# Patient Record
Sex: MALE | Race: BLACK OR AFRICAN AMERICAN | NOT HISPANIC OR LATINO | URBAN - METROPOLITAN AREA
[De-identification: names, ages, dates, MRNs, and addresses within clinical notes are randomized per-mention and may not be internally consistent; named-entity substitution may affect disease eponyms.]

---

## 2024-01-01 ENCOUNTER — HOSPITAL ENCOUNTER (INPATIENT)
Facility: MEDICAL CENTER | Age: 39
LOS: 8 days | DRG: 208 | End: 2024-06-18
Attending: EMERGENCY MEDICINE | Admitting: INTERNAL MEDICINE
Payer: MEDICAID

## 2024-01-01 ENCOUNTER — APPOINTMENT (OUTPATIENT)
Dept: RADIOLOGY | Facility: MEDICAL CENTER | Age: 39
DRG: 208 | End: 2024-01-01
Attending: EMERGENCY MEDICINE
Payer: MEDICAID

## 2024-01-01 ENCOUNTER — APPOINTMENT (OUTPATIENT)
Dept: RADIOLOGY | Facility: MEDICAL CENTER | Age: 39
DRG: 208 | End: 2024-01-01
Payer: MEDICAID

## 2024-01-01 ENCOUNTER — APPOINTMENT (OUTPATIENT)
Dept: RADIOLOGY | Facility: MEDICAL CENTER | Age: 39
DRG: 208 | End: 2024-01-01
Attending: INTERNAL MEDICINE
Payer: MEDICAID

## 2024-01-01 ENCOUNTER — HOSPITAL ENCOUNTER (OUTPATIENT)
Dept: RADIOLOGY | Facility: MEDICAL CENTER | Age: 39
End: 2024-06-11
Attending: INTERNAL MEDICINE
Payer: MEDICAID

## 2024-01-01 ENCOUNTER — HOSPITAL ENCOUNTER (INPATIENT)
Facility: MEDICAL CENTER | Age: 39
LOS: 4 days | DRG: 951 | End: 2024-06-18
Admitting: INTERNAL MEDICINE
Payer: COMMERCIAL

## 2024-01-01 ENCOUNTER — APPOINTMENT (OUTPATIENT)
Dept: CARDIOLOGY | Facility: MEDICAL CENTER | Age: 39
DRG: 208 | End: 2024-01-01
Payer: MEDICAID

## 2024-01-01 ENCOUNTER — APPOINTMENT (OUTPATIENT)
Dept: CARDIOLOGY | Facility: MEDICAL CENTER | Age: 39
DRG: 208 | End: 2024-01-01
Attending: INTERNAL MEDICINE
Payer: MEDICAID

## 2024-01-01 ENCOUNTER — HOSPITAL ENCOUNTER (OUTPATIENT)
Dept: RADIOLOGY | Facility: MEDICAL CENTER | Age: 39
End: 2024-06-12
Attending: INTERNAL MEDICINE
Payer: MEDICAID

## 2024-01-01 VITALS
HEIGHT: 68 IN | OXYGEN SATURATION: 96 % | RESPIRATION RATE: 23 BRPM | DIASTOLIC BLOOD PRESSURE: 95 MMHG | TEMPERATURE: 99 F | SYSTOLIC BLOOD PRESSURE: 158 MMHG | HEART RATE: 105 BPM | WEIGHT: 180.78 LBS | BODY MASS INDEX: 27.4 KG/M2

## 2024-01-01 DIAGNOSIS — E87.20 LACTIC ACIDOSIS: ICD-10-CM

## 2024-01-01 DIAGNOSIS — R57.9 SHOCK (HCC): ICD-10-CM

## 2024-01-01 DIAGNOSIS — I46.9 CARDIOPULMONARY ARREST (HCC): ICD-10-CM

## 2024-01-01 DIAGNOSIS — I46.9 CARDIAC ARREST (HCC): ICD-10-CM

## 2024-01-01 LAB
ABO + RH BLD: NORMAL
ABO GROUP BLD: NORMAL
ABO GROUP BLD: NORMAL
ACANTHOCYTES BLD QL SMEAR: NORMAL
ALBUMIN SERPL BCP-MCNC: 2.1 G/DL (ref 3.2–4.9)
ALBUMIN SERPL BCP-MCNC: 2.3 G/DL (ref 3.2–4.9)
ALBUMIN SERPL BCP-MCNC: 2.5 G/DL (ref 3.2–4.9)
ALBUMIN SERPL BCP-MCNC: 2.6 G/DL (ref 3.2–4.9)
ALBUMIN SERPL BCP-MCNC: 2.7 G/DL (ref 3.2–4.9)
ALBUMIN SERPL BCP-MCNC: 2.9 G/DL (ref 3.2–4.9)
ALBUMIN SERPL BCP-MCNC: 3.1 G/DL (ref 3.2–4.9)
ALBUMIN SERPL BCP-MCNC: 3.1 G/DL (ref 3.2–4.9)
ALBUMIN SERPL BCP-MCNC: 3.2 G/DL (ref 3.2–4.9)
ALBUMIN/GLOB SERPL: 0.7 G/DL
ALBUMIN/GLOB SERPL: 0.8 G/DL
ALBUMIN/GLOB SERPL: 0.9 G/DL
ALBUMIN/GLOB SERPL: 1 G/DL
ALBUMIN/GLOB SERPL: 1.2 G/DL
ALBUMIN/GLOB SERPL: 1.5 G/DL
ALP SERPL-CCNC: 114 U/L (ref 30–99)
ALP SERPL-CCNC: 117 U/L (ref 30–99)
ALP SERPL-CCNC: 119 U/L (ref 30–99)
ALP SERPL-CCNC: 120 U/L (ref 30–99)
ALP SERPL-CCNC: 120 U/L (ref 30–99)
ALP SERPL-CCNC: 197 U/L (ref 30–99)
ALP SERPL-CCNC: 206 U/L (ref 30–99)
ALP SERPL-CCNC: 214 U/L (ref 30–99)
ALP SERPL-CCNC: 221 U/L (ref 30–99)
ALP SERPL-CCNC: 241 U/L (ref 30–99)
ALP SERPL-CCNC: 270 U/L (ref 30–99)
ALP SERPL-CCNC: 287 U/L (ref 30–99)
ALP SERPL-CCNC: 296 U/L (ref 30–99)
ALP SERPL-CCNC: 299 U/L (ref 30–99)
ALP SERPL-CCNC: 319 U/L (ref 30–99)
ALP SERPL-CCNC: 322 U/L (ref 30–99)
ALP SERPL-CCNC: 325 U/L (ref 30–99)
ALP SERPL-CCNC: 352 U/L (ref 30–99)
ALP SERPL-CCNC: 364 U/L (ref 30–99)
ALP SERPL-CCNC: 378 U/L (ref 30–99)
ALP SERPL-CCNC: 75 U/L (ref 30–99)
ALT SERPL-CCNC: 1007 U/L (ref 2–50)
ALT SERPL-CCNC: 1020 U/L (ref 2–50)
ALT SERPL-CCNC: 1050 U/L (ref 2–50)
ALT SERPL-CCNC: 1427 U/L (ref 2–50)
ALT SERPL-CCNC: 2282 U/L (ref 2–50)
ALT SERPL-CCNC: 307 U/L (ref 2–50)
ALT SERPL-CCNC: 332 U/L (ref 2–50)
ALT SERPL-CCNC: 339 U/L (ref 2–50)
ALT SERPL-CCNC: 3400 U/L (ref 2–50)
ALT SERPL-CCNC: 3745 U/L (ref 2–50)
ALT SERPL-CCNC: 387 U/L (ref 2–50)
ALT SERPL-CCNC: 424 U/L (ref 2–50)
ALT SERPL-CCNC: 4343 U/L (ref 2–50)
ALT SERPL-CCNC: 447 U/L (ref 2–50)
ALT SERPL-CCNC: 485 U/L (ref 2–50)
ALT SERPL-CCNC: 576 U/L (ref 2–50)
ALT SERPL-CCNC: 621 U/L (ref 2–50)
ALT SERPL-CCNC: 6503 U/L (ref 2–50)
ALT SERPL-CCNC: 694 U/L (ref 2–50)
ALT SERPL-CCNC: 719 U/L (ref 2–50)
ALT SERPL-CCNC: 816 U/L (ref 2–50)
AMMONIA PLAS-SCNC: 39 UMOL/L (ref 11–45)
AMORPH CRY #/AREA URNS HPF: PRESENT /HPF
AMPHET UR QL SCN: POSITIVE
AMYLASE SERPL-CCNC: 215 U/L (ref 20–103)
ANION GAP SERPL CALC-SCNC: 16 MMOL/L (ref 7–16)
ANION GAP SERPL CALC-SCNC: 17 MMOL/L (ref 7–16)
ANION GAP SERPL CALC-SCNC: 18 MMOL/L (ref 7–16)
ANION GAP SERPL CALC-SCNC: 19 MMOL/L (ref 7–16)
ANION GAP SERPL CALC-SCNC: 20 MMOL/L (ref 7–16)
ANION GAP SERPL CALC-SCNC: 21 MMOL/L (ref 7–16)
ANION GAP SERPL CALC-SCNC: 22 MMOL/L (ref 7–16)
ANION GAP SERPL CALC-SCNC: 23 MMOL/L (ref 7–16)
ANION GAP SERPL CALC-SCNC: 28 MMOL/L (ref 7–16)
ANION GAP SERPL CALC-SCNC: 31 MMOL/L (ref 7–16)
ANISOCYTOSIS BLD QL SMEAR: ABNORMAL
ANISOCYTOSIS BLD QL SMEAR: ABNORMAL
APAP SERPL-MCNC: 18 UG/ML (ref 10–30)
APAP SERPL-MCNC: <5 UG/ML (ref 10–30)
APPEARANCE UR: ABNORMAL
APPEARANCE UR: CLEAR
APPEARANCE UR: CLEAR
APTT PPP: 29.6 SEC (ref 24.7–36)
APTT PPP: 31 SEC (ref 24.7–36)
APTT PPP: 31.1 SEC (ref 24.7–36)
APTT PPP: 32.7 SEC (ref 24.7–36)
APTT PPP: 33 SEC (ref 24.7–36)
APTT PPP: 33.7 SEC (ref 24.7–36)
APTT PPP: 35 SEC (ref 24.7–36)
APTT PPP: 35.1 SEC (ref 24.7–36)
APTT PPP: 35.1 SEC (ref 24.7–36)
APTT PPP: 35.3 SEC (ref 24.7–36)
APTT PPP: 35.3 SEC (ref 24.7–36)
APTT PPP: 35.6 SEC (ref 24.7–36)
APTT PPP: 36.3 SEC (ref 24.7–36)
APTT PPP: 36.8 SEC (ref 24.7–36)
APTT PPP: 39.5 SEC (ref 24.7–36)
APTT PPP: 40 SEC (ref 24.7–36)
ARTERIAL PATENCY WRIST A: ABNORMAL
AST SERPL-CCNC: 109 U/L (ref 12–45)
AST SERPL-CCNC: 126 U/L (ref 12–45)
AST SERPL-CCNC: 131 U/L (ref 12–45)
AST SERPL-CCNC: 132 U/L (ref 12–45)
AST SERPL-CCNC: 138 U/L (ref 12–45)
AST SERPL-CCNC: 146 U/L (ref 12–45)
AST SERPL-CCNC: 159 U/L (ref 12–45)
AST SERPL-CCNC: 170 U/L (ref 12–45)
AST SERPL-CCNC: 191 U/L (ref 12–45)
AST SERPL-CCNC: 202 U/L (ref 12–45)
AST SERPL-CCNC: 220 U/L (ref 12–45)
AST SERPL-CCNC: 2520 U/L (ref 12–45)
AST SERPL-CCNC: 258 U/L (ref 12–45)
AST SERPL-CCNC: 286 U/L (ref 12–45)
AST SERPL-CCNC: 301 U/L (ref 12–45)
AST SERPL-CCNC: 3627 U/L (ref 12–45)
AST SERPL-CCNC: 3716 U/L (ref 12–45)
AST SERPL-CCNC: 445 U/L (ref 12–45)
AST SERPL-CCNC: 900 U/L (ref 12–45)
AST SERPL-CCNC: 99 U/L (ref 12–45)
AST SERPL-CCNC: >7000 U/L (ref 12–45)
BACTERIA #/AREA URNS HPF: ABNORMAL /HPF
BACTERIA #/AREA URNS HPF: NEGATIVE /HPF
BACTERIA #/AREA URNS HPF: NEGATIVE /HPF
BACTERIA BLD CULT: NORMAL
BACTERIA BLD CULT: NORMAL
BACTERIA SPEC RESP CULT: NORMAL
BACTERIA SPEC RESP CULT: NORMAL
BACTERIA UR CULT: NORMAL
BARBITURATES UR QL SCN: NEGATIVE
BARCODED ABORH UBTYP: 6200
BARCODED PRD CODE UBPRD: NORMAL
BARCODED UNIT NUM UBUNT: NORMAL
BASE EXCESS BLDA CALC-SCNC: -1 MMOL/L (ref -4–3)
BASE EXCESS BLDA CALC-SCNC: -11 MMOL/L (ref -4–3)
BASE EXCESS BLDA CALC-SCNC: -14 MMOL/L (ref -4–3)
BASE EXCESS BLDA CALC-SCNC: -15 MMOL/L (ref -4–3)
BASE EXCESS BLDA CALC-SCNC: -16 MMOL/L (ref -4–3)
BASE EXCESS BLDA CALC-SCNC: -7 MMOL/L (ref -4–3)
BASE EXCESS BLDA CALC-SCNC: 10 MMOL/L (ref -4–3)
BASE EXCESS BLDA CALC-SCNC: 11 MMOL/L (ref -4–3)
BASE EXCESS BLDA CALC-SCNC: 2 MMOL/L (ref -4–3)
BASE EXCESS BLDA CALC-SCNC: 5 MMOL/L (ref -4–3)
BASE EXCESS BLDA CALC-SCNC: 5 MMOL/L (ref -4–3)
BASE EXCESS BLDA CALC-SCNC: 7 MMOL/L (ref -4–3)
BASE EXCESS BLDA CALC-SCNC: 7 MMOL/L (ref -4–3)
BASE EXCESS BLDA CALC-SCNC: 8 MMOL/L (ref -4–3)
BASE EXCESS BLDA CALC-SCNC: 9 MMOL/L (ref -4–3)
BASOPHILS # BLD AUTO: 0 % (ref 0–1.8)
BASOPHILS # BLD AUTO: 0 % (ref 0–1.8)
BASOPHILS # BLD AUTO: 0.1 % (ref 0–1.8)
BASOPHILS # BLD AUTO: 0.2 % (ref 0–1.8)
BASOPHILS # BLD: 0 K/UL (ref 0–0.12)
BASOPHILS # BLD: 0 K/UL (ref 0–0.12)
BASOPHILS # BLD: 0.01 K/UL (ref 0–0.12)
BASOPHILS # BLD: 0.02 K/UL (ref 0–0.12)
BASOPHILS # BLD: 0.03 K/UL (ref 0–0.12)
BASOPHILS # BLD: 0.04 K/UL (ref 0–0.12)
BASOPHILS # BLD: 0.05 K/UL (ref 0–0.12)
BENZODIAZ UR QL SCN: NEGATIVE
BILIRUB CONJ SERPL-MCNC: 0.2 MG/DL (ref 0.1–0.5)
BILIRUB CONJ SERPL-MCNC: 0.2 MG/DL (ref 0.1–0.5)
BILIRUB CONJ SERPL-MCNC: 0.3 MG/DL (ref 0.1–0.5)
BILIRUB CONJ SERPL-MCNC: 0.4 MG/DL (ref 0.1–0.5)
BILIRUB CONJ SERPL-MCNC: 0.4 MG/DL (ref 0.1–0.5)
BILIRUB CONJ SERPL-MCNC: 0.5 MG/DL (ref 0.1–0.5)
BILIRUB CONJ SERPL-MCNC: 0.7 MG/DL (ref 0.1–0.5)
BILIRUB CONJ SERPL-MCNC: 0.8 MG/DL (ref 0.1–0.5)
BILIRUB CONJ SERPL-MCNC: 0.9 MG/DL (ref 0.1–0.5)
BILIRUB CONJ SERPL-MCNC: 0.9 MG/DL (ref 0.1–0.5)
BILIRUB INDIRECT SERPL-MCNC: 0.1 MG/DL (ref 0–1)
BILIRUB INDIRECT SERPL-MCNC: 0.1 MG/DL (ref 0–1)
BILIRUB INDIRECT SERPL-MCNC: 0.2 MG/DL (ref 0–1)
BILIRUB INDIRECT SERPL-MCNC: 0.3 MG/DL (ref 0–1)
BILIRUB INDIRECT SERPL-MCNC: 0.4 MG/DL (ref 0–1)
BILIRUB SERPL-MCNC: 0.3 MG/DL (ref 0.1–1.5)
BILIRUB SERPL-MCNC: 0.4 MG/DL (ref 0.1–1.5)
BILIRUB SERPL-MCNC: 0.4 MG/DL (ref 0.1–1.5)
BILIRUB SERPL-MCNC: 0.5 MG/DL (ref 0.1–1.5)
BILIRUB SERPL-MCNC: 0.6 MG/DL (ref 0.1–1.5)
BILIRUB SERPL-MCNC: 0.7 MG/DL (ref 0.1–1.5)
BILIRUB SERPL-MCNC: 0.7 MG/DL (ref 0.1–1.5)
BILIRUB SERPL-MCNC: 0.9 MG/DL (ref 0.1–1.5)
BILIRUB SERPL-MCNC: 1 MG/DL (ref 0.1–1.5)
BILIRUB SERPL-MCNC: 1.2 MG/DL (ref 0.1–1.5)
BILIRUB SERPL-MCNC: 1.3 MG/DL (ref 0.1–1.5)
BILIRUB SERPL-MCNC: 1.4 MG/DL (ref 0.1–1.5)
BILIRUB SERPL-MCNC: <0.2 MG/DL (ref 0.1–1.5)
BILIRUB UR QL STRIP.AUTO: NEGATIVE
BLD GP AB SCN SERPL QL: NORMAL
BLD GP AB SCN SERPL QL: NORMAL
BODY TEMPERATURE: 36.8 CENTIGRADE
BODY TEMPERATURE: 36.9 CENTIGRADE
BODY TEMPERATURE: 37 CENTIGRADE
BODY TEMPERATURE: ABNORMAL DEGREES
BREATHS SETTING VENT: 26
BREATHS SETTING VENT: 30
BREATHS SETTING VENT: 32
BUN SERPL-MCNC: 100 MG/DL (ref 8–22)
BUN SERPL-MCNC: 103 MG/DL (ref 8–22)
BUN SERPL-MCNC: 108 MG/DL (ref 8–22)
BUN SERPL-MCNC: 111 MG/DL (ref 8–22)
BUN SERPL-MCNC: 111 MG/DL (ref 8–22)
BUN SERPL-MCNC: 115 MG/DL (ref 8–22)
BUN SERPL-MCNC: 116 MG/DL (ref 8–22)
BUN SERPL-MCNC: 118 MG/DL (ref 8–22)
BUN SERPL-MCNC: 123 MG/DL (ref 8–22)
BUN SERPL-MCNC: 126 MG/DL (ref 8–22)
BUN SERPL-MCNC: 128 MG/DL (ref 8–22)
BUN SERPL-MCNC: 136 MG/DL (ref 8–22)
BUN SERPL-MCNC: 140 MG/DL (ref 8–22)
BUN SERPL-MCNC: 15 MG/DL (ref 8–22)
BUN SERPL-MCNC: 151 MG/DL (ref 8–22)
BUN SERPL-MCNC: 155 MG/DL (ref 8–22)
BUN SERPL-MCNC: 165 MG/DL (ref 8–22)
BUN SERPL-MCNC: 19 MG/DL (ref 8–22)
BUN SERPL-MCNC: 26 MG/DL (ref 8–22)
BUN SERPL-MCNC: 27 MG/DL (ref 8–22)
BUN SERPL-MCNC: 37 MG/DL (ref 8–22)
BUN SERPL-MCNC: 47 MG/DL (ref 8–22)
BUN SERPL-MCNC: 52 MG/DL (ref 8–22)
BUN SERPL-MCNC: 58 MG/DL (ref 8–22)
BUN SERPL-MCNC: 66 MG/DL (ref 8–22)
BUN SERPL-MCNC: 75 MG/DL (ref 8–22)
BUN SERPL-MCNC: 75 MG/DL (ref 8–22)
BUN SERPL-MCNC: 77 MG/DL (ref 8–22)
BUN SERPL-MCNC: 79 MG/DL (ref 8–22)
BUN SERPL-MCNC: 89 MG/DL (ref 8–22)
BUN SERPL-MCNC: 93 MG/DL (ref 8–22)
BUN SERPL-MCNC: 98 MG/DL (ref 8–22)
BUN SERPL-MCNC: 98 MG/DL (ref 8–22)
BUN SERPL-MCNC: >112 MG/DL (ref 8–22)
BURR CELLS BLD QL SMEAR: NORMAL
BZE UR QL SCN: NEGATIVE
CA-I BLD ISE-SCNC: 0.99 MMOL/L (ref 1.1–1.3)
CA-I BLD ISE-SCNC: 1.02 MMOL/L (ref 1.1–1.3)
CA-I BLD ISE-SCNC: 1.08 MMOL/L (ref 1.1–1.3)
CA-I SERPL-SCNC: 1 MMOL/L (ref 1.1–1.3)
CALCIUM ALBUM COR SERPL-MCNC: 10.1 MG/DL (ref 8.5–10.5)
CALCIUM ALBUM COR SERPL-MCNC: 10.2 MG/DL (ref 8.5–10.5)
CALCIUM ALBUM COR SERPL-MCNC: 10.2 MG/DL (ref 8.5–10.5)
CALCIUM ALBUM COR SERPL-MCNC: 8.1 MG/DL (ref 8.5–10.5)
CALCIUM ALBUM COR SERPL-MCNC: 8.2 MG/DL (ref 8.5–10.5)
CALCIUM ALBUM COR SERPL-MCNC: 8.5 MG/DL (ref 8.5–10.5)
CALCIUM ALBUM COR SERPL-MCNC: 8.5 MG/DL (ref 8.5–10.5)
CALCIUM ALBUM COR SERPL-MCNC: 8.6 MG/DL (ref 8.5–10.5)
CALCIUM ALBUM COR SERPL-MCNC: 8.8 MG/DL (ref 8.5–10.5)
CALCIUM ALBUM COR SERPL-MCNC: 8.8 MG/DL (ref 8.5–10.5)
CALCIUM ALBUM COR SERPL-MCNC: 9 MG/DL (ref 8.5–10.5)
CALCIUM ALBUM COR SERPL-MCNC: 9 MG/DL (ref 8.5–10.5)
CALCIUM ALBUM COR SERPL-MCNC: 9.1 MG/DL (ref 8.5–10.5)
CALCIUM ALBUM COR SERPL-MCNC: 9.1 MG/DL (ref 8.5–10.5)
CALCIUM ALBUM COR SERPL-MCNC: 9.2 MG/DL (ref 8.5–10.5)
CALCIUM ALBUM COR SERPL-MCNC: 9.3 MG/DL (ref 8.5–10.5)
CALCIUM ALBUM COR SERPL-MCNC: 9.5 MG/DL (ref 8.5–10.5)
CALCIUM ALBUM COR SERPL-MCNC: 9.7 MG/DL (ref 8.5–10.5)
CALCIUM ALBUM COR SERPL-MCNC: 9.8 MG/DL (ref 8.5–10.5)
CALCIUM ALBUM COR SERPL-MCNC: 9.8 MG/DL (ref 8.5–10.5)
CALCIUM SERPL-MCNC: 6.7 MG/DL (ref 8.5–10.5)
CALCIUM SERPL-MCNC: 6.8 MG/DL (ref 8.5–10.5)
CALCIUM SERPL-MCNC: 7 MG/DL (ref 8.5–10.5)
CALCIUM SERPL-MCNC: 7.1 MG/DL (ref 8.5–10.5)
CALCIUM SERPL-MCNC: 7.3 MG/DL (ref 8.5–10.5)
CALCIUM SERPL-MCNC: 7.3 MG/DL (ref 8.5–10.5)
CALCIUM SERPL-MCNC: 7.4 MG/DL (ref 8.5–10.5)
CALCIUM SERPL-MCNC: 7.5 MG/DL (ref 8.5–10.5)
CALCIUM SERPL-MCNC: 7.5 MG/DL (ref 8.5–10.5)
CALCIUM SERPL-MCNC: 7.6 MG/DL (ref 8.5–10.5)
CALCIUM SERPL-MCNC: 7.7 MG/DL (ref 8.5–10.5)
CALCIUM SERPL-MCNC: 7.7 MG/DL (ref 8.5–10.5)
CALCIUM SERPL-MCNC: 7.8 MG/DL (ref 8.5–10.5)
CALCIUM SERPL-MCNC: 7.9 MG/DL (ref 8.5–10.5)
CALCIUM SERPL-MCNC: 8 MG/DL (ref 8.5–10.5)
CALCIUM SERPL-MCNC: 8.1 MG/DL (ref 8.5–10.5)
CALCIUM SERPL-MCNC: 8.2 MG/DL (ref 8.5–10.5)
CALCIUM SERPL-MCNC: 8.4 MG/DL (ref 8.5–10.5)
CALCIUM SERPL-MCNC: 8.6 MG/DL (ref 8.5–10.5)
CALCIUM SERPL-MCNC: 8.7 MG/DL (ref 8.5–10.5)
CALCIUM SERPL-MCNC: 8.8 MG/DL (ref 8.5–10.5)
CALCIUM SERPL-MCNC: 8.9 MG/DL (ref 8.5–10.5)
CALCIUM SERPL-MCNC: 8.9 MG/DL (ref 8.5–10.5)
CALCIUM SERPL-MCNC: 9 MG/DL (ref 8.5–10.5)
CALCIUM SERPL-MCNC: 9 MG/DL (ref 8.5–10.5)
CANNABINOIDS UR QL SCN: POSITIVE
CFT BLD TEG: 9.1 MIN (ref 4.6–9.1)
CFT P HPASE BLD TEG: 7.9 MIN (ref 4.3–8.3)
CHLORIDE SERPL-SCNC: 100 MMOL/L (ref 96–112)
CHLORIDE SERPL-SCNC: 101 MMOL/L (ref 96–112)
CHLORIDE SERPL-SCNC: 101 MMOL/L (ref 96–112)
CHLORIDE SERPL-SCNC: 102 MMOL/L (ref 96–112)
CHLORIDE SERPL-SCNC: 102 MMOL/L (ref 96–112)
CHLORIDE SERPL-SCNC: 106 MMOL/L (ref 96–112)
CHLORIDE SERPL-SCNC: 85 MMOL/L (ref 96–112)
CHLORIDE SERPL-SCNC: 86 MMOL/L (ref 96–112)
CHLORIDE SERPL-SCNC: 87 MMOL/L (ref 96–112)
CHLORIDE SERPL-SCNC: 88 MMOL/L (ref 96–112)
CHLORIDE SERPL-SCNC: 90 MMOL/L (ref 96–112)
CHLORIDE SERPL-SCNC: 91 MMOL/L (ref 96–112)
CHLORIDE SERPL-SCNC: 92 MMOL/L (ref 96–112)
CHLORIDE SERPL-SCNC: 92 MMOL/L (ref 96–112)
CHLORIDE SERPL-SCNC: 93 MMOL/L (ref 96–112)
CHLORIDE SERPL-SCNC: 95 MMOL/L (ref 96–112)
CHLORIDE SERPL-SCNC: 96 MMOL/L (ref 96–112)
CHLORIDE SERPL-SCNC: 99 MMOL/L (ref 96–112)
CHLORIDE SERPL-SCNC: 99 MMOL/L (ref 96–112)
CK BB CFR SERPL ELPH: 0 % (ref 0–0)
CK MACRO1 CFR SERPL: 0 % (ref 0–0)
CK MACRO2 CFR SERPL: 0 % (ref 0–0)
CK MB CFR SERPL ELPH: 0 % (ref 0–4)
CK MM CFR SERPL ELPH: 100 % (ref 96–100)
CK SERPL-CCNC: 1248 U/L (ref 0–154)
CK SERPL-CCNC: 1281 U/L (ref 39–308)
CK SERPL-CCNC: 137 U/L (ref 39–308)
CK SERPL-CCNC: 1394 U/L (ref 39–308)
CK SERPL-CCNC: 1622 U/L (ref 39–308)
CK SERPL-CCNC: 1885 U/L (ref 39–308)
CK SERPL-CCNC: 2352 U/L (ref 0–154)
CK SERPL-CCNC: 2373 U/L (ref 39–308)
CK SERPL-CCNC: 4260 U/L (ref 0–154)
CK SERPL-CCNC: ABNORMAL U/L (ref 0–154)
CLOT ANGLE BLD TEG: 79.4 DEGREES (ref 63–78)
CLOT LYSIS 30M P MA LENFR BLD TEG: 0.1 % (ref 0–2.6)
CO2 BLDA-SCNC: 12 MMOL/L (ref 20–33)
CO2 BLDA-SCNC: 14 MMOL/L (ref 20–33)
CO2 BLDA-SCNC: 17 MMOL/L (ref 20–33)
CO2 BLDA-SCNC: 18 MMOL/L (ref 20–33)
CO2 BLDA-SCNC: 25 MMOL/L (ref 20–33)
CO2 BLDA-SCNC: 27 MMOL/L (ref 20–33)
CO2 BLDA-SCNC: 32 MMOL/L (ref 20–33)
CO2 BLDA-SCNC: 33 MMOL/L (ref 20–33)
CO2 BLDA-SCNC: 34 MMOL/L (ref 20–33)
CO2 BLDA-SCNC: 35 MMOL/L (ref 20–33)
CO2 BLDA-SCNC: 36 MMOL/L (ref 20–33)
CO2 BLDA-SCNC: 37 MMOL/L (ref 20–33)
CO2 SERPL-SCNC: 12 MMOL/L (ref 20–33)
CO2 SERPL-SCNC: 14 MMOL/L (ref 20–33)
CO2 SERPL-SCNC: 14 MMOL/L (ref 20–33)
CO2 SERPL-SCNC: 15 MMOL/L (ref 20–33)
CO2 SERPL-SCNC: 15 MMOL/L (ref 20–33)
CO2 SERPL-SCNC: 16 MMOL/L (ref 20–33)
CO2 SERPL-SCNC: 21 MMOL/L (ref 20–33)
CO2 SERPL-SCNC: 22 MMOL/L (ref 20–33)
CO2 SERPL-SCNC: 23 MMOL/L (ref 20–33)
CO2 SERPL-SCNC: 24 MMOL/L (ref 20–33)
CO2 SERPL-SCNC: 26 MMOL/L (ref 20–33)
CO2 SERPL-SCNC: 27 MMOL/L (ref 20–33)
CO2 SERPL-SCNC: 28 MMOL/L (ref 20–33)
CO2 SERPL-SCNC: 29 MMOL/L (ref 20–33)
CO2 SERPL-SCNC: 29 MMOL/L (ref 20–33)
CO2 SERPL-SCNC: 9 MMOL/L (ref 20–33)
COLOR UR: YELLOW
COMMENT 1642: NORMAL
COMMENT NL1176: NORMAL
COMPONENT R 8504R: NORMAL
CREAT SERPL-MCNC: 10.14 MG/DL (ref 0.5–1.4)
CREAT SERPL-MCNC: 10.49 MG/DL (ref 0.5–1.4)
CREAT SERPL-MCNC: 10.53 MG/DL (ref 0.5–1.4)
CREAT SERPL-MCNC: 11.11 MG/DL (ref 0.5–1.4)
CREAT SERPL-MCNC: 11.41 MG/DL (ref 0.5–1.4)
CREAT SERPL-MCNC: 12.01 MG/DL (ref 0.5–1.4)
CREAT SERPL-MCNC: 12.01 MG/DL (ref 0.5–1.4)
CREAT SERPL-MCNC: 12.87 MG/DL (ref 0.5–1.4)
CREAT SERPL-MCNC: 12.97 MG/DL (ref 0.5–1.4)
CREAT SERPL-MCNC: 13.37 MG/DL (ref 0.5–1.4)
CREAT SERPL-MCNC: 13.74 MG/DL (ref 0.5–1.4)
CREAT SERPL-MCNC: 2.29 MG/DL (ref 0.5–1.4)
CREAT SERPL-MCNC: 2.31 MG/DL (ref 0.5–1.4)
CREAT SERPL-MCNC: 2.35 MG/DL (ref 0.5–1.4)
CREAT SERPL-MCNC: 2.39 MG/DL (ref 0.5–1.4)
CREAT SERPL-MCNC: 3.06 MG/DL (ref 0.5–1.4)
CREAT SERPL-MCNC: 3.4 MG/DL (ref 0.5–1.4)
CREAT SERPL-MCNC: 3.97 MG/DL (ref 0.5–1.4)
CREAT SERPL-MCNC: 4.07 MG/DL (ref 0.5–1.4)
CREAT SERPL-MCNC: 4.36 MG/DL (ref 0.5–1.4)
CREAT SERPL-MCNC: 4.4 MG/DL (ref 0.5–1.4)
CREAT SERPL-MCNC: 5.09 MG/DL (ref 0.5–1.4)
CREAT SERPL-MCNC: 5.15 MG/DL (ref 0.5–1.4)
CREAT SERPL-MCNC: 5.57 MG/DL (ref 0.5–1.4)
CREAT SERPL-MCNC: 5.95 MG/DL (ref 0.5–1.4)
CREAT SERPL-MCNC: 6.27 MG/DL (ref 0.5–1.4)
CREAT SERPL-MCNC: 6.87 MG/DL (ref 0.5–1.4)
CREAT SERPL-MCNC: 7.33 MG/DL (ref 0.5–1.4)
CREAT SERPL-MCNC: 7.65 MG/DL (ref 0.5–1.4)
CREAT SERPL-MCNC: 8.2 MG/DL (ref 0.5–1.4)
CREAT SERPL-MCNC: 8.28 MG/DL (ref 0.5–1.4)
CREAT SERPL-MCNC: 8.6 MG/DL (ref 0.5–1.4)
CREAT SERPL-MCNC: 8.64 MG/DL (ref 0.5–1.4)
CREAT SERPL-MCNC: 9.25 MG/DL (ref 0.5–1.4)
CREAT SERPL-MCNC: 9.63 MG/DL (ref 0.5–1.4)
CREAT SERPL-MCNC: 9.84 MG/DL (ref 0.5–1.4)
CRP SERPL HS-MCNC: 0.69 MG/DL (ref 0–0.75)
CRP SERPL HS-MCNC: 11.25 MG/DL (ref 0–0.75)
CRP SERPL HS-MCNC: 3.18 MG/DL (ref 0–0.75)
CT.EXTRINSIC BLD ROTEM: 0.8 MIN (ref 0.8–2.1)
CYTOLOGY REG CYTOL: NORMAL
DELSYS IDSYS: ABNORMAL
EKG IMPRESSION: NORMAL
END TIDAL CARBON DIOXIDE IECO2: 20 MMHG
END TIDAL CARBON DIOXIDE IECO2: 21 MMHG
END TIDAL CARBON DIOXIDE IECO2: 24 MMHG
END TIDAL CARBON DIOXIDE IECO2: 25 MMHG
END TIDAL CARBON DIOXIDE IECO2: 26 MMHG
END TIDAL CARBON DIOXIDE IECO2: 32 MMHG
END TIDAL CARBON DIOXIDE IECO2: 33 MMHG
END TIDAL CARBON DIOXIDE IECO2: 34 MMHG
END TIDAL CARBON DIOXIDE IECO2: 35 MMHG
END TIDAL CARBON DIOXIDE IECO2: 36 MMHG
END TIDAL CARBON DIOXIDE IECO2: 36 MMHG
END TIDAL CARBON DIOXIDE IECO2: 38 MMHG
END TIDAL CARBON DIOXIDE IECO2: 39 MMHG
END TIDAL CARBON DIOXIDE IECO2: 40 MMHG
END TIDAL CARBON DIOXIDE IECO2: 42 MMHG
EOSINOPHIL # BLD AUTO: 0 K/UL (ref 0–0.51)
EOSINOPHIL # BLD AUTO: 0.05 K/UL (ref 0–0.51)
EOSINOPHIL # BLD AUTO: 0.06 K/UL (ref 0–0.51)
EOSINOPHIL # BLD AUTO: 0.08 K/UL (ref 0–0.51)
EOSINOPHIL # BLD AUTO: 0.09 K/UL (ref 0–0.51)
EOSINOPHIL # BLD AUTO: 0.12 K/UL (ref 0–0.51)
EOSINOPHIL # BLD AUTO: 0.13 K/UL (ref 0–0.51)
EOSINOPHIL # BLD AUTO: 0.22 K/UL (ref 0–0.51)
EOSINOPHIL # BLD AUTO: 0.26 K/UL (ref 0–0.51)
EOSINOPHIL # BLD AUTO: 0.27 K/UL (ref 0–0.51)
EOSINOPHIL # BLD AUTO: 0.28 K/UL (ref 0–0.51)
EOSINOPHIL # BLD AUTO: 0.31 K/UL (ref 0–0.51)
EOSINOPHIL # BLD AUTO: 0.33 K/UL (ref 0–0.51)
EOSINOPHIL # BLD AUTO: 0.34 K/UL (ref 0–0.51)
EOSINOPHIL # BLD AUTO: 0.35 K/UL (ref 0–0.51)
EOSINOPHIL # BLD AUTO: 0.37 K/UL (ref 0–0.51)
EOSINOPHIL # BLD AUTO: 0.38 K/UL (ref 0–0.51)
EOSINOPHIL # BLD AUTO: 0.43 K/UL (ref 0–0.51)
EOSINOPHIL NFR BLD: 0 % (ref 0–6.9)
EOSINOPHIL NFR BLD: 0.3 % (ref 0–6.9)
EOSINOPHIL NFR BLD: 0.4 % (ref 0–6.9)
EOSINOPHIL NFR BLD: 0.5 % (ref 0–6.9)
EOSINOPHIL NFR BLD: 0.8 % (ref 0–6.9)
EOSINOPHIL NFR BLD: 0.8 % (ref 0–6.9)
EOSINOPHIL NFR BLD: 1 % (ref 0–6.9)
EOSINOPHIL NFR BLD: 1.7 % (ref 0–6.9)
EOSINOPHIL NFR BLD: 1.7 % (ref 0–6.9)
EOSINOPHIL NFR BLD: 1.8 % (ref 0–6.9)
EOSINOPHIL NFR BLD: 2 % (ref 0–6.9)
EOSINOPHIL NFR BLD: 2 % (ref 0–6.9)
EOSINOPHIL NFR BLD: 2.2 % (ref 0–6.9)
EOSINOPHIL NFR BLD: 2.2 % (ref 0–6.9)
EOSINOPHIL NFR BLD: 2.5 % (ref 0–6.9)
EOSINOPHIL NFR BLD: 2.5 % (ref 0–6.9)
EOSINOPHIL NFR BLD: 2.6 % (ref 0–6.9)
EOSINOPHIL NFR BLD: 2.7 % (ref 0–6.9)
EPI CELLS #/AREA URNS HPF: ABNORMAL /HPF
EPI CELLS #/AREA URNS HPF: NEGATIVE /HPF
EPI CELLS #/AREA URNS HPF: NEGATIVE /HPF
ERYTHROCYTE [DISTWIDTH] IN BLOOD BY AUTOMATED COUNT: 39.7 FL (ref 35.9–50)
ERYTHROCYTE [DISTWIDTH] IN BLOOD BY AUTOMATED COUNT: 40.6 FL (ref 35.9–50)
ERYTHROCYTE [DISTWIDTH] IN BLOOD BY AUTOMATED COUNT: 41.1 FL (ref 35.9–50)
ERYTHROCYTE [DISTWIDTH] IN BLOOD BY AUTOMATED COUNT: 41.1 FL (ref 35.9–50)
ERYTHROCYTE [DISTWIDTH] IN BLOOD BY AUTOMATED COUNT: 41.2 FL (ref 35.9–50)
ERYTHROCYTE [DISTWIDTH] IN BLOOD BY AUTOMATED COUNT: 41.3 FL (ref 35.9–50)
ERYTHROCYTE [DISTWIDTH] IN BLOOD BY AUTOMATED COUNT: 41.8 FL (ref 35.9–50)
ERYTHROCYTE [DISTWIDTH] IN BLOOD BY AUTOMATED COUNT: 41.9 FL (ref 35.9–50)
ERYTHROCYTE [DISTWIDTH] IN BLOOD BY AUTOMATED COUNT: 42 FL (ref 35.9–50)
ERYTHROCYTE [DISTWIDTH] IN BLOOD BY AUTOMATED COUNT: 42.3 FL (ref 35.9–50)
ERYTHROCYTE [DISTWIDTH] IN BLOOD BY AUTOMATED COUNT: 43 FL (ref 35.9–50)
ERYTHROCYTE [DISTWIDTH] IN BLOOD BY AUTOMATED COUNT: 43 FL (ref 35.9–50)
ERYTHROCYTE [DISTWIDTH] IN BLOOD BY AUTOMATED COUNT: 43.2 FL (ref 35.9–50)
ERYTHROCYTE [DISTWIDTH] IN BLOOD BY AUTOMATED COUNT: 43.4 FL (ref 35.9–50)
ERYTHROCYTE [DISTWIDTH] IN BLOOD BY AUTOMATED COUNT: 43.9 FL (ref 35.9–50)
ERYTHROCYTE [DISTWIDTH] IN BLOOD BY AUTOMATED COUNT: 44 FL (ref 35.9–50)
ERYTHROCYTE [DISTWIDTH] IN BLOOD BY AUTOMATED COUNT: 45.3 FL (ref 35.9–50)
ERYTHROCYTE [DISTWIDTH] IN BLOOD BY AUTOMATED COUNT: 51.6 FL (ref 35.9–50)
EST. AVERAGE GLUCOSE BLD GHB EST-MCNC: 117 MG/DL
ETHANOL BLD-MCNC: <10.1 MG/DL
FENTANYL UR QL: POSITIVE
FLUAV RNA SPEC QL NAA+PROBE: NEGATIVE
FLUAV RNA SPEC QL NAA+PROBE: NEGATIVE
FLUBV RNA SPEC QL NAA+PROBE: NEGATIVE
FLUBV RNA SPEC QL NAA+PROBE: NEGATIVE
FUNGUS SPEC FUNGUS STN: NORMAL
GAMMA INTERFERON BACKGROUND BLD IA-ACNC: 0.03 IU/ML
GFR SERPLBLD CREATININE-BSD FMLA CKD-EPI: 10 ML/MIN/1.73 M 2
GFR SERPLBLD CREATININE-BSD FMLA CKD-EPI: 11 ML/MIN/1.73 M 2
GFR SERPLBLD CREATININE-BSD FMLA CKD-EPI: 12 ML/MIN/1.73 M 2
GFR SERPLBLD CREATININE-BSD FMLA CKD-EPI: 12 ML/MIN/1.73 M 2
GFR SERPLBLD CREATININE-BSD FMLA CKD-EPI: 14 ML/MIN/1.73 M 2
GFR SERPLBLD CREATININE-BSD FMLA CKD-EPI: 14 ML/MIN/1.73 M 2
GFR SERPLBLD CREATININE-BSD FMLA CKD-EPI: 17 ML/MIN/1.73 M 2
GFR SERPLBLD CREATININE-BSD FMLA CKD-EPI: 17 ML/MIN/1.73 M 2
GFR SERPLBLD CREATININE-BSD FMLA CKD-EPI: 18 ML/MIN/1.73 M 2
GFR SERPLBLD CREATININE-BSD FMLA CKD-EPI: 19 ML/MIN/1.73 M 2
GFR SERPLBLD CREATININE-BSD FMLA CKD-EPI: 23 ML/MIN/1.73 M 2
GFR SERPLBLD CREATININE-BSD FMLA CKD-EPI: 26 ML/MIN/1.73 M 2
GFR SERPLBLD CREATININE-BSD FMLA CKD-EPI: 35 ML/MIN/1.73 M 2
GFR SERPLBLD CREATININE-BSD FMLA CKD-EPI: 35 ML/MIN/1.73 M 2
GFR SERPLBLD CREATININE-BSD FMLA CKD-EPI: 36 ML/MIN/1.73 M 2
GFR SERPLBLD CREATININE-BSD FMLA CKD-EPI: 36 ML/MIN/1.73 M 2
GFR SERPLBLD CREATININE-BSD FMLA CKD-EPI: 4 ML/MIN/1.73 M 2
GFR SERPLBLD CREATININE-BSD FMLA CKD-EPI: 4 ML/MIN/1.73 M 2
GFR SERPLBLD CREATININE-BSD FMLA CKD-EPI: 5 ML/MIN/1.73 M 2
GFR SERPLBLD CREATININE-BSD FMLA CKD-EPI: 6 ML/MIN/1.73 M 2
GFR SERPLBLD CREATININE-BSD FMLA CKD-EPI: 7 ML/MIN/1.73 M 2
GFR SERPLBLD CREATININE-BSD FMLA CKD-EPI: 8 ML/MIN/1.73 M 2
GFR SERPLBLD CREATININE-BSD FMLA CKD-EPI: 8 ML/MIN/1.73 M 2
GFR SERPLBLD CREATININE-BSD FMLA CKD-EPI: 9 ML/MIN/1.73 M 2
GFR SERPLBLD CREATININE-BSD FMLA CKD-EPI: 9 ML/MIN/1.73 M 2
GGT SERPL-CCNC: 229 U/L (ref 7–51)
GGT SERPL-CCNC: 247 U/L (ref 7–51)
GGT SERPL-CCNC: 260 U/L (ref 7–51)
GGT SERPL-CCNC: 266 U/L (ref 7–51)
GGT SERPL-CCNC: 269 U/L (ref 7–51)
GGT SERPL-CCNC: 278 U/L (ref 7–51)
GGT SERPL-CCNC: 291 U/L (ref 7–51)
GGT SERPL-CCNC: 320 U/L (ref 7–51)
GGT SERPL-CCNC: 326 U/L (ref 7–51)
GGT SERPL-CCNC: 329 U/L (ref 7–51)
GGT SERPL-CCNC: 330 U/L (ref 7–51)
GGT SERPL-CCNC: 332 U/L (ref 7–51)
GGT SERPL-CCNC: 337 U/L (ref 7–51)
GGT SERPL-CCNC: 341 U/L (ref 7–51)
GGT SERPL-CCNC: 350 U/L (ref 7–51)
GLOBULIN SER CALC-MCNC: 2.1 G/DL (ref 1.9–3.5)
GLOBULIN SER CALC-MCNC: 2.3 G/DL (ref 1.9–3.5)
GLOBULIN SER CALC-MCNC: 2.5 G/DL (ref 1.9–3.5)
GLOBULIN SER CALC-MCNC: 2.5 G/DL (ref 1.9–3.5)
GLOBULIN SER CALC-MCNC: 2.8 G/DL (ref 1.9–3.5)
GLOBULIN SER CALC-MCNC: 3 G/DL (ref 1.9–3.5)
GLOBULIN SER CALC-MCNC: 3.2 G/DL (ref 1.9–3.5)
GLOBULIN SER CALC-MCNC: 3.3 G/DL (ref 1.9–3.5)
GLOBULIN SER CALC-MCNC: 3.3 G/DL (ref 1.9–3.5)
GLOBULIN SER CALC-MCNC: 3.4 G/DL (ref 1.9–3.5)
GLOBULIN SER CALC-MCNC: 3.5 G/DL (ref 1.9–3.5)
GLOBULIN SER CALC-MCNC: 3.5 G/DL (ref 1.9–3.5)
GLOBULIN SER CALC-MCNC: 3.6 G/DL (ref 1.9–3.5)
GLOBULIN SER CALC-MCNC: 3.6 G/DL (ref 1.9–3.5)
GLOBULIN SER CALC-MCNC: 3.7 G/DL (ref 1.9–3.5)
GLOBULIN SER CALC-MCNC: 3.7 G/DL (ref 1.9–3.5)
GLUCOSE BLD STRIP.AUTO-MCNC: 100 MG/DL (ref 65–99)
GLUCOSE BLD STRIP.AUTO-MCNC: 101 MG/DL (ref 65–99)
GLUCOSE BLD STRIP.AUTO-MCNC: 103 MG/DL (ref 65–99)
GLUCOSE BLD STRIP.AUTO-MCNC: 106 MG/DL (ref 65–99)
GLUCOSE BLD STRIP.AUTO-MCNC: 107 MG/DL (ref 65–99)
GLUCOSE BLD STRIP.AUTO-MCNC: 107 MG/DL (ref 65–99)
GLUCOSE BLD STRIP.AUTO-MCNC: 109 MG/DL (ref 65–99)
GLUCOSE BLD STRIP.AUTO-MCNC: 110 MG/DL (ref 65–99)
GLUCOSE BLD STRIP.AUTO-MCNC: 111 MG/DL (ref 65–99)
GLUCOSE BLD STRIP.AUTO-MCNC: 111 MG/DL (ref 65–99)
GLUCOSE BLD STRIP.AUTO-MCNC: 112 MG/DL (ref 65–99)
GLUCOSE BLD STRIP.AUTO-MCNC: 114 MG/DL (ref 65–99)
GLUCOSE BLD STRIP.AUTO-MCNC: 114 MG/DL (ref 65–99)
GLUCOSE BLD STRIP.AUTO-MCNC: 118 MG/DL (ref 65–99)
GLUCOSE BLD STRIP.AUTO-MCNC: 124 MG/DL (ref 65–99)
GLUCOSE BLD STRIP.AUTO-MCNC: 124 MG/DL (ref 65–99)
GLUCOSE BLD STRIP.AUTO-MCNC: 127 MG/DL (ref 65–99)
GLUCOSE BLD STRIP.AUTO-MCNC: 128 MG/DL (ref 65–99)
GLUCOSE BLD STRIP.AUTO-MCNC: 129 MG/DL (ref 65–99)
GLUCOSE BLD STRIP.AUTO-MCNC: 130 MG/DL (ref 65–99)
GLUCOSE BLD STRIP.AUTO-MCNC: 132 MG/DL (ref 65–99)
GLUCOSE BLD STRIP.AUTO-MCNC: 150 MG/DL (ref 65–99)
GLUCOSE BLD STRIP.AUTO-MCNC: 158 MG/DL (ref 65–99)
GLUCOSE BLD STRIP.AUTO-MCNC: 174 MG/DL (ref 65–99)
GLUCOSE BLD STRIP.AUTO-MCNC: 226 MG/DL (ref 65–99)
GLUCOSE BLD STRIP.AUTO-MCNC: 86 MG/DL (ref 65–99)
GLUCOSE BLD STRIP.AUTO-MCNC: 93 MG/DL (ref 65–99)
GLUCOSE BLD STRIP.AUTO-MCNC: 95 MG/DL (ref 65–99)
GLUCOSE SERPL-MCNC: 102 MG/DL (ref 65–99)
GLUCOSE SERPL-MCNC: 102 MG/DL (ref 65–99)
GLUCOSE SERPL-MCNC: 105 MG/DL (ref 65–99)
GLUCOSE SERPL-MCNC: 105 MG/DL (ref 65–99)
GLUCOSE SERPL-MCNC: 106 MG/DL (ref 65–99)
GLUCOSE SERPL-MCNC: 107 MG/DL (ref 65–99)
GLUCOSE SERPL-MCNC: 109 MG/DL (ref 65–99)
GLUCOSE SERPL-MCNC: 110 MG/DL (ref 65–99)
GLUCOSE SERPL-MCNC: 111 MG/DL (ref 65–99)
GLUCOSE SERPL-MCNC: 112 MG/DL (ref 65–99)
GLUCOSE SERPL-MCNC: 112 MG/DL (ref 65–99)
GLUCOSE SERPL-MCNC: 114 MG/DL (ref 65–99)
GLUCOSE SERPL-MCNC: 114 MG/DL (ref 65–99)
GLUCOSE SERPL-MCNC: 116 MG/DL (ref 65–99)
GLUCOSE SERPL-MCNC: 116 MG/DL (ref 65–99)
GLUCOSE SERPL-MCNC: 117 MG/DL (ref 65–99)
GLUCOSE SERPL-MCNC: 117 MG/DL (ref 65–99)
GLUCOSE SERPL-MCNC: 118 MG/DL (ref 65–99)
GLUCOSE SERPL-MCNC: 119 MG/DL (ref 65–99)
GLUCOSE SERPL-MCNC: 123 MG/DL (ref 65–99)
GLUCOSE SERPL-MCNC: 125 MG/DL (ref 65–99)
GLUCOSE SERPL-MCNC: 126 MG/DL (ref 65–99)
GLUCOSE SERPL-MCNC: 128 MG/DL (ref 65–99)
GLUCOSE SERPL-MCNC: 129 MG/DL (ref 65–99)
GLUCOSE SERPL-MCNC: 130 MG/DL (ref 65–99)
GLUCOSE SERPL-MCNC: 131 MG/DL (ref 65–99)
GLUCOSE SERPL-MCNC: 131 MG/DL (ref 65–99)
GLUCOSE SERPL-MCNC: 132 MG/DL (ref 65–99)
GLUCOSE SERPL-MCNC: 133 MG/DL (ref 65–99)
GLUCOSE SERPL-MCNC: 134 MG/DL (ref 65–99)
GLUCOSE SERPL-MCNC: 135 MG/DL (ref 65–99)
GLUCOSE SERPL-MCNC: 174 MG/DL (ref 65–99)
GLUCOSE SERPL-MCNC: 183 MG/DL (ref 65–99)
GLUCOSE SERPL-MCNC: 48 MG/DL (ref 65–99)
GLUCOSE SERPL-MCNC: 88 MG/DL (ref 65–99)
GLUCOSE SERPL-MCNC: 95 MG/DL (ref 65–99)
GLUCOSE UR STRIP.AUTO-MCNC: 100 MG/DL
GLUCOSE UR STRIP.AUTO-MCNC: 250 MG/DL
GLUCOSE UR STRIP.AUTO-MCNC: 250 MG/DL
GLUCOSE UR STRIP.AUTO-MCNC: NEGATIVE MG/DL
GRAM STN SPEC: NORMAL
HAV IGM SERPL QL IA: NORMAL
HBA1C MFR BLD: 5.7 % (ref 4–5.6)
HBV CORE IGM SER QL: NORMAL
HBV SURFACE AG SER QL: NORMAL
HCO3 BLDA-SCNC: 11.2 MMOL/L (ref 17–25)
HCO3 BLDA-SCNC: 13.2 MMOL/L (ref 17–25)
HCO3 BLDA-SCNC: 15.6 MMOL/L (ref 17–25)
HCO3 BLDA-SCNC: 17.5 MMOL/L (ref 17–25)
HCO3 BLDA-SCNC: 21.7 MMOL/L (ref 17–25)
HCO3 BLDA-SCNC: 25 MMOL/L (ref 17–25)
HCO3 BLDA-SCNC: 25.1 MMOL/L (ref 17–25)
HCO3 BLDA-SCNC: 29 MMOL/L (ref 17–25)
HCO3 BLDA-SCNC: 29 MMOL/L (ref 17–25)
HCO3 BLDA-SCNC: 30.4 MMOL/L (ref 17–25)
HCO3 BLDA-SCNC: 30.7 MMOL/L (ref 17–25)
HCO3 BLDA-SCNC: 30.8 MMOL/L (ref 17–25)
HCO3 BLDA-SCNC: 31.7 MMOL/L (ref 17–25)
HCO3 BLDA-SCNC: 32 MMOL/L (ref 17–25)
HCO3 BLDA-SCNC: 32.2 MMOL/L (ref 17–25)
HCO3 BLDA-SCNC: 32.5 MMOL/L (ref 17–25)
HCO3 BLDA-SCNC: 32.7 MMOL/L (ref 17–25)
HCO3 BLDA-SCNC: 33.3 MMOL/L (ref 17–25)
HCO3 BLDA-SCNC: 33.4 MMOL/L (ref 17–25)
HCO3 BLDA-SCNC: 33.6 MMOL/L (ref 17–25)
HCO3 BLDA-SCNC: 33.6 MMOL/L (ref 17–25)
HCO3 BLDA-SCNC: 34.9 MMOL/L (ref 17–25)
HCO3 BLDA-SCNC: 35.2 MMOL/L (ref 17–25)
HCT VFR BLD AUTO: 19.1 % (ref 42–52)
HCT VFR BLD AUTO: 20.3 % (ref 42–52)
HCT VFR BLD AUTO: 23.4 % (ref 42–52)
HCT VFR BLD AUTO: 24.1 % (ref 42–52)
HCT VFR BLD AUTO: 24.4 % (ref 42–52)
HCT VFR BLD AUTO: 25.1 % (ref 42–52)
HCT VFR BLD AUTO: 25.4 % (ref 42–52)
HCT VFR BLD AUTO: 26.1 % (ref 42–52)
HCT VFR BLD AUTO: 26.4 % (ref 42–52)
HCT VFR BLD AUTO: 26.5 % (ref 42–52)
HCT VFR BLD AUTO: 26.5 % (ref 42–52)
HCT VFR BLD AUTO: 26.8 % (ref 42–52)
HCT VFR BLD AUTO: 27 % (ref 42–52)
HCT VFR BLD AUTO: 28.8 % (ref 42–52)
HCT VFR BLD AUTO: 29.4 % (ref 42–52)
HCT VFR BLD AUTO: 29.4 % (ref 42–52)
HCT VFR BLD AUTO: 32.2 % (ref 42–52)
HCT VFR BLD AUTO: 36.8 % (ref 42–52)
HCT VFR BLD AUTO: 40.7 % (ref 42–52)
HCT VFR BLD AUTO: 42.7 % (ref 42–52)
HCV AB SER QL: NORMAL
HGB BLD-MCNC: 10.1 G/DL (ref 14–18)
HGB BLD-MCNC: 10.2 G/DL (ref 14–18)
HGB BLD-MCNC: 10.4 G/DL (ref 14–18)
HGB BLD-MCNC: 11.9 G/DL (ref 14–18)
HGB BLD-MCNC: 12.1 G/DL (ref 14–18)
HGB BLD-MCNC: 12.8 G/DL (ref 14–18)
HGB BLD-MCNC: 14.4 G/DL (ref 14–18)
HGB BLD-MCNC: 6.4 G/DL (ref 14–18)
HGB BLD-MCNC: 6.9 G/DL (ref 14–18)
HGB BLD-MCNC: 8.1 G/DL (ref 14–18)
HGB BLD-MCNC: 8.5 G/DL (ref 14–18)
HGB BLD-MCNC: 8.6 G/DL (ref 14–18)
HGB BLD-MCNC: 8.7 G/DL (ref 14–18)
HGB BLD-MCNC: 8.8 G/DL (ref 14–18)
HGB BLD-MCNC: 9 G/DL (ref 14–18)
HGB BLD-MCNC: 9 G/DL (ref 14–18)
HGB BLD-MCNC: 9.1 G/DL (ref 14–18)
HGB BLD-MCNC: 9.2 G/DL (ref 14–18)
HGB BLD-MCNC: 9.2 G/DL (ref 14–18)
HGB BLD-MCNC: 9.3 G/DL (ref 14–18)
HOROWITZ INDEX BLDA+IHG-RTO: 177 MM[HG]
HOROWITZ INDEX BLDA+IHG-RTO: 194 MM[HG]
HOROWITZ INDEX BLDA+IHG-RTO: 198 MM[HG]
HOROWITZ INDEX BLDA+IHG-RTO: 247 MM[HG]
HOROWITZ INDEX BLDA+IHG-RTO: 280 MM[HG]
HOROWITZ INDEX BLDA+IHG-RTO: 320 MM[HG]
HOROWITZ INDEX BLDA+IHG-RTO: 323 MM[HG]
HOROWITZ INDEX BLDA+IHG-RTO: 327 MM[HG]
HOROWITZ INDEX BLDA+IHG-RTO: 330 MM[HG]
HOROWITZ INDEX BLDA+IHG-RTO: 330 MM[HG]
HOROWITZ INDEX BLDA+IHG-RTO: 332 MM[HG]
HOROWITZ INDEX BLDA+IHG-RTO: 333 MM[HG]
HOROWITZ INDEX BLDA+IHG-RTO: 336 MM[HG]
HOROWITZ INDEX BLDA+IHG-RTO: 344 MM[HG]
HOROWITZ INDEX BLDA+IHG-RTO: 346 MM[HG]
HOROWITZ INDEX BLDA+IHG-RTO: 373 MM[HG]
HOROWITZ INDEX BLDA+IHG-RTO: 382 MM[HG]
HOROWITZ INDEX BLDA+IHG-RTO: 398 MM[HG]
HOROWITZ INDEX BLDA+IHG-RTO: 404 MM[HG]
HOROWITZ INDEX BLDA+IHG-RTO: 421 MM[HG]
HOROWITZ INDEX BLDA+IHG-RTO: 422 MM[HG]
HOROWITZ INDEX BLDA+IHG-RTO: 63 MM[HG]
HYALINE CASTS #/AREA URNS LPF: ABNORMAL /LPF
IMM GRANULOCYTES # BLD AUTO: 0.06 K/UL (ref 0–0.11)
IMM GRANULOCYTES # BLD AUTO: 0.06 K/UL (ref 0–0.11)
IMM GRANULOCYTES # BLD AUTO: 0.07 K/UL (ref 0–0.11)
IMM GRANULOCYTES # BLD AUTO: 0.07 K/UL (ref 0–0.11)
IMM GRANULOCYTES # BLD AUTO: 0.1 K/UL (ref 0–0.11)
IMM GRANULOCYTES # BLD AUTO: 0.11 K/UL (ref 0–0.11)
IMM GRANULOCYTES # BLD AUTO: 0.11 K/UL (ref 0–0.11)
IMM GRANULOCYTES # BLD AUTO: 0.12 K/UL (ref 0–0.11)
IMM GRANULOCYTES # BLD AUTO: 0.13 K/UL (ref 0–0.11)
IMM GRANULOCYTES # BLD AUTO: 0.13 K/UL (ref 0–0.11)
IMM GRANULOCYTES # BLD AUTO: 0.14 K/UL (ref 0–0.11)
IMM GRANULOCYTES # BLD AUTO: 0.15 K/UL (ref 0–0.11)
IMM GRANULOCYTES # BLD AUTO: 0.18 K/UL (ref 0–0.11)
IMM GRANULOCYTES # BLD AUTO: 0.19 K/UL (ref 0–0.11)
IMM GRANULOCYTES # BLD AUTO: 0.2 K/UL (ref 0–0.11)
IMM GRANULOCYTES # BLD AUTO: 0.23 K/UL (ref 0–0.11)
IMM GRANULOCYTES # BLD AUTO: 0.3 K/UL (ref 0–0.11)
IMM GRANULOCYTES # BLD AUTO: 0.35 K/UL (ref 0–0.11)
IMM GRANULOCYTES NFR BLD AUTO: 0.6 % (ref 0–0.9)
IMM GRANULOCYTES NFR BLD AUTO: 0.6 % (ref 0–0.9)
IMM GRANULOCYTES NFR BLD AUTO: 0.7 % (ref 0–0.9)
IMM GRANULOCYTES NFR BLD AUTO: 0.8 % (ref 0–0.9)
IMM GRANULOCYTES NFR BLD AUTO: 0.9 % (ref 0–0.9)
IMM GRANULOCYTES NFR BLD AUTO: 1.1 % (ref 0–0.9)
IMM GRANULOCYTES NFR BLD AUTO: 1.2 % (ref 0–0.9)
IMM GRANULOCYTES NFR BLD AUTO: 1.3 % (ref 0–0.9)
IMM GRANULOCYTES NFR BLD AUTO: 1.4 % (ref 0–0.9)
IMM GRANULOCYTES NFR BLD AUTO: 1.4 % (ref 0–0.9)
IMM GRANULOCYTES NFR BLD AUTO: 1.8 % (ref 0–0.9)
INHALED O2 FLOW RATE: 100 L/MIN
INHALED O2 FLOW RATE: ABNORMAL L/MIN
INHALED O2 FLOW RATE: ABNORMAL L/MIN
INR PPP: 0.98 (ref 0.87–1.13)
INR PPP: 0.99 (ref 0.87–1.13)
INR PPP: 1 (ref 0.87–1.13)
INR PPP: 1.01 (ref 0.87–1.13)
INR PPP: 1.02 (ref 0.87–1.13)
INR PPP: 1.03 (ref 0.87–1.13)
INR PPP: 1.04 (ref 0.87–1.13)
INR PPP: 1.11 (ref 0.87–1.13)
INR PPP: 1.13 (ref 0.87–1.13)
INR PPP: 1.65 (ref 0.87–1.13)
KETONES UR STRIP.AUTO-MCNC: NEGATIVE MG/DL
LACTATE BLD-SCNC: 0.5 MMOL/L (ref 0.5–2)
LACTATE BLD-SCNC: 0.5 MMOL/L (ref 0.5–2)
LACTATE BLD-SCNC: 0.6 MMOL/L (ref 0.5–2)
LACTATE BLD-SCNC: 0.6 MMOL/L (ref 0.5–2)
LACTATE BLD-SCNC: 0.7 MMOL/L (ref 0.5–2)
LACTATE BLD-SCNC: 0.7 MMOL/L (ref 0.5–2)
LACTATE BLD-SCNC: 0.8 MMOL/L (ref 0.5–2)
LACTATE BLD-SCNC: 0.9 MMOL/L (ref 0.5–2)
LACTATE BLD-SCNC: 1.1 MMOL/L (ref 0.5–2)
LACTATE BLD-SCNC: 1.8 MMOL/L (ref 0.5–2)
LACTATE BLD-SCNC: 1.8 MMOL/L (ref 0.5–2)
LACTATE BLD-SCNC: 11.5 MMOL/L (ref 0.5–2)
LACTATE BLD-SCNC: 2.1 MMOL/L (ref 0.5–2)
LACTATE BLD-SCNC: 3.2 MMOL/L (ref 0.5–2)
LACTATE BLD-SCNC: 5.4 MMOL/L (ref 0.5–2)
LACTATE SERPL-SCNC: 12.7 MMOL/L (ref 0.5–2)
LACTATE SERPL-SCNC: 13.1 MMOL/L (ref 0.5–2)
LACTATE SERPL-SCNC: 13.3 MMOL/L (ref 0.5–2)
LACTATE SERPL-SCNC: 3.3 MMOL/L (ref 0.5–2)
LACTATE SERPL-SCNC: 3.4 MMOL/L (ref 0.5–2)
LACTATE SERPL-SCNC: 4 MMOL/L (ref 0.5–2)
LACTATE SERPL-SCNC: 6.3 MMOL/L (ref 0.5–2)
LEUKOCYTE ESTERASE UR QL STRIP.AUTO: ABNORMAL
LEUKOCYTE ESTERASE UR QL STRIP.AUTO: NEGATIVE
LEUKOCYTE ESTERASE UR QL STRIP.AUTO: NEGATIVE
LIPASE SERPL-CCNC: 417 U/L (ref 11–82)
LV EJECT FRACT  99904: 15
LV EJECT FRACT  99904: 25
LV EJECT FRACT  99904: 48
LV EJECT FRACT MOD 2C 99903: 47.82
LV EJECT FRACT MOD 2C 99903: 57.45
LV EJECT FRACT MOD 2C 99903: 63.41
LV EJECT FRACT MOD 4C 99902: 46.09
LV EJECT FRACT MOD 4C 99902: 50.24
LV EJECT FRACT MOD 4C 99902: 52.64
LV EJECT FRACT MOD BP 99901: 47.2
LV EJECT FRACT MOD BP 99901: 54.7
LV EJECT FRACT MOD BP 99901: 60.88
LYMPHOCYTES # BLD AUTO: 0.63 K/UL (ref 1–4.8)
LYMPHOCYTES # BLD AUTO: 0.64 K/UL (ref 1–4.8)
LYMPHOCYTES # BLD AUTO: 0.72 K/UL (ref 1–4.8)
LYMPHOCYTES # BLD AUTO: 0.83 K/UL (ref 1–4.8)
LYMPHOCYTES # BLD AUTO: 0.86 K/UL (ref 1–4.8)
LYMPHOCYTES # BLD AUTO: 0.89 K/UL (ref 1–4.8)
LYMPHOCYTES # BLD AUTO: 0.92 K/UL (ref 1–4.8)
LYMPHOCYTES # BLD AUTO: 0.97 K/UL (ref 1–4.8)
LYMPHOCYTES # BLD AUTO: 0.98 K/UL (ref 1–4.8)
LYMPHOCYTES # BLD AUTO: 1.01 K/UL (ref 1–4.8)
LYMPHOCYTES # BLD AUTO: 1.15 K/UL (ref 1–4.8)
LYMPHOCYTES # BLD AUTO: 1.19 K/UL (ref 1–4.8)
LYMPHOCYTES # BLD AUTO: 1.22 K/UL (ref 1–4.8)
LYMPHOCYTES # BLD AUTO: 1.23 K/UL (ref 1–4.8)
LYMPHOCYTES # BLD AUTO: 1.24 K/UL (ref 1–4.8)
LYMPHOCYTES # BLD AUTO: 1.31 K/UL (ref 1–4.8)
LYMPHOCYTES # BLD AUTO: 1.34 K/UL (ref 1–4.8)
LYMPHOCYTES # BLD AUTO: 1.48 K/UL (ref 1–4.8)
LYMPHOCYTES # BLD AUTO: 1.91 K/UL (ref 1–4.8)
LYMPHOCYTES # BLD AUTO: 2.43 K/UL (ref 1–4.8)
LYMPHOCYTES NFR BLD: 10.6 % (ref 22–41)
LYMPHOCYTES NFR BLD: 10.7 % (ref 22–41)
LYMPHOCYTES NFR BLD: 11.1 % (ref 22–41)
LYMPHOCYTES NFR BLD: 11.2 % (ref 22–41)
LYMPHOCYTES NFR BLD: 12.3 % (ref 22–41)
LYMPHOCYTES NFR BLD: 15.4 % (ref 22–41)
LYMPHOCYTES NFR BLD: 3.2 % (ref 22–41)
LYMPHOCYTES NFR BLD: 3.4 % (ref 22–41)
LYMPHOCYTES NFR BLD: 4.2 % (ref 22–41)
LYMPHOCYTES NFR BLD: 5.1 % (ref 22–41)
LYMPHOCYTES NFR BLD: 5.2 % (ref 22–41)
LYMPHOCYTES NFR BLD: 5.5 % (ref 22–41)
LYMPHOCYTES NFR BLD: 6 % (ref 22–41)
LYMPHOCYTES NFR BLD: 7.3 % (ref 22–41)
LYMPHOCYTES NFR BLD: 7.7 % (ref 22–41)
LYMPHOCYTES NFR BLD: 8.2 % (ref 22–41)
LYMPHOCYTES NFR BLD: 8.4 % (ref 22–41)
LYMPHOCYTES NFR BLD: 9 % (ref 22–41)
LYMPHOCYTES NFR BLD: 9 % (ref 22–41)
LYMPHOCYTES NFR BLD: 9.1 % (ref 22–41)
M TB IFN-G BLD-IMP: NEGATIVE
M TB IFN-G CD4+ BCKGRND COR BLD-ACNC: 0 IU/ML
MACROCYTES BLD QL SMEAR: ABNORMAL
MAGNESIUM SERPL-MCNC: 2 MG/DL (ref 1.5–2.5)
MAGNESIUM SERPL-MCNC: 2 MG/DL (ref 1.5–2.5)
MAGNESIUM SERPL-MCNC: 2.2 MG/DL (ref 1.5–2.5)
MAGNESIUM SERPL-MCNC: 2.2 MG/DL (ref 1.5–2.5)
MAGNESIUM SERPL-MCNC: 2.7 MG/DL (ref 1.5–2.5)
MAGNESIUM SERPL-MCNC: 2.8 MG/DL (ref 1.5–2.5)
MAGNESIUM SERPL-MCNC: 3.4 MG/DL (ref 1.5–2.5)
MAGNESIUM SERPL-MCNC: 3.4 MG/DL (ref 1.5–2.5)
MAGNESIUM SERPL-MCNC: 3.5 MG/DL (ref 1.5–2.5)
MAGNESIUM SERPL-MCNC: 3.6 MG/DL (ref 1.5–2.5)
MAGNESIUM SERPL-MCNC: 3.7 MG/DL (ref 1.5–2.5)
MAGNESIUM SERPL-MCNC: 3.8 MG/DL (ref 1.5–2.5)
MAGNESIUM SERPL-MCNC: 4 MG/DL (ref 1.5–2.5)
MAGNESIUM SERPL-MCNC: 4.2 MG/DL (ref 1.5–2.5)
MAGNESIUM SERPL-MCNC: 4.3 MG/DL (ref 1.5–2.5)
MAGNESIUM SERPL-MCNC: 4.4 MG/DL (ref 1.5–2.5)
MAGNESIUM SERPL-MCNC: 4.5 MG/DL (ref 1.5–2.5)
MAGNESIUM SERPL-MCNC: 4.5 MG/DL (ref 1.5–2.5)
MAGNESIUM SERPL-MCNC: 4.6 MG/DL (ref 1.5–2.5)
MAGNESIUM SERPL-MCNC: 4.6 MG/DL (ref 1.5–2.5)
MAGNESIUM SERPL-MCNC: 4.7 MG/DL (ref 1.5–2.5)
MAGNESIUM SERPL-MCNC: 4.8 MG/DL (ref 1.5–2.5)
MAGNESIUM SERPL-MCNC: 4.8 MG/DL (ref 1.5–2.5)
MAGNESIUM SERPL-MCNC: 5 MG/DL (ref 1.5–2.5)
MANUAL DIFF BLD: NORMAL
MANUAL DIFF BLD: NORMAL
MCF BLD TEG: 68.6 MM (ref 52–69)
MCF.PLATELET INHIB BLD ROTEM: 45.3 MM (ref 15–32)
MCH RBC QN AUTO: 26.6 PG (ref 27–33)
MCH RBC QN AUTO: 26.7 PG (ref 27–33)
MCH RBC QN AUTO: 26.7 PG (ref 27–33)
MCH RBC QN AUTO: 26.8 PG (ref 27–33)
MCH RBC QN AUTO: 26.9 PG (ref 27–33)
MCH RBC QN AUTO: 27 PG (ref 27–33)
MCH RBC QN AUTO: 27.2 PG (ref 27–33)
MCH RBC QN AUTO: 27.4 PG (ref 27–33)
MCH RBC QN AUTO: 27.5 PG (ref 27–33)
MCH RBC QN AUTO: 27.7 PG (ref 27–33)
MCH RBC QN AUTO: 27.8 PG (ref 27–33)
MCHC RBC AUTO-ENTMCNC: 29.7 G/DL (ref 32.3–36.5)
MCHC RBC AUTO-ENTMCNC: 33.5 G/DL (ref 32.3–36.5)
MCHC RBC AUTO-ENTMCNC: 33.6 G/DL (ref 32.3–36.5)
MCHC RBC AUTO-ENTMCNC: 33.7 G/DL (ref 32.3–36.5)
MCHC RBC AUTO-ENTMCNC: 34 G/DL (ref 32.3–36.5)
MCHC RBC AUTO-ENTMCNC: 34.3 G/DL (ref 32.3–36.5)
MCHC RBC AUTO-ENTMCNC: 34.4 G/DL (ref 32.3–36.5)
MCHC RBC AUTO-ENTMCNC: 34.4 G/DL (ref 32.3–36.5)
MCHC RBC AUTO-ENTMCNC: 34.5 G/DL (ref 32.3–36.5)
MCHC RBC AUTO-ENTMCNC: 34.6 G/DL (ref 32.3–36.5)
MCHC RBC AUTO-ENTMCNC: 34.6 G/DL (ref 32.3–36.5)
MCHC RBC AUTO-ENTMCNC: 34.7 G/DL (ref 32.3–36.5)
MCHC RBC AUTO-ENTMCNC: 34.7 G/DL (ref 32.3–36.5)
MCHC RBC AUTO-ENTMCNC: 34.8 G/DL (ref 32.3–36.5)
MCHC RBC AUTO-ENTMCNC: 34.8 G/DL (ref 32.3–36.5)
MCHC RBC AUTO-ENTMCNC: 35.2 G/DL (ref 32.3–36.5)
MCHC RBC AUTO-ENTMCNC: 35.3 G/DL (ref 32.3–36.5)
MCHC RBC AUTO-ENTMCNC: 35.4 G/DL (ref 32.3–36.5)
MCHC RBC AUTO-ENTMCNC: 35.4 G/DL (ref 32.3–36.5)
MCHC RBC AUTO-ENTMCNC: 37 G/DL (ref 32.3–36.5)
MCV RBC AUTO: 74.5 FL (ref 81.4–97.8)
MCV RBC AUTO: 75.2 FL (ref 81.4–97.8)
MCV RBC AUTO: 76.5 FL (ref 81.4–97.8)
MCV RBC AUTO: 77 FL (ref 81.4–97.8)
MCV RBC AUTO: 77.2 FL (ref 81.4–97.8)
MCV RBC AUTO: 77.3 FL (ref 81.4–97.8)
MCV RBC AUTO: 77.4 FL (ref 81.4–97.8)
MCV RBC AUTO: 77.5 FL (ref 81.4–97.8)
MCV RBC AUTO: 77.6 FL (ref 81.4–97.8)
MCV RBC AUTO: 77.7 FL (ref 81.4–97.8)
MCV RBC AUTO: 77.8 FL (ref 81.4–97.8)
MCV RBC AUTO: 77.9 FL (ref 81.4–97.8)
MCV RBC AUTO: 78.3 FL (ref 81.4–97.8)
MCV RBC AUTO: 78.5 FL (ref 81.4–97.8)
MCV RBC AUTO: 79 FL (ref 81.4–97.8)
MCV RBC AUTO: 79.3 FL (ref 81.4–97.8)
MCV RBC AUTO: 80.1 FL (ref 81.4–97.8)
MCV RBC AUTO: 80.1 FL (ref 81.4–97.8)
MCV RBC AUTO: 80.3 FL (ref 81.4–97.8)
MCV RBC AUTO: 90.8 FL (ref 81.4–97.8)
METHADONE UR QL SCN: NEGATIVE
MICRO URNS: ABNORMAL
MICROCYTES BLD QL SMEAR: ABNORMAL
MITOGEN IGNF BCKGRD COR BLD-ACNC: 3.27 IU/ML
MODE IMODE: ABNORMAL
MONOCYTES # BLD AUTO: 0.4 K/UL (ref 0–0.85)
MONOCYTES # BLD AUTO: 0.78 K/UL (ref 0–0.85)
MONOCYTES # BLD AUTO: 0.91 K/UL (ref 0–0.85)
MONOCYTES # BLD AUTO: 0.93 K/UL (ref 0–0.85)
MONOCYTES # BLD AUTO: 0.94 K/UL (ref 0–0.85)
MONOCYTES # BLD AUTO: 0.94 K/UL (ref 0–0.85)
MONOCYTES # BLD AUTO: 0.97 K/UL (ref 0–0.85)
MONOCYTES # BLD AUTO: 1.05 K/UL (ref 0–0.85)
MONOCYTES # BLD AUTO: 1.11 K/UL (ref 0–0.85)
MONOCYTES # BLD AUTO: 1.14 K/UL (ref 0–0.85)
MONOCYTES # BLD AUTO: 1.2 K/UL (ref 0–0.85)
MONOCYTES # BLD AUTO: 1.21 K/UL (ref 0–0.85)
MONOCYTES # BLD AUTO: 1.27 K/UL (ref 0–0.85)
MONOCYTES # BLD AUTO: 1.3 K/UL (ref 0–0.85)
MONOCYTES # BLD AUTO: 1.37 K/UL (ref 0–0.85)
MONOCYTES # BLD AUTO: 1.39 K/UL (ref 0–0.85)
MONOCYTES # BLD AUTO: 1.48 K/UL (ref 0–0.85)
MONOCYTES # BLD AUTO: 1.6 K/UL (ref 0–0.85)
MONOCYTES # BLD AUTO: 1.71 K/UL (ref 0–0.85)
MONOCYTES # BLD AUTO: 1.98 K/UL (ref 0–0.85)
MONOCYTES NFR BLD AUTO: 11 % (ref 0–13.4)
MONOCYTES NFR BLD AUTO: 5.1 % (ref 0–13.4)
MONOCYTES NFR BLD AUTO: 5.6 % (ref 0–13.4)
MONOCYTES NFR BLD AUTO: 5.6 % (ref 0–13.4)
MONOCYTES NFR BLD AUTO: 6.1 % (ref 0–13.4)
MONOCYTES NFR BLD AUTO: 7.1 % (ref 0–13.4)
MONOCYTES NFR BLD AUTO: 7.1 % (ref 0–13.4)
MONOCYTES NFR BLD AUTO: 7.5 % (ref 0–13.4)
MONOCYTES NFR BLD AUTO: 7.8 % (ref 0–13.4)
MONOCYTES NFR BLD AUTO: 7.9 % (ref 0–13.4)
MONOCYTES NFR BLD AUTO: 8.1 % (ref 0–13.4)
MONOCYTES NFR BLD AUTO: 8.3 % (ref 0–13.4)
MONOCYTES NFR BLD AUTO: 8.4 % (ref 0–13.4)
MONOCYTES NFR BLD AUTO: 8.6 % (ref 0–13.4)
MONOCYTES NFR BLD AUTO: 8.7 % (ref 0–13.4)
MONOCYTES NFR BLD AUTO: 8.7 % (ref 0–13.4)
MONOCYTES NFR BLD AUTO: 8.9 % (ref 0–13.4)
MONOCYTES NFR BLD AUTO: 9.1 % (ref 0–13.4)
MONOCYTES NFR BLD AUTO: 9.2 % (ref 0–13.4)
MONOCYTES NFR BLD AUTO: 9.8 % (ref 0–13.4)
MORPHOLOGY BLD-IMP: NORMAL
MUCOUS THREADS #/AREA URNS HPF: ABNORMAL /HPF
MUCOUS THREADS #/AREA URNS HPF: ABNORMAL /HPF
MYCOBACTERIUM SPEC CULT: NORMAL
NEUTROPHILS # BLD AUTO: 10.51 K/UL (ref 1.82–7.42)
NEUTROPHILS # BLD AUTO: 11.44 K/UL (ref 1.82–7.42)
NEUTROPHILS # BLD AUTO: 11.49 K/UL (ref 1.82–7.42)
NEUTROPHILS # BLD AUTO: 11.57 K/UL (ref 1.82–7.42)
NEUTROPHILS # BLD AUTO: 11.82 K/UL (ref 1.82–7.42)
NEUTROPHILS # BLD AUTO: 12.8 K/UL (ref 1.82–7.42)
NEUTROPHILS # BLD AUTO: 12.87 K/UL (ref 1.82–7.42)
NEUTROPHILS # BLD AUTO: 13.71 K/UL (ref 1.82–7.42)
NEUTROPHILS # BLD AUTO: 13.78 K/UL (ref 1.82–7.42)
NEUTROPHILS # BLD AUTO: 14.18 K/UL (ref 1.82–7.42)
NEUTROPHILS # BLD AUTO: 14.49 K/UL (ref 1.82–7.42)
NEUTROPHILS # BLD AUTO: 14.67 K/UL (ref 1.82–7.42)
NEUTROPHILS # BLD AUTO: 16.55 K/UL (ref 1.82–7.42)
NEUTROPHILS # BLD AUTO: 16.93 K/UL (ref 1.82–7.42)
NEUTROPHILS # BLD AUTO: 17.82 K/UL (ref 1.82–7.42)
NEUTROPHILS # BLD AUTO: 5.06 K/UL (ref 1.82–7.42)
NEUTROPHILS # BLD AUTO: 8.44 K/UL (ref 1.82–7.42)
NEUTROPHILS # BLD AUTO: 8.45 K/UL (ref 1.82–7.42)
NEUTROPHILS # BLD AUTO: 8.76 K/UL (ref 1.82–7.42)
NEUTROPHILS # BLD AUTO: 9.67 K/UL (ref 1.82–7.42)
NEUTROPHILS NFR BLD: 73.6 % (ref 44–72)
NEUTROPHILS NFR BLD: 76.1 % (ref 44–72)
NEUTROPHILS NFR BLD: 77.2 % (ref 44–72)
NEUTROPHILS NFR BLD: 77.4 % (ref 44–72)
NEUTROPHILS NFR BLD: 77.7 % (ref 44–72)
NEUTROPHILS NFR BLD: 78.1 % (ref 44–72)
NEUTROPHILS NFR BLD: 78.8 % (ref 44–72)
NEUTROPHILS NFR BLD: 79.1 % (ref 44–72)
NEUTROPHILS NFR BLD: 79.6 % (ref 44–72)
NEUTROPHILS NFR BLD: 79.8 % (ref 44–72)
NEUTROPHILS NFR BLD: 79.9 % (ref 44–72)
NEUTROPHILS NFR BLD: 80.6 % (ref 44–72)
NEUTROPHILS NFR BLD: 83.5 % (ref 44–72)
NEUTROPHILS NFR BLD: 84 % (ref 44–72)
NEUTROPHILS NFR BLD: 84.9 % (ref 44–72)
NEUTROPHILS NFR BLD: 86.2 % (ref 44–72)
NEUTROPHILS NFR BLD: 86.4 % (ref 44–72)
NEUTROPHILS NFR BLD: 87 % (ref 44–72)
NEUTROPHILS NFR BLD: 88.9 % (ref 44–72)
NEUTROPHILS NFR BLD: 89.8 % (ref 44–72)
NEUTS BAND NFR BLD MANUAL: 1.7 % (ref 0–10)
NITRITE UR QL STRIP.AUTO: NEGATIVE
NRBC # BLD AUTO: 0 K/UL
NRBC BLD-RTO: 0 /100 WBC (ref 0–0.2)
NSE SERPL IA-MCNC: 411.7 NG/ML
O2/TOTAL GAS SETTING VFR VENT: 100 %
O2/TOTAL GAS SETTING VFR VENT: 28 %
O2/TOTAL GAS SETTING VFR VENT: 28 %
O2/TOTAL GAS SETTING VFR VENT: 30 %
O2/TOTAL GAS SETTING VFR VENT: 40 %
O2/TOTAL GAS SETTING VFR VENT: 50 %
O2/TOTAL GAS SETTING VFR VENT: 60 %
O2/TOTAL GAS SETTING VFR VENT: 70 %
OPIATES UR QL SCN: NEGATIVE
OVALOCYTES BLD QL SMEAR: NORMAL
OXYCODONE UR QL SCN: NEGATIVE
PA AA BLD-ACNC: ABNORMAL % (ref 0–11)
PA ADP BLD-ACNC: ABNORMAL % (ref 0–17)
PCO2 BLDA: 123.3 MMHG (ref 26–37)
PCO2 BLDA: 26.7 MMHG (ref 26–37)
PCO2 BLDA: 27.4 MMHG (ref 26–37)
PCO2 BLDA: 28.4 MMHG (ref 26–37)
PCO2 BLDA: 29.6 MMHG (ref 26–37)
PCO2 BLDA: 30.8 MMHG (ref 26–37)
PCO2 BLDA: 35.6 MMHG (ref 26–37)
PCO2 BLDA: 35.9 MMHG (ref 26–37)
PCO2 BLDA: 36.9 MMHG (ref 26–37)
PCO2 BLDA: 37.2 MMHG (ref 26–37)
PCO2 BLDA: 37.2 MMHG (ref 26–37)
PCO2 BLDA: 37.4 MMHG (ref 26–37)
PCO2 BLDA: 38.4 MMHG (ref 26–37)
PCO2 BLDA: 39.2 MMHG (ref 26–37)
PCO2 BLDA: 39.4 MMHG (ref 26–37)
PCO2 BLDA: 40.6 MMHG (ref 26–37)
PCO2 BLDA: 40.6 MMHG (ref 26–37)
PCO2 BLDA: 41.4 MMHG (ref 26–37)
PCO2 BLDA: 42.1 MMHG (ref 26–37)
PCO2 BLDA: 42.2 MMHG (ref 26–37)
PCO2 BLDA: 44 MMHG (ref 26–37)
PCO2 BLDA: 44.6 MMHG (ref 26–37)
PCO2 BLDA: 46.6 MMHG (ref 26–37)
PCO2 BLDA: 47.8 MMHG (ref 26–37)
PCO2 BLDA: 60.9 MMHG (ref 26–37)
PCO2 TEMP ADJ BLDA: 117.2 MMHG (ref 26–37)
PCO2 TEMP ADJ BLDA: 25.7 MMHG (ref 26–37)
PCO2 TEMP ADJ BLDA: 27.7 MMHG (ref 26–37)
PCO2 TEMP ADJ BLDA: 28.1 MMHG (ref 26–37)
PCO2 TEMP ADJ BLDA: 29.8 MMHG (ref 26–37)
PCO2 TEMP ADJ BLDA: 30.2 MMHG (ref 26–37)
PCO2 TEMP ADJ BLDA: 35.9 MMHG (ref 26–37)
PCO2 TEMP ADJ BLDA: 36.7 MMHG (ref 26–37)
PCO2 TEMP ADJ BLDA: 36.9 MMHG (ref 26–37)
PCO2 TEMP ADJ BLDA: 37.1 MMHG (ref 26–37)
PCO2 TEMP ADJ BLDA: 37.7 MMHG (ref 26–37)
PCO2 TEMP ADJ BLDA: 38 MMHG (ref 26–37)
PCO2 TEMP ADJ BLDA: 39 MMHG (ref 26–37)
PCO2 TEMP ADJ BLDA: 39 MMHG (ref 26–37)
PCO2 TEMP ADJ BLDA: 39.1 MMHG (ref 26–37)
PCO2 TEMP ADJ BLDA: 40.5 MMHG (ref 26–37)
PCO2 TEMP ADJ BLDA: 40.6 MMHG (ref 26–37)
PCO2 TEMP ADJ BLDA: 40.7 MMHG (ref 26–37)
PCO2 TEMP ADJ BLDA: 42 MMHG (ref 26–37)
PCO2 TEMP ADJ BLDA: 44.4 MMHG (ref 26–37)
PCO2 TEMP ADJ BLDA: 44.8 MMHG (ref 26–37)
PCO2 TEMP ADJ BLDA: 45.8 MMHG (ref 26–37)
PCO2 TEMP ADJ BLDA: 48.7 MMHG (ref 26–37)
PCP UR QL SCN: NEGATIVE
PEEP END EXPIRATORY PRESSURE IPEEP: 10 CMH20
PEEP END EXPIRATORY PRESSURE IPEEP: 5 CMH20
PEEP END EXPIRATORY PRESSURE IPEEP: 8 CMH20
PH BLDA: 6.85 [PH] (ref 7.4–7.5)
PH BLDA: 7.02 [PH] (ref 7.4–7.5)
PH BLDA: 7.22 [PH] (ref 7.4–7.5)
PH BLDA: 7.3 [PH] (ref 7.4–7.5)
PH BLDA: 7.34 [PH] (ref 7.4–7.5)
PH BLDA: 7.36 [PH] (ref 7.4–7.5)
PH BLDA: 7.44 [PH] (ref 7.4–7.5)
PH BLDA: 7.46 [PH] (ref 7.4–7.5)
PH BLDA: 7.48 [PH] (ref 7.4–7.5)
PH BLDA: 7.48 [PH] (ref 7.4–7.5)
PH BLDA: 7.5 [PH] (ref 7.4–7.5)
PH BLDA: 7.51 [PH] (ref 7.4–7.5)
PH BLDA: 7.52 [PH] (ref 7.4–7.5)
PH BLDA: 7.54 [PH] (ref 7.4–7.5)
PH BLDA: 7.55 [PH] (ref 7.4–7.5)
PH BLDA: 7.55 [PH] (ref 7.4–7.5)
PH BLDA: 7.56 [PH] (ref 7.4–7.5)
PH BLDA: 7.56 [PH] (ref 7.4–7.5)
PH BLDA: 7.62 [PH] (ref 7.4–7.5)
PH BLDA: 7.66 [PH] (ref 7.4–7.5)
PH TEMP ADJ BLDA: 6.87 [PH] (ref 7.4–7.5)
PH TEMP ADJ BLDA: 7.24 [PH] (ref 7.4–7.5)
PH TEMP ADJ BLDA: 7.29 [PH] (ref 7.4–7.5)
PH TEMP ADJ BLDA: 7.35 [PH] (ref 7.4–7.5)
PH TEMP ADJ BLDA: 7.37 [PH] (ref 7.4–7.5)
PH TEMP ADJ BLDA: 7.43 [PH] (ref 7.4–7.5)
PH TEMP ADJ BLDA: 7.48 [PH] (ref 7.4–7.5)
PH TEMP ADJ BLDA: 7.49 [PH] (ref 7.4–7.5)
PH TEMP ADJ BLDA: 7.5 [PH] (ref 7.4–7.5)
PH TEMP ADJ BLDA: 7.51 [PH] (ref 7.4–7.5)
PH TEMP ADJ BLDA: 7.52 [PH] (ref 7.4–7.5)
PH TEMP ADJ BLDA: 7.53 [PH] (ref 7.4–7.5)
PH TEMP ADJ BLDA: 7.54 [PH] (ref 7.4–7.5)
PH TEMP ADJ BLDA: 7.55 [PH] (ref 7.4–7.5)
PH TEMP ADJ BLDA: 7.55 [PH] (ref 7.4–7.5)
PH TEMP ADJ BLDA: 7.57 [PH] (ref 7.4–7.5)
PH TEMP ADJ BLDA: 7.57 [PH] (ref 7.4–7.5)
PH TEMP ADJ BLDA: 7.62 [PH] (ref 7.4–7.5)
PH TEMP ADJ BLDA: 7.66 [PH] (ref 7.4–7.5)
PH UR STRIP.AUTO: 5.5 [PH] (ref 5–8)
PH UR STRIP.AUTO: 8 [PH] (ref 5–8)
PHOSPHATE SERPL-MCNC: 10.2 MG/DL (ref 2.5–4.5)
PHOSPHATE SERPL-MCNC: 10.4 MG/DL (ref 2.5–4.5)
PHOSPHATE SERPL-MCNC: 10.5 MG/DL (ref 2.5–4.5)
PHOSPHATE SERPL-MCNC: 10.7 MG/DL (ref 2.5–4.5)
PHOSPHATE SERPL-MCNC: 10.9 MG/DL (ref 2.5–4.5)
PHOSPHATE SERPL-MCNC: 10.9 MG/DL (ref 2.5–4.5)
PHOSPHATE SERPL-MCNC: 11.1 MG/DL (ref 2.5–4.5)
PHOSPHATE SERPL-MCNC: 11.2 MG/DL (ref 2.5–4.5)
PHOSPHATE SERPL-MCNC: 11.8 MG/DL (ref 2.5–4.5)
PHOSPHATE SERPL-MCNC: 12.5 MG/DL (ref 2.5–4.5)
PHOSPHATE SERPL-MCNC: 4 MG/DL (ref 2.5–4.5)
PHOSPHATE SERPL-MCNC: 4.3 MG/DL (ref 2.5–4.5)
PHOSPHATE SERPL-MCNC: 5.5 MG/DL (ref 2.5–4.5)
PHOSPHATE SERPL-MCNC: 7.3 MG/DL (ref 2.5–4.5)
PHOSPHATE SERPL-MCNC: 8.2 MG/DL (ref 2.5–4.5)
PHOSPHATE SERPL-MCNC: 9.1 MG/DL (ref 2.5–4.5)
PHOSPHATE SERPL-MCNC: 9.3 MG/DL (ref 2.5–4.5)
PHOSPHATE SERPL-MCNC: 9.8 MG/DL (ref 2.5–4.5)
PLATELET # BLD AUTO: 107 K/UL (ref 164–446)
PLATELET # BLD AUTO: 108 K/UL (ref 164–446)
PLATELET # BLD AUTO: 113 K/UL (ref 164–446)
PLATELET # BLD AUTO: 115 K/UL (ref 164–446)
PLATELET # BLD AUTO: 115 K/UL (ref 164–446)
PLATELET # BLD AUTO: 118 K/UL (ref 164–446)
PLATELET # BLD AUTO: 124 K/UL (ref 164–446)
PLATELET # BLD AUTO: 124 K/UL (ref 164–446)
PLATELET # BLD AUTO: 140 K/UL (ref 164–446)
PLATELET # BLD AUTO: 140 K/UL (ref 164–446)
PLATELET # BLD AUTO: 142 K/UL (ref 164–446)
PLATELET # BLD AUTO: 144 K/UL (ref 164–446)
PLATELET # BLD AUTO: 151 K/UL (ref 164–446)
PLATELET # BLD AUTO: 157 K/UL (ref 164–446)
PLATELET # BLD AUTO: 166 K/UL (ref 164–446)
PLATELET # BLD AUTO: 184 K/UL (ref 164–446)
PLATELET # BLD AUTO: 185 K/UL (ref 164–446)
PLATELET # BLD AUTO: 206 K/UL (ref 164–446)
PLATELET # BLD AUTO: 222 K/UL (ref 164–446)
PLATELET # BLD AUTO: 259 K/UL (ref 164–446)
PLATELET BLD QL SMEAR: NORMAL
PMV BLD AUTO: 10.6 FL (ref 9–12.9)
PMV BLD AUTO: 10.8 FL (ref 9–12.9)
PMV BLD AUTO: 10.9 FL (ref 9–12.9)
PMV BLD AUTO: 11.1 FL (ref 9–12.9)
PMV BLD AUTO: 11.2 FL (ref 9–12.9)
PMV BLD AUTO: 11.3 FL (ref 9–12.9)
PMV BLD AUTO: 11.4 FL (ref 9–12.9)
PMV BLD AUTO: 11.5 FL (ref 9–12.9)
PMV BLD AUTO: 11.7 FL (ref 9–12.9)
PMV BLD AUTO: 11.7 FL (ref 9–12.9)
PMV BLD AUTO: 11.8 FL (ref 9–12.9)
PMV BLD AUTO: 11.8 FL (ref 9–12.9)
PMV BLD AUTO: 11.9 FL (ref 9–12.9)
PMV BLD AUTO: 12.1 FL (ref 9–12.9)
PMV BLD AUTO: 12.3 FL (ref 9–12.9)
PMV BLD AUTO: 12.4 FL (ref 9–12.9)
PMV BLD AUTO: 12.4 FL (ref 9–12.9)
PMV BLD AUTO: 9.7 FL (ref 9–12.9)
PO2 BLDA: 100 MMHG (ref 64–87)
PO2 BLDA: 106 MMHG (ref 64–87)
PO2 BLDA: 112 MMHG (ref 64–87)
PO2 BLDA: 132 MMHG (ref 64–87)
PO2 BLDA: 159 MMHG (ref 64–87)
PO2 BLDA: 242 MMHG (ref 64–87)
PO2 BLDA: 320 MMHG (ref 64–87)
PO2 BLDA: 339.1 MMHG (ref 64–87)
PO2 BLDA: 344 MMHG (ref 64–87)
PO2 BLDA: 382 MMHG (ref 64–87)
PO2 BLDA: 404 MMHG (ref 64–87)
PO2 BLDA: 421 MMHG (ref 64–87)
PO2 BLDA: 422 MMHG (ref 64–87)
PO2 BLDA: 63 MMHG (ref 64–87)
PO2 BLDA: 74 MMHG (ref 64–87)
PO2 BLDA: 79 MMHG (ref 64–87)
PO2 BLDA: 82.1 MMHG (ref 64–87)
PO2 BLDA: 84 MMHG (ref 64–87)
PO2 BLDA: 89.3 MMHG (ref 64–87)
PO2 BLDA: 93 MMHG (ref 64–87)
PO2 BLDA: 94 MMHG (ref 64–87)
PO2 BLDA: 97 MMHG (ref 64–87)
PO2 BLDA: 97 MMHG (ref 64–87)
PO2 BLDA: 98 MMHG (ref 64–87)
PO2 BLDA: 99 MMHG (ref 64–87)
PO2 TEMP ADJ BLDA: 100 MMHG (ref 64–87)
PO2 TEMP ADJ BLDA: 111 MMHG (ref 64–87)
PO2 TEMP ADJ BLDA: 135 MMHG (ref 64–87)
PO2 TEMP ADJ BLDA: 156 MMHG (ref 64–87)
PO2 TEMP ADJ BLDA: 246 MMHG (ref 64–87)
PO2 TEMP ADJ BLDA: 321 MMHG (ref 64–87)
PO2 TEMP ADJ BLDA: 338.6 MMHG (ref 64–87)
PO2 TEMP ADJ BLDA: 343 MMHG (ref 64–87)
PO2 TEMP ADJ BLDA: 382 MMHG (ref 64–87)
PO2 TEMP ADJ BLDA: 404 MMHG (ref 64–87)
PO2 TEMP ADJ BLDA: 417 MMHG (ref 64–87)
PO2 TEMP ADJ BLDA: 418 MMHG (ref 64–87)
PO2 TEMP ADJ BLDA: 58 MMHG (ref 64–87)
PO2 TEMP ADJ BLDA: 76 MMHG (ref 64–87)
PO2 TEMP ADJ BLDA: 81 MMHG (ref 64–87)
PO2 TEMP ADJ BLDA: 81 MMHG (ref 64–87)
PO2 TEMP ADJ BLDA: 83 MMHG (ref 64–87)
PO2 TEMP ADJ BLDA: 89 MMHG (ref 64–87)
PO2 TEMP ADJ BLDA: 92 MMHG (ref 64–87)
PO2 TEMP ADJ BLDA: 93 MMHG (ref 64–87)
PO2 TEMP ADJ BLDA: 93 MMHG (ref 64–87)
PO2 TEMP ADJ BLDA: 95 MMHG (ref 64–87)
PO2 TEMP ADJ BLDA: 98 MMHG (ref 64–87)
POIKILOCYTOSIS BLD QL SMEAR: NORMAL
POTASSIUM BLD-SCNC: 4.8 MMOL/L (ref 3.6–5.5)
POTASSIUM BLD-SCNC: 5.1 MMOL/L (ref 3.6–5.5)
POTASSIUM BLD-SCNC: 6.1 MMOL/L (ref 3.6–5.5)
POTASSIUM SERPL-SCNC: 3.5 MMOL/L (ref 3.6–5.5)
POTASSIUM SERPL-SCNC: 3.6 MMOL/L (ref 3.6–5.5)
POTASSIUM SERPL-SCNC: 3.7 MMOL/L (ref 3.6–5.5)
POTASSIUM SERPL-SCNC: 3.8 MMOL/L (ref 3.6–5.5)
POTASSIUM SERPL-SCNC: 3.8 MMOL/L (ref 3.6–5.5)
POTASSIUM SERPL-SCNC: 3.9 MMOL/L (ref 3.6–5.5)
POTASSIUM SERPL-SCNC: 4 MMOL/L (ref 3.6–5.5)
POTASSIUM SERPL-SCNC: 4.1 MMOL/L (ref 3.6–5.5)
POTASSIUM SERPL-SCNC: 4.2 MMOL/L (ref 3.6–5.5)
POTASSIUM SERPL-SCNC: 4.6 MMOL/L (ref 3.6–5.5)
POTASSIUM SERPL-SCNC: 4.8 MMOL/L (ref 3.6–5.5)
POTASSIUM SERPL-SCNC: 4.9 MMOL/L (ref 3.6–5.5)
POTASSIUM SERPL-SCNC: 4.9 MMOL/L (ref 3.6–5.5)
POTASSIUM SERPL-SCNC: 5 MMOL/L (ref 3.6–5.5)
POTASSIUM SERPL-SCNC: 5.1 MMOL/L (ref 3.6–5.5)
POTASSIUM SERPL-SCNC: 5.4 MMOL/L (ref 3.6–5.5)
POTASSIUM SERPL-SCNC: 5.7 MMOL/L (ref 3.6–5.5)
POTASSIUM SERPL-SCNC: 5.8 MMOL/L (ref 3.6–5.5)
POTASSIUM SERPL-SCNC: 5.8 MMOL/L (ref 3.6–5.5)
POTASSIUM SERPL-SCNC: 6.2 MMOL/L (ref 3.6–5.5)
POTASSIUM SERPL-SCNC: 6.4 MMOL/L (ref 3.6–5.5)
POTASSIUM SERPL-SCNC: 6.6 MMOL/L (ref 3.6–5.5)
POTASSIUM SERPL-SCNC: 6.9 MMOL/L (ref 3.6–5.5)
POTASSIUM SERPL-SCNC: 6.9 MMOL/L (ref 3.6–5.5)
POTASSIUM SERPL-SCNC: 7.2 MMOL/L (ref 3.6–5.5)
POTASSIUM SERPL-SCNC: 7.4 MMOL/L (ref 3.6–5.5)
PREALB SERPL-MCNC: 12.3 MG/DL (ref 18–38)
PREALB SERPL-MCNC: 12.7 MG/DL (ref 18–38)
PREALB SERPL-MCNC: 22.8 MG/DL (ref 18–38)
PROCALCITONIN SERPL-MCNC: 26.8 NG/ML
PRODUCT TYPE UPROD: NORMAL
PROPOXYPH UR QL SCN: NEGATIVE
PROT SERPL-MCNC: 4.6 G/DL (ref 6–8.2)
PROT SERPL-MCNC: 4.6 G/DL (ref 6–8.2)
PROT SERPL-MCNC: 5.2 G/DL (ref 6–8.2)
PROT SERPL-MCNC: 5.3 G/DL (ref 6–8.2)
PROT SERPL-MCNC: 5.6 G/DL (ref 6–8.2)
PROT SERPL-MCNC: 5.7 G/DL (ref 6–8.2)
PROT SERPL-MCNC: 5.7 G/DL (ref 6–8.2)
PROT SERPL-MCNC: 5.9 G/DL (ref 6–8.2)
PROT SERPL-MCNC: 6 G/DL (ref 6–8.2)
PROT SERPL-MCNC: 6 G/DL (ref 6–8.2)
PROT SERPL-MCNC: 6.2 G/DL (ref 6–8.2)
PROT SERPL-MCNC: 6.6 G/DL (ref 6–8.2)
PROT SERPL-MCNC: 6.9 G/DL (ref 6–8.2)
PROT UR QL STRIP: 30 MG/DL
PROT UR QL STRIP: 300 MG/DL
PROTHROMBIN TIME: 13.1 SEC (ref 12–14.6)
PROTHROMBIN TIME: 13.2 SEC (ref 12–14.6)
PROTHROMBIN TIME: 13.3 SEC (ref 12–14.6)
PROTHROMBIN TIME: 13.4 SEC (ref 12–14.6)
PROTHROMBIN TIME: 13.5 SEC (ref 12–14.6)
PROTHROMBIN TIME: 13.6 SEC (ref 12–14.6)
PROTHROMBIN TIME: 13.7 SEC (ref 12–14.6)
PROTHROMBIN TIME: 14.5 SEC (ref 12–14.6)
PROTHROMBIN TIME: 14.6 SEC (ref 12–14.6)
PROTHROMBIN TIME: 19.7 SEC (ref 12–14.6)
QFT TB2 - NIL TBQ2: 0 IU/ML
RBC # BLD AUTO: 2.38 M/UL (ref 4.7–6.1)
RBC # BLD AUTO: 2.57 M/UL (ref 4.7–6.1)
RBC # BLD AUTO: 3.01 M/UL (ref 4.7–6.1)
RBC # BLD AUTO: 3.11 M/UL (ref 4.7–6.1)
RBC # BLD AUTO: 3.15 M/UL (ref 4.7–6.1)
RBC # BLD AUTO: 3.17 M/UL (ref 4.7–6.1)
RBC # BLD AUTO: 3.24 M/UL (ref 4.7–6.1)
RBC # BLD AUTO: 3.38 M/UL (ref 4.7–6.1)
RBC # BLD AUTO: 3.38 M/UL (ref 4.7–6.1)
RBC # BLD AUTO: 3.4 M/UL (ref 4.7–6.1)
RBC # BLD AUTO: 3.43 M/UL (ref 4.7–6.1)
RBC # BLD AUTO: 3.43 M/UL (ref 4.7–6.1)
RBC # BLD AUTO: 3.48 M/UL (ref 4.7–6.1)
RBC # BLD AUTO: 3.78 M/UL (ref 4.7–6.1)
RBC # BLD AUTO: 3.8 M/UL (ref 4.7–6.1)
RBC # BLD AUTO: 3.83 M/UL (ref 4.7–6.1)
RBC # BLD AUTO: 4.32 M/UL (ref 4.7–6.1)
RBC # BLD AUTO: 4.48 M/UL (ref 4.7–6.1)
RBC # BLD AUTO: 4.7 M/UL (ref 4.7–6.1)
RBC # BLD AUTO: 5.33 M/UL (ref 4.7–6.1)
RBC # URNS HPF: ABNORMAL /HPF
RBC BLD AUTO: PRESENT
RBC UR QL AUTO: ABNORMAL
RENAL EPI CELLS #/AREA URNS HPF: ABNORMAL /HPF
RH BLD: NORMAL
RH BLD: NORMAL
RHODAMINE-AURAMINE STN SPEC: NORMAL
RHODAMINE-AURAMINE STN SPEC: NORMAL
RSV RNA SPEC QL NAA+PROBE: NEGATIVE
RSV RNA SPEC QL NAA+PROBE: NEGATIVE
SALICYLATES SERPL-MCNC: 1 MG/DL (ref 15–25)
SAO2 % BLDA: 100 % (ref 93–99)
SAO2 % BLDA: 67 % (ref 93–99)
SAO2 % BLDA: 94 % (ref 93–99)
SAO2 % BLDA: 95.2 % (ref 93–99)
SAO2 % BLDA: 95.3 % (ref 93–99)
SAO2 % BLDA: 96 % (ref 93–99)
SAO2 % BLDA: 96 % (ref 93–99)
SAO2 % BLDA: 97 % (ref 93–99)
SAO2 % BLDA: 98 % (ref 93–99)
SAO2 % BLDA: 99 % (ref 93–99)
SAO2 % BLDA: 99.2 % (ref 93–99)
SARS-COV-2 RNA RESP QL NAA+PROBE: NOTDETECTED
SARS-COV-2 RNA RESP QL NAA+PROBE: NOTDETECTED
SCCMEC + MECA PNL NOSE NAA+PROBE: NEGATIVE
SIGNIFICANT IND 70042: NORMAL
SITE SITE: NORMAL
SMUDGE CELLS BLD QL SMEAR: NORMAL
SODIUM BLD-SCNC: 131 MMOL/L (ref 135–145)
SODIUM BLD-SCNC: 131 MMOL/L (ref 135–145)
SODIUM BLD-SCNC: 139 MMOL/L (ref 135–145)
SODIUM SERPL-SCNC: 132 MMOL/L (ref 135–145)
SODIUM SERPL-SCNC: 132 MMOL/L (ref 135–145)
SODIUM SERPL-SCNC: 133 MMOL/L (ref 135–145)
SODIUM SERPL-SCNC: 134 MMOL/L (ref 135–145)
SODIUM SERPL-SCNC: 135 MMOL/L (ref 135–145)
SODIUM SERPL-SCNC: 136 MMOL/L (ref 135–145)
SODIUM SERPL-SCNC: 137 MMOL/L (ref 135–145)
SODIUM SERPL-SCNC: 138 MMOL/L (ref 135–145)
SODIUM SERPL-SCNC: 138 MMOL/L (ref 135–145)
SODIUM SERPL-SCNC: 139 MMOL/L (ref 135–145)
SODIUM SERPL-SCNC: 140 MMOL/L (ref 135–145)
SODIUM SERPL-SCNC: 143 MMOL/L (ref 135–145)
SOURCE SOURCE: NORMAL
SP GR UR STRIP.AUTO: 1.01
SP GR UR STRIP.AUTO: 1.02
SPECIMEN DRAWN FROM PATIENT: ABNORMAL
SPECIMEN SOURCE: NORMAL
SPECIMEN SOURCE: NORMAL
SPERM #/AREA URNS HPF: ABNORMAL /HPF
TEG ALGORITHM TGALG: ABNORMAL
TIDAL VOLUME IVT: 400 ML
TIDAL VOLUME IVT: 460 ML
TIDAL VOLUME IVT: 460 ML
TIDAL VOLUME IVT: 570 ML
TRANS CELLS #/AREA URNS HPF: ABNORMAL /HPF
TRIGL SERPL-MCNC: 66 MG/DL (ref 0–149)
TROPONIN T SERPL-MCNC: 379 NG/L (ref 6–19)
TROPONIN T SERPL-MCNC: 394 NG/L (ref 6–19)
TROPONIN T SERPL-MCNC: 427 NG/L (ref 6–19)
TROPONIN T SERPL-MCNC: 469 NG/L (ref 6–19)
TROPONIN T SERPL-MCNC: 513 NG/L (ref 6–19)
TROPONIN T SERPL-MCNC: 530 NG/L (ref 6–19)
TROPONIN T SERPL-MCNC: 586 NG/L (ref 6–19)
TROPONIN T SERPL-MCNC: 609 NG/L (ref 6–19)
TROPONIN T SERPL-MCNC: 630 NG/L (ref 6–19)
TROPONIN T SERPL-MCNC: 634 NG/L (ref 6–19)
TROPONIN T SERPL-MCNC: 637 NG/L (ref 6–19)
TROPONIN T SERPL-MCNC: 646 NG/L (ref 6–19)
TROPONIN T SERPL-MCNC: 664 NG/L (ref 6–19)
TROPONIN T SERPL-MCNC: 772 NG/L (ref 6–19)
TROPONIN T SERPL-MCNC: 787 NG/L (ref 6–19)
TROPONIN T SERPL-MCNC: 89 NG/L (ref 6–19)
UNIT STATUS USTAT: NORMAL
UROBILINOGEN UR STRIP.AUTO-MCNC: 0.2 MG/DL
WBC # BLD AUTO: 10.7 K/UL (ref 4.8–10.8)
WBC # BLD AUTO: 10.9 K/UL (ref 4.8–10.8)
WBC # BLD AUTO: 11 K/UL (ref 4.8–10.8)
WBC # BLD AUTO: 12.5 K/UL (ref 4.8–10.8)
WBC # BLD AUTO: 12.6 K/UL (ref 4.8–10.8)
WBC # BLD AUTO: 14.5 K/UL (ref 4.8–10.8)
WBC # BLD AUTO: 14.5 K/UL (ref 4.8–10.8)
WBC # BLD AUTO: 14.7 K/UL (ref 4.8–10.8)
WBC # BLD AUTO: 15.5 K/UL (ref 4.8–10.8)
WBC # BLD AUTO: 15.9 K/UL (ref 4.8–10.8)
WBC # BLD AUTO: 16.1 K/UL (ref 4.8–10.8)
WBC # BLD AUTO: 16.3 K/UL (ref 4.8–10.8)
WBC # BLD AUTO: 16.4 K/UL (ref 4.8–10.8)
WBC # BLD AUTO: 16.4 K/UL (ref 4.8–10.8)
WBC # BLD AUTO: 17 K/UL (ref 4.8–10.8)
WBC # BLD AUTO: 17.1 K/UL (ref 4.8–10.8)
WBC # BLD AUTO: 18.5 K/UL (ref 4.8–10.8)
WBC # BLD AUTO: 20 K/UL (ref 4.8–10.8)
WBC # BLD AUTO: 21.7 K/UL (ref 4.8–10.8)
WBC # BLD AUTO: 6.5 K/UL (ref 4.8–10.8)
WBC #/AREA URNS HPF: ABNORMAL /HPF

## 2024-01-01 PROCEDURE — 37799 UNLISTED PX VASCULAR SURGERY: CPT

## 2024-01-01 PROCEDURE — 83605 ASSAY OF LACTIC ACID: CPT | Mod: 91

## 2024-01-01 PROCEDURE — 700105 HCHG RX REV CODE 258: Performed by: INTERNAL MEDICINE

## 2024-01-01 PROCEDURE — 81001 URINALYSIS AUTO W/SCOPE: CPT

## 2024-01-01 PROCEDURE — 700101 HCHG RX REV CODE 250: Performed by: EMERGENCY MEDICINE

## 2024-01-01 PROCEDURE — 94799 UNLISTED PULMONARY SVC/PX: CPT

## 2024-01-01 PROCEDURE — 84484 ASSAY OF TROPONIN QUANT: CPT

## 2024-01-01 PROCEDURE — 700102 HCHG RX REV CODE 250 W/ 637 OVERRIDE(OP): Performed by: INTERNAL MEDICINE

## 2024-01-01 PROCEDURE — 700117 HCHG RX CONTRAST REV CODE 255: Performed by: INTERNAL MEDICINE

## 2024-01-01 PROCEDURE — 85347 COAGULATION TIME ACTIVATED: CPT

## 2024-01-01 PROCEDURE — 85025 COMPLETE CBC W/AUTO DIFF WBC: CPT

## 2024-01-01 PROCEDURE — 84484 ASSAY OF TROPONIN QUANT: CPT | Mod: 91

## 2024-01-01 PROCEDURE — 80048 BASIC METABOLIC PNL TOTAL CA: CPT

## 2024-01-01 PROCEDURE — 36556 INSERT NON-TUNNEL CV CATH: CPT | Performed by: EMERGENCY MEDICINE

## 2024-01-01 PROCEDURE — 700102 HCHG RX REV CODE 250 W/ 637 OVERRIDE(OP): Performed by: STUDENT IN AN ORGANIZED HEALTH CARE EDUCATION/TRAINING PROGRAM

## 2024-01-01 PROCEDURE — A9270 NON-COVERED ITEM OR SERVICE: HCPCS | Performed by: INTERNAL MEDICINE

## 2024-01-01 PROCEDURE — 95822 EEG COMA OR SLEEP ONLY: CPT | Performed by: STUDENT IN AN ORGANIZED HEALTH CARE EDUCATION/TRAINING PROGRAM

## 2024-01-01 PROCEDURE — 80048 BASIC METABOLIC PNL TOTAL CA: CPT | Mod: 91

## 2024-01-01 PROCEDURE — 70450 CT HEAD/BRAIN W/O DYE: CPT

## 2024-01-01 PROCEDURE — 700111 HCHG RX REV CODE 636 W/ 250 OVERRIDE (IP): Performed by: INTERNAL MEDICINE

## 2024-01-01 PROCEDURE — 770022 HCHG ROOM/CARE - ICU (200)

## 2024-01-01 PROCEDURE — 85610 PROTHROMBIN TIME: CPT | Mod: 91

## 2024-01-01 PROCEDURE — 85025 COMPLETE CBC W/AUTO DIFF WBC: CPT | Mod: 91

## 2024-01-01 PROCEDURE — 302977 HCHG BRONCHOSCOPY PROC-THERAPEUTIC

## 2024-01-01 PROCEDURE — 0BH17EZ INSERTION OF ENDOTRACHEAL AIRWAY INTO TRACHEA, VIA NATURAL OR ARTIFICIAL OPENING: ICD-10-PCS | Performed by: EMERGENCY MEDICINE

## 2024-01-01 PROCEDURE — 700111 HCHG RX REV CODE 636 W/ 250 OVERRIDE (IP): Performed by: EMERGENCY MEDICINE

## 2024-01-01 PROCEDURE — 700105 HCHG RX REV CODE 258: Performed by: EMERGENCY MEDICINE

## 2024-01-01 PROCEDURE — 36620 INSERTION CATHETER ARTERY: CPT | Performed by: INTERNAL MEDICINE

## 2024-01-01 PROCEDURE — 82803 BLOOD GASES ANY COMBINATION: CPT | Mod: 91

## 2024-01-01 PROCEDURE — 82962 GLUCOSE BLOOD TEST: CPT | Mod: 91

## 2024-01-01 PROCEDURE — 83735 ASSAY OF MAGNESIUM: CPT | Mod: 91

## 2024-01-01 PROCEDURE — 700101 HCHG RX REV CODE 250: Performed by: INTERNAL MEDICINE

## 2024-01-01 PROCEDURE — 86900 BLOOD TYPING SEROLOGIC ABO: CPT

## 2024-01-01 PROCEDURE — 99291 CRITICAL CARE FIRST HOUR: CPT | Mod: 25 | Performed by: INTERNAL MEDICINE

## 2024-01-01 PROCEDURE — 71045 X-RAY EXAM CHEST 1 VIEW: CPT

## 2024-01-01 PROCEDURE — 86850 RBC ANTIBODY SCREEN: CPT

## 2024-01-01 PROCEDURE — 94003 VENT MGMT INPAT SUBQ DAY: CPT

## 2024-01-01 PROCEDURE — 99292 CRITICAL CARE ADDL 30 MIN: CPT | Mod: 25 | Performed by: INTERNAL MEDICINE

## 2024-01-01 PROCEDURE — 99233 SBSQ HOSP IP/OBS HIGH 50: CPT | Performed by: INTERNAL MEDICINE

## 2024-01-01 PROCEDURE — 80143 DRUG ASSAY ACETAMINOPHEN: CPT

## 2024-01-01 PROCEDURE — 80053 COMPREHEN METABOLIC PANEL: CPT | Mod: 91

## 2024-01-01 PROCEDURE — 82803 BLOOD GASES ANY COMBINATION: CPT

## 2024-01-01 PROCEDURE — 31646 BRNCHSC W/THER ASPIR SBSQ: CPT | Mod: 51 | Performed by: INTERNAL MEDICINE

## 2024-01-01 PROCEDURE — 99152 MOD SED SAME PHYS/QHP 5/>YRS: CPT

## 2024-01-01 PROCEDURE — 84100 ASSAY OF PHOSPHORUS: CPT

## 2024-01-01 PROCEDURE — 82977 ASSAY OF GGT: CPT

## 2024-01-01 PROCEDURE — 82248 BILIRUBIN DIRECT: CPT

## 2024-01-01 PROCEDURE — 82962 GLUCOSE BLOOD TEST: CPT

## 2024-01-01 PROCEDURE — 700111 HCHG RX REV CODE 636 W/ 250 OVERRIDE (IP): Performed by: STUDENT IN AN ORGANIZED HEALTH CARE EDUCATION/TRAINING PROGRAM

## 2024-01-01 PROCEDURE — 96365 THER/PROPH/DIAG IV INF INIT: CPT

## 2024-01-01 PROCEDURE — 93306 TTE W/DOPPLER COMPLETE: CPT

## 2024-01-01 PROCEDURE — 84132 ASSAY OF SERUM POTASSIUM: CPT

## 2024-01-01 PROCEDURE — 85007 BL SMEAR W/DIFF WBC COUNT: CPT

## 2024-01-01 PROCEDURE — 85610 PROTHROMBIN TIME: CPT

## 2024-01-01 PROCEDURE — 82550 ASSAY OF CK (CPK): CPT

## 2024-01-01 PROCEDURE — 82552 ASSAY OF CPK IN BLOOD: CPT | Mod: 91

## 2024-01-01 PROCEDURE — 99291 CRITICAL CARE FIRST HOUR: CPT | Mod: 25 | Performed by: EMERGENCY MEDICINE

## 2024-01-01 PROCEDURE — 99291 CRITICAL CARE FIRST HOUR: CPT | Performed by: INTERNAL MEDICINE

## 2024-01-01 PROCEDURE — 93306 TTE W/DOPPLER COMPLETE: CPT | Mod: 26 | Performed by: INTERNAL MEDICINE

## 2024-01-01 PROCEDURE — 70551 MRI BRAIN STEM W/O DYE: CPT

## 2024-01-01 PROCEDURE — C1751 CATH, INF, PER/CENT/MIDLINE: HCPCS

## 2024-01-01 PROCEDURE — 700105 HCHG RX REV CODE 258: Performed by: STUDENT IN AN ORGANIZED HEALTH CARE EDUCATION/TRAINING PROGRAM

## 2024-01-01 PROCEDURE — 80053 COMPREHEN METABOLIC PANEL: CPT

## 2024-01-01 PROCEDURE — 31500 INSERT EMERGENCY AIRWAY: CPT

## 2024-01-01 PROCEDURE — P9047 ALBUMIN (HUMAN), 25%, 50ML: HCPCS | Mod: JZ | Performed by: INTERNAL MEDICINE

## 2024-01-01 PROCEDURE — 87205 SMEAR GRAM STAIN: CPT

## 2024-01-01 PROCEDURE — 87070 CULTURE OTHR SPECIMN AEROBIC: CPT

## 2024-01-01 PROCEDURE — 82077 ASSAY SPEC XCP UR&BREATH IA: CPT

## 2024-01-01 PROCEDURE — 31646 BRNCHSC W/THER ASPIR SBSQ: CPT | Performed by: INTERNAL MEDICINE

## 2024-01-01 PROCEDURE — 86140 C-REACTIVE PROTEIN: CPT

## 2024-01-01 PROCEDURE — 92950 HEART/LUNG RESUSCITATION CPR: CPT

## 2024-01-01 PROCEDURE — 80307 DRUG TEST PRSMV CHEM ANLYZR: CPT

## 2024-01-01 PROCEDURE — 0241U HCHG SARS-COV-2 COVID-19 NFCT DS RESP RNA 4 TRGT MIC: CPT

## 2024-01-01 PROCEDURE — 84134 ASSAY OF PREALBUMIN: CPT

## 2024-01-01 PROCEDURE — 99291 CRITICAL CARE FIRST HOUR: CPT

## 2024-01-01 PROCEDURE — 700111 HCHG RX REV CODE 636 W/ 250 OVERRIDE (IP)

## 2024-01-01 PROCEDURE — 700111 HCHG RX REV CODE 636 W/ 250 OVERRIDE (IP): Mod: JZ

## 2024-01-01 PROCEDURE — 88112 CYTOPATH CELL ENHANCE TECH: CPT

## 2024-01-01 PROCEDURE — 87086 URINE CULTURE/COLONY COUNT: CPT

## 2024-01-01 PROCEDURE — 700111 HCHG RX REV CODE 636 W/ 250 OVERRIDE (IP): Mod: JZ | Performed by: INTERNAL MEDICINE

## 2024-01-01 PROCEDURE — 99292 CRITICAL CARE ADDL 30 MIN: CPT | Performed by: INTERNAL MEDICINE

## 2024-01-01 PROCEDURE — 700117 HCHG RX CONTRAST REV CODE 255: Performed by: EMERGENCY MEDICINE

## 2024-01-01 PROCEDURE — 31624 DX BRONCHOSCOPE/LAVAGE: CPT | Performed by: INTERNAL MEDICINE

## 2024-01-01 PROCEDURE — 36556 INSERT NON-TUNNEL CV CATH: CPT

## 2024-01-01 PROCEDURE — 86901 BLOOD TYPING SEROLOGIC RH(D): CPT

## 2024-01-01 PROCEDURE — 84145 PROCALCITONIN (PCT): CPT

## 2024-01-01 PROCEDURE — 90935 HEMODIALYSIS ONE EVALUATION: CPT

## 2024-01-01 PROCEDURE — 304538 HCHG NG TUBE

## 2024-01-01 PROCEDURE — 87102 FUNGUS ISOLATION CULTURE: CPT

## 2024-01-01 PROCEDURE — 86923 COMPATIBILITY TEST ELECTRIC: CPT

## 2024-01-01 PROCEDURE — 83605 ASSAY OF LACTIC ACID: CPT

## 2024-01-01 PROCEDURE — 85730 THROMBOPLASTIN TIME PARTIAL: CPT

## 2024-01-01 PROCEDURE — 700102 HCHG RX REV CODE 250 W/ 637 OVERRIDE(OP)

## 2024-01-01 PROCEDURE — 82550 ASSAY OF CK (CPK): CPT | Mod: 91

## 2024-01-01 PROCEDURE — 5A2204Z RESTORATION OF CARDIAC RHYTHM, SINGLE: ICD-10-PCS | Performed by: EMERGENCY MEDICINE

## 2024-01-01 PROCEDURE — 86316 IMMUNOASSAY TUMOR OTHER: CPT

## 2024-01-01 PROCEDURE — 82140 ASSAY OF AMMONIA: CPT

## 2024-01-01 PROCEDURE — 700105 HCHG RX REV CODE 258

## 2024-01-01 PROCEDURE — 71275 CT ANGIOGRAPHY CHEST: CPT

## 2024-01-01 PROCEDURE — 88305 TISSUE EXAM BY PATHOLOGIST: CPT

## 2024-01-01 PROCEDURE — 84100 ASSAY OF PHOSPHORUS: CPT | Mod: 91

## 2024-01-01 PROCEDURE — 85027 COMPLETE CBC AUTOMATED: CPT

## 2024-01-01 PROCEDURE — P9016 RBC LEUKOCYTES REDUCED: HCPCS

## 2024-01-01 PROCEDURE — 36415 COLL VENOUS BLD VENIPUNCTURE: CPT

## 2024-01-01 PROCEDURE — 80179 DRUG ASSAY SALICYLATE: CPT

## 2024-01-01 PROCEDURE — 82330 ASSAY OF CALCIUM: CPT

## 2024-01-01 PROCEDURE — 83735 ASSAY OF MAGNESIUM: CPT

## 2024-01-01 PROCEDURE — 4A133B1 MONITORING OF ARTERIAL PRESSURE, PERIPHERAL, PERCUTANEOUS APPROACH: ICD-10-PCS | Performed by: INTERNAL MEDICINE

## 2024-01-01 PROCEDURE — 87205 SMEAR GRAM STAIN: CPT | Mod: 91

## 2024-01-01 PROCEDURE — 99497 ADVNCD CARE PLAN 30 MIN: CPT | Performed by: NURSE PRACTITIONER

## 2024-01-01 PROCEDURE — 82977 ASSAY OF GGT: CPT | Mod: 91

## 2024-01-01 PROCEDURE — 88331 PATH CONSLTJ SURG 1 BLK 1SPC: CPT | Mod: 91

## 2024-01-01 PROCEDURE — 303105 HCHG CATHETER EXTRA

## 2024-01-01 PROCEDURE — 82248 BILIRUBIN DIRECT: CPT | Mod: 91

## 2024-01-01 PROCEDURE — 05HM33Z INSERTION OF INFUSION DEVICE INTO RIGHT INTERNAL JUGULAR VEIN, PERCUTANEOUS APPROACH: ICD-10-PCS | Performed by: EMERGENCY MEDICINE

## 2024-01-01 PROCEDURE — A9270 NON-COVERED ITEM OR SERVICE: HCPCS

## 2024-01-01 PROCEDURE — 86480 TB TEST CELL IMMUN MEASURE: CPT

## 2024-01-01 PROCEDURE — 87040 BLOOD CULTURE FOR BACTERIA: CPT | Mod: 91

## 2024-01-01 PROCEDURE — 36600 WITHDRAWAL OF ARTERIAL BLOOD: CPT

## 2024-01-01 PROCEDURE — 302978 HCHG BRONCHOSCOPY-DIAGNOSTIC

## 2024-01-01 PROCEDURE — 96375 TX/PRO/DX INJ NEW DRUG ADDON: CPT

## 2024-01-01 PROCEDURE — 82552 ASSAY OF CPK IN BLOOD: CPT

## 2024-01-01 PROCEDURE — 99291 CRITICAL CARE FIRST HOUR: CPT | Performed by: EMERGENCY MEDICINE

## 2024-01-01 PROCEDURE — 99255 IP/OBS CONSLTJ NEW/EST HI 80: CPT | Performed by: INTERNAL MEDICINE

## 2024-01-01 PROCEDURE — 02HV33Z INSERTION OF INFUSION DEVICE INTO SUPERIOR VENA CAVA, PERCUTANEOUS APPROACH: ICD-10-PCS | Performed by: INTERNAL MEDICINE

## 2024-01-01 PROCEDURE — 71250 CT THORAX DX C-: CPT

## 2024-01-01 PROCEDURE — 93005 ELECTROCARDIOGRAM TRACING: CPT

## 2024-01-01 PROCEDURE — 36620 INSERTION CATHETER ARTERY: CPT

## 2024-01-01 PROCEDURE — 5A1945Z RESPIRATORY VENTILATION, 24-96 CONSECUTIVE HOURS: ICD-10-PCS | Performed by: EMERGENCY MEDICINE

## 2024-01-01 PROCEDURE — 99498 ADVNCD CARE PLAN ADDL 30 MIN: CPT | Performed by: NURSE PRACTITIONER

## 2024-01-01 PROCEDURE — 93005 ELECTROCARDIOGRAM TRACING: CPT | Performed by: INTERNAL MEDICINE

## 2024-01-01 PROCEDURE — 84478 ASSAY OF TRIGLYCERIDES: CPT

## 2024-01-01 PROCEDURE — 700117 HCHG RX CONTRAST REV CODE 255

## 2024-01-01 PROCEDURE — 80076 HEPATIC FUNCTION PANEL: CPT

## 2024-01-01 PROCEDURE — 96367 TX/PROPH/DG ADDL SEQ IV INF: CPT

## 2024-01-01 PROCEDURE — 0B9M8ZZ DRAINAGE OF BILATERAL LUNGS, VIA NATURAL OR ARTIFICIAL OPENING ENDOSCOPIC: ICD-10-PCS | Performed by: INTERNAL MEDICINE

## 2024-01-01 PROCEDURE — 31624 DX BRONCHOSCOPE/LAVAGE: CPT | Mod: 51 | Performed by: INTERNAL MEDICINE

## 2024-01-01 PROCEDURE — 36556 INSERT NON-TUNNEL CV CATH: CPT | Performed by: INTERNAL MEDICINE

## 2024-01-01 PROCEDURE — 84295 ASSAY OF SERUM SODIUM: CPT

## 2024-01-01 PROCEDURE — 31575 DIAGNOSTIC LARYNGOSCOPY: CPT

## 2024-01-01 PROCEDURE — 87015 SPECIMEN INFECT AGNT CONCNTJ: CPT

## 2024-01-01 PROCEDURE — 85384 FIBRINOGEN ACTIVITY: CPT | Mod: 91

## 2024-01-01 PROCEDURE — 160042 HCHG SURGERY MINUTES - EA ADDL 1 MIN LEVEL 5

## 2024-01-01 PROCEDURE — 99292 CRITICAL CARE ADDL 30 MIN: CPT | Mod: 25 | Performed by: EMERGENCY MEDICINE

## 2024-01-01 PROCEDURE — 82150 ASSAY OF AMYLASE: CPT

## 2024-01-01 PROCEDURE — 99255 IP/OBS CONSLTJ NEW/EST HI 80: CPT | Mod: 25 | Performed by: NURSE PRACTITIONER

## 2024-01-01 PROCEDURE — 80074 ACUTE HEPATITIS PANEL: CPT

## 2024-01-01 PROCEDURE — 160031 HCHG SURGERY MINUTES - 1ST 30 MINS LEVEL 5

## 2024-01-01 PROCEDURE — 31645 BRNCHSC W/THER ASPIR 1ST: CPT | Performed by: INTERNAL MEDICINE

## 2024-01-01 PROCEDURE — 83036 HEMOGLOBIN GLYCOSYLATED A1C: CPT

## 2024-01-01 PROCEDURE — 88305 TISSUE EXAM BY PATHOLOGIST: CPT | Mod: 59

## 2024-01-01 PROCEDURE — 0B9J8ZX DRAINAGE OF LEFT LOWER LUNG LOBE, VIA NATURAL OR ARTIFICIAL OPENING ENDOSCOPIC, DIAGNOSTIC: ICD-10-PCS | Performed by: INTERNAL MEDICINE

## 2024-01-01 PROCEDURE — 82330 ASSAY OF CALCIUM: CPT | Mod: 91

## 2024-01-01 PROCEDURE — 87077 CULTURE AEROBIC IDENTIFY: CPT

## 2024-01-01 PROCEDURE — 85576 BLOOD PLATELET AGGREGATION: CPT | Mod: 91

## 2024-01-01 PROCEDURE — 96366 THER/PROPH/DIAG IV INF ADDON: CPT

## 2024-01-01 PROCEDURE — 87641 MR-STAPH DNA AMP PROBE: CPT

## 2024-01-01 PROCEDURE — 87206 SMEAR FLUORESCENT/ACID STAI: CPT

## 2024-01-01 PROCEDURE — 94002 VENT MGMT INPAT INIT DAY: CPT

## 2024-01-01 PROCEDURE — 36430 TRANSFUSION BLD/BLD COMPNT: CPT

## 2024-01-01 PROCEDURE — 87116 MYCOBACTERIA CULTURE: CPT

## 2024-01-01 PROCEDURE — 302214 INTUBATION BOX: Performed by: INTERNAL MEDICINE

## 2024-01-01 PROCEDURE — 03HY32Z INSERTION OF MONITORING DEVICE INTO UPPER ARTERY, PERCUTANEOUS APPROACH: ICD-10-PCS | Performed by: EMERGENCY MEDICINE

## 2024-01-01 PROCEDURE — 51702 INSERT TEMP BLADDER CATH: CPT

## 2024-01-01 PROCEDURE — 95822 EEG COMA OR SLEEP ONLY: CPT | Mod: 26 | Performed by: STUDENT IN AN ORGANIZED HEALTH CARE EDUCATION/TRAINING PROGRAM

## 2024-01-01 PROCEDURE — 160048 HCHG OR STATISTICAL LEVEL 1-5

## 2024-01-01 PROCEDURE — 83690 ASSAY OF LIPASE: CPT

## 2024-01-01 RX ORDER — DEXTROSE MONOHYDRATE 25 G/50ML
25 INJECTION, SOLUTION INTRAVENOUS
Status: DISCONTINUED | OUTPATIENT
Start: 2024-01-01 | End: 2024-01-01

## 2024-01-01 RX ORDER — EPINEPHRINE 0.1 MG/ML
SYRINGE (ML) INJECTION
Status: COMPLETED | OUTPATIENT
Start: 2024-01-01 | End: 2024-01-01

## 2024-01-01 RX ORDER — MAGNESIUM SULFATE HEPTAHYDRATE 40 MG/ML
.5-2 INJECTION, SOLUTION INTRAVENOUS CONTINUOUS
Status: DISCONTINUED | OUTPATIENT
Start: 2024-01-01 | End: 2024-01-01

## 2024-01-01 RX ORDER — FUROSEMIDE 10 MG/ML
100 INJECTION INTRAMUSCULAR; INTRAVENOUS EVERY 8 HOURS
Status: DISCONTINUED | OUTPATIENT
Start: 2024-01-01 | End: 2024-01-01

## 2024-01-01 RX ORDER — HEPARIN SODIUM 1000 [USP'U]/ML
500 INJECTION, SOLUTION INTRAVENOUS; SUBCUTANEOUS
Status: DISCONTINUED | OUTPATIENT
Start: 2024-01-01 | End: 2024-01-01

## 2024-01-01 RX ORDER — BUPROPION HYDROCHLORIDE 150 MG/1
150 TABLET ORAL EVERY MORNING
COMMUNITY

## 2024-01-01 RX ORDER — AMOXICILLIN 250 MG
2 CAPSULE ORAL 2 TIMES DAILY
Status: DISCONTINUED | OUTPATIENT
Start: 2024-01-01 | End: 2024-01-01

## 2024-01-01 RX ORDER — ACETAMINOPHEN 650 MG/1
650 SUPPOSITORY RECTAL EVERY 4 HOURS PRN
Status: DISCONTINUED | OUTPATIENT
Start: 2024-01-01 | End: 2024-06-19 | Stop reason: HOSPADM

## 2024-01-01 RX ORDER — MIDAZOLAM HYDROCHLORIDE 1 MG/ML
5 INJECTION INTRAMUSCULAR; INTRAVENOUS
Status: DISCONTINUED | OUTPATIENT
Start: 2024-01-01 | End: 2024-06-19 | Stop reason: HOSPADM

## 2024-01-01 RX ORDER — BISACODYL 10 MG
10 SUPPOSITORY, RECTAL RECTAL
Status: DISCONTINUED | OUTPATIENT
Start: 2024-01-01 | End: 2024-01-01

## 2024-01-01 RX ORDER — HYDRALAZINE HYDROCHLORIDE 20 MG/ML
20 INJECTION INTRAMUSCULAR; INTRAVENOUS EVERY 4 HOURS PRN
Status: DISCONTINUED | OUTPATIENT
Start: 2024-01-01 | End: 2024-01-01

## 2024-01-01 RX ORDER — SODIUM CHLORIDE, SODIUM LACTATE, POTASSIUM CHLORIDE, AND CALCIUM CHLORIDE .6; .31; .03; .02 G/100ML; G/100ML; G/100ML; G/100ML
30 INJECTION, SOLUTION INTRAVENOUS ONCE
Status: COMPLETED | OUTPATIENT
Start: 2024-01-01 | End: 2024-01-01

## 2024-01-01 RX ORDER — NOREPINEPHRINE BITARTRATE 0.03 MG/ML
0-1 INJECTION, SOLUTION INTRAVENOUS CONTINUOUS
Status: DISCONTINUED | OUTPATIENT
Start: 2024-01-01 | End: 2024-01-01

## 2024-01-01 RX ORDER — HEPARIN SODIUM 1000 [USP'U]/ML
1200 INJECTION, SOLUTION INTRAVENOUS; SUBCUTANEOUS
Status: DISCONTINUED | OUTPATIENT
Start: 2024-01-01 | End: 2024-01-01

## 2024-01-01 RX ORDER — MEPERIDINE HYDROCHLORIDE 50 MG/ML
25 INJECTION INTRAMUSCULAR; INTRAVENOUS; SUBCUTANEOUS
Status: DISCONTINUED | OUTPATIENT
Start: 2024-01-01 | End: 2024-01-01

## 2024-01-01 RX ORDER — CALCIUM CHLORIDE 100 MG/ML
1 INJECTION INTRAVENOUS; INTRAVENTRICULAR ONCE
Status: COMPLETED | OUTPATIENT
Start: 2024-01-01 | End: 2024-01-01

## 2024-01-01 RX ORDER — SODIUM CHLORIDE, SODIUM LACTATE, POTASSIUM CHLORIDE, AND CALCIUM CHLORIDE .6; .31; .03; .02 G/100ML; G/100ML; G/100ML; G/100ML
500 INJECTION, SOLUTION INTRAVENOUS ONCE
Status: COMPLETED | OUTPATIENT
Start: 2024-01-01 | End: 2024-01-01

## 2024-01-01 RX ORDER — HEPARIN SODIUM 1000 [USP'U]/ML
INJECTION, SOLUTION INTRAVENOUS; SUBCUTANEOUS
Status: COMPLETED
Start: 2024-01-01 | End: 2024-01-01

## 2024-01-01 RX ORDER — HEPARIN SODIUM 1000 [USP'U]/ML
300 INJECTION, SOLUTION INTRAVENOUS; SUBCUTANEOUS ONCE
Status: COMPLETED | OUTPATIENT
Start: 2024-01-01 | End: 2024-01-01

## 2024-01-01 RX ORDER — CARBOXYMETHYLCELLULOSE SODIUM 5 MG/ML
1 SOLUTION/ DROPS OPHTHALMIC
Status: DISCONTINUED | OUTPATIENT
Start: 2024-01-01 | End: 2024-06-19 | Stop reason: HOSPADM

## 2024-01-01 RX ORDER — FUROSEMIDE 10 MG/ML
80 INJECTION INTRAMUSCULAR; INTRAVENOUS ONCE
Status: COMPLETED | OUTPATIENT
Start: 2024-01-01 | End: 2024-01-01

## 2024-01-01 RX ORDER — ACETAMINOPHEN 650 MG/1
1000 SUPPOSITORY RECTAL EVERY 6 HOURS
Status: DISCONTINUED | OUTPATIENT
Start: 2024-01-01 | End: 2024-01-01

## 2024-01-01 RX ORDER — DEXTROSE MONOHYDRATE 25 G/50ML
50 INJECTION, SOLUTION INTRAVENOUS ONCE
Status: COMPLETED | OUTPATIENT
Start: 2024-01-01 | End: 2024-01-01

## 2024-01-01 RX ORDER — ACETAMINOPHEN 325 MG/1
650 TABLET ORAL EVERY 6 HOURS PRN
Status: DISCONTINUED | OUTPATIENT
Start: 2024-01-01 | End: 2024-01-01

## 2024-01-01 RX ORDER — FUROSEMIDE 10 MG/ML
120 INJECTION INTRAMUSCULAR; INTRAVENOUS ONCE
Status: COMPLETED | OUTPATIENT
Start: 2024-01-01 | End: 2024-01-01

## 2024-01-01 RX ORDER — POLYETHYLENE GLYCOL 3350 17 G/17G
1 POWDER, FOR SOLUTION ORAL
Status: DISCONTINUED | OUTPATIENT
Start: 2024-01-01 | End: 2024-01-01

## 2024-01-01 RX ORDER — HYDROMORPHONE HYDROCHLORIDE 2 MG/ML
4 INJECTION, SOLUTION INTRAMUSCULAR; INTRAVENOUS; SUBCUTANEOUS
Status: DISCONTINUED | OUTPATIENT
Start: 2024-01-01 | End: 2024-06-19 | Stop reason: HOSPADM

## 2024-01-01 RX ORDER — FAMOTIDINE 20 MG/1
20 TABLET, FILM COATED ORAL EVERY 24 HOURS
Status: DISCONTINUED | OUTPATIENT
Start: 2024-01-01 | End: 2024-01-01

## 2024-01-01 RX ORDER — OLANZAPINE 5 MG/1
5 TABLET ORAL NIGHTLY
COMMUNITY

## 2024-01-01 RX ORDER — HYDRALAZINE HYDROCHLORIDE 20 MG/ML
20 INJECTION INTRAMUSCULAR; INTRAVENOUS EVERY 6 HOURS PRN
Status: DISCONTINUED | OUTPATIENT
Start: 2024-01-01 | End: 2024-01-01

## 2024-01-01 RX ORDER — QUETIAPINE FUMARATE 100 MG/1
100 TABLET, FILM COATED ORAL
COMMUNITY

## 2024-01-01 RX ORDER — HEPARIN SODIUM 1000 [USP'U]/ML
1500 INJECTION, SOLUTION INTRAVENOUS; SUBCUTANEOUS
Status: DISCONTINUED | OUTPATIENT
Start: 2024-01-01 | End: 2024-01-01

## 2024-01-01 RX ORDER — LABETALOL HYDROCHLORIDE 5 MG/ML
10-20 INJECTION, SOLUTION INTRAVENOUS
Status: DISCONTINUED | OUTPATIENT
Start: 2024-01-01 | End: 2024-01-01

## 2024-01-01 RX ORDER — HEPARIN SODIUM 5000 [USP'U]/ML
5000 INJECTION, SOLUTION INTRAVENOUS; SUBCUTANEOUS EVERY 8 HOURS
Status: DISCONTINUED | OUTPATIENT
Start: 2024-01-01 | End: 2024-01-01

## 2024-01-01 RX ORDER — NALOXONE HYDROCHLORIDE 1 MG/ML
INJECTION INTRAMUSCULAR; INTRAVENOUS; SUBCUTANEOUS
Status: COMPLETED
Start: 2024-01-01 | End: 2024-01-01

## 2024-01-01 RX ORDER — BUSPIRONE HYDROCHLORIDE 10 MG/1
15 TABLET ORAL 2 TIMES DAILY
Status: COMPLETED | OUTPATIENT
Start: 2024-01-01 | End: 2024-01-01

## 2024-01-01 RX ORDER — ACETAZOLAMIDE 500 MG/5ML
250 INJECTION, POWDER, LYOPHILIZED, FOR SOLUTION INTRAVENOUS ONCE
Status: COMPLETED | OUTPATIENT
Start: 2024-01-01 | End: 2024-01-01

## 2024-01-01 RX ORDER — MIDAZOLAM HYDROCHLORIDE 1 MG/ML
1-5 INJECTION INTRAMUSCULAR; INTRAVENOUS
Status: DISCONTINUED | OUTPATIENT
Start: 2024-01-01 | End: 2024-01-01

## 2024-01-01 RX ORDER — LABETALOL HYDROCHLORIDE 5 MG/ML
10-20 INJECTION, SOLUTION INTRAVENOUS EVERY 6 HOURS PRN
Status: DISCONTINUED | OUTPATIENT
Start: 2024-01-01 | End: 2024-01-01

## 2024-01-01 RX ORDER — IPRATROPIUM BROMIDE AND ALBUTEROL SULFATE 2.5; .5 MG/3ML; MG/3ML
3 SOLUTION RESPIRATORY (INHALATION)
Status: DISCONTINUED | OUTPATIENT
Start: 2024-01-01 | End: 2024-01-01

## 2024-01-01 RX ORDER — ENOXAPARIN SODIUM 100 MG/ML
40 INJECTION SUBCUTANEOUS DAILY
Status: DISCONTINUED | OUTPATIENT
Start: 2024-01-01 | End: 2024-01-01

## 2024-01-01 RX ORDER — HYDROMORPHONE HYDROCHLORIDE 2 MG/ML
2 INJECTION, SOLUTION INTRAMUSCULAR; INTRAVENOUS; SUBCUTANEOUS
Status: DISCONTINUED | OUTPATIENT
Start: 2024-01-01 | End: 2024-06-19 | Stop reason: HOSPADM

## 2024-01-01 RX ORDER — ONDANSETRON 4 MG/1
4 TABLET, ORALLY DISINTEGRATING ORAL EVERY 4 HOURS PRN
Status: DISCONTINUED | OUTPATIENT
Start: 2024-01-01 | End: 2024-01-01

## 2024-01-01 RX ORDER — SODIUM BICARBONATE IN D5W 150/1000ML
PLASTIC BAG, INJECTION (ML) INTRAVENOUS CONTINUOUS
Status: DISCONTINUED | OUTPATIENT
Start: 2024-01-01 | End: 2024-01-01

## 2024-01-01 RX ORDER — HYDROMORPHONE HYDROCHLORIDE 1 MG/ML
.5-1 INJECTION, SOLUTION INTRAMUSCULAR; INTRAVENOUS; SUBCUTANEOUS
Status: DISCONTINUED | OUTPATIENT
Start: 2024-01-01 | End: 2024-01-01

## 2024-01-01 RX ORDER — NALOXONE HYDROCHLORIDE 1 MG/ML
2 INJECTION INTRAMUSCULAR; INTRAVENOUS; SUBCUTANEOUS ONCE
Status: COMPLETED | OUTPATIENT
Start: 2024-01-01 | End: 2024-01-01

## 2024-01-01 RX ORDER — FUROSEMIDE 10 MG/ML
120 INJECTION INTRAMUSCULAR; INTRAVENOUS
Status: DISCONTINUED | OUTPATIENT
Start: 2024-01-01 | End: 2024-01-01

## 2024-01-01 RX ORDER — MIDAZOLAM HYDROCHLORIDE 1 MG/ML
5 INJECTION INTRAMUSCULAR; INTRAVENOUS ONCE
Status: COMPLETED | OUTPATIENT
Start: 2024-01-01 | End: 2024-01-01

## 2024-01-01 RX ORDER — LABETALOL HYDROCHLORIDE 5 MG/ML
20 INJECTION, SOLUTION INTRAVENOUS
Status: DISCONTINUED | OUTPATIENT
Start: 2024-01-01 | End: 2024-01-01

## 2024-01-01 RX ORDER — EPINEPHRINE HCL IN 0.9 % NACL 4MG/250ML
0-.5 PLASTIC BAG, INJECTION (ML) INTRAVENOUS CONTINUOUS
Status: DISCONTINUED | OUTPATIENT
Start: 2024-01-01 | End: 2024-01-01

## 2024-01-01 RX ORDER — SODIUM CHLORIDE, SODIUM LACTATE, POTASSIUM CHLORIDE, CALCIUM CHLORIDE 600; 310; 30; 20 MG/100ML; MG/100ML; MG/100ML; MG/100ML
INJECTION, SOLUTION INTRAVENOUS CONTINUOUS
Status: DISCONTINUED | OUTPATIENT
Start: 2024-01-01 | End: 2024-01-01

## 2024-01-01 RX ORDER — ONDANSETRON 2 MG/ML
4 INJECTION INTRAMUSCULAR; INTRAVENOUS EVERY 4 HOURS PRN
Status: DISCONTINUED | OUTPATIENT
Start: 2024-01-01 | End: 2024-01-01

## 2024-01-01 RX ORDER — VECURONIUM BROMIDE 1 MG/ML
0.1 INJECTION, POWDER, LYOPHILIZED, FOR SOLUTION INTRAVENOUS ONCE
Status: COMPLETED | OUTPATIENT
Start: 2024-01-01 | End: 2024-01-01

## 2024-01-01 RX ORDER — ACETAMINOPHEN 325 MG/1
650 TABLET ORAL EVERY 4 HOURS PRN
Status: DISCONTINUED | OUTPATIENT
Start: 2024-01-01 | End: 2024-01-01

## 2024-01-01 RX ORDER — ACETAMINOPHEN 500 MG
1000 TABLET ORAL EVERY 6 HOURS
Status: DISCONTINUED | OUTPATIENT
Start: 2024-01-01 | End: 2024-01-01

## 2024-01-01 RX ORDER — ALBUMIN (HUMAN) 12.5 G/50ML
12.5 SOLUTION INTRAVENOUS ONCE
Status: COMPLETED | OUTPATIENT
Start: 2024-01-01 | End: 2024-01-01

## 2024-01-01 RX ORDER — ONDANSETRON 2 MG/ML
8 INJECTION INTRAMUSCULAR; INTRAVENOUS EVERY 8 HOURS PRN
Status: DISCONTINUED | OUTPATIENT
Start: 2024-01-01 | End: 2024-06-19 | Stop reason: HOSPADM

## 2024-01-01 RX ORDER — ONDANSETRON 4 MG/1
8 TABLET, ORALLY DISINTEGRATING ORAL EVERY 8 HOURS PRN
Status: DISCONTINUED | OUTPATIENT
Start: 2024-01-01 | End: 2024-06-19 | Stop reason: HOSPADM

## 2024-01-01 RX ORDER — FUROSEMIDE 10 MG/ML
40 INJECTION INTRAMUSCULAR; INTRAVENOUS ONCE
Status: COMPLETED | OUTPATIENT
Start: 2024-01-01 | End: 2024-01-01

## 2024-01-01 RX ADMIN — MAGNESIUM SULFATE IN WATER 0.5 G/HR: 20 INJECTION, SOLUTION INTRAVENOUS at 21:32

## 2024-01-01 RX ADMIN — HUMAN ALBUMIN MICROSPHERES AND PERFLUTREN 3 ML: 10; .22 INJECTION, SOLUTION INTRAVENOUS at 16:00

## 2024-01-01 RX ADMIN — MEPERIDINE HYDROCHLORIDE 25 MG: 50 INJECTION INTRAMUSCULAR; INTRAVENOUS; SUBCUTANEOUS at 03:10

## 2024-01-01 RX ADMIN — MIDAZOLAM HYDROCHLORIDE 5 MG: 1 INJECTION, SOLUTION INTRAMUSCULAR; INTRAVENOUS at 10:09

## 2024-01-01 RX ADMIN — FAMOTIDINE 20 MG: 20 TABLET, FILM COATED ORAL at 05:15

## 2024-01-01 RX ADMIN — THIAMINE HYDROCHLORIDE 250 MG: 100 INJECTION, SOLUTION INTRAMUSCULAR; INTRAVENOUS at 18:18

## 2024-01-01 RX ADMIN — LABETALOL HYDROCHLORIDE 20 MG: 5 INJECTION, SOLUTION INTRAVENOUS at 14:26

## 2024-01-01 RX ADMIN — SODIUM CHLORIDE, POTASSIUM CHLORIDE, SODIUM LACTATE AND CALCIUM CHLORIDE: 600; 310; 30; 20 INJECTION, SOLUTION INTRAVENOUS at 14:13

## 2024-01-01 RX ADMIN — HEPARIN SODIUM 5000 UNITS: 5000 INJECTION, SOLUTION INTRAVENOUS; SUBCUTANEOUS at 05:23

## 2024-01-01 RX ADMIN — BUSPIRONE HYDROCHLORIDE 15 MG: 10 TABLET ORAL at 05:15

## 2024-01-01 RX ADMIN — CALCIUM CHLORIDE 1000 MG: 100 INJECTION, SOLUTION INTRAVENOUS at 13:26

## 2024-01-01 RX ADMIN — NALOXONE HYDROCHLORIDE 2 MG: 1 INJECTION PARENTERAL at 09:50

## 2024-01-01 RX ADMIN — HYDROMORPHONE HYDROCHLORIDE 2 MG/HR: 10 INJECTION, SOLUTION INTRAMUSCULAR; INTRAVENOUS; SUBCUTANEOUS at 04:31

## 2024-01-01 RX ADMIN — CALCIUM CHLORIDE 1 G: 100 INJECTION, SOLUTION INTRAVENOUS at 02:55

## 2024-01-01 RX ADMIN — SODIUM BICARBONATE 100 MEQ: 84 INJECTION INTRAVENOUS at 15:23

## 2024-01-01 RX ADMIN — DOCUSATE SODIUM 50 MG AND SENNOSIDES 8.6 MG 2 TABLET: 8.6; 5 TABLET, FILM COATED ORAL at 05:22

## 2024-01-01 RX ADMIN — HEPARIN SODIUM 5000 UNITS: 5000 INJECTION, SOLUTION INTRAVENOUS; SUBCUTANEOUS at 15:38

## 2024-01-01 RX ADMIN — DOCUSATE SODIUM 50 MG AND SENNOSIDES 8.6 MG 2 TABLET: 8.6; 5 TABLET, FILM COATED ORAL at 17:07

## 2024-01-01 RX ADMIN — BUSPIRONE HYDROCHLORIDE 15 MG: 10 TABLET ORAL at 21:38

## 2024-01-01 RX ADMIN — PROPOFOL 40 MCG/KG/MIN: 10 INJECTION, EMULSION INTRAVENOUS at 18:44

## 2024-01-01 RX ADMIN — Medication 10 MCG: at 07:11

## 2024-01-01 RX ADMIN — THIAMINE HYDROCHLORIDE 250 MG: 100 INJECTION, SOLUTION INTRAMUSCULAR; INTRAVENOUS at 05:21

## 2024-01-01 RX ADMIN — FUROSEMIDE 120 MG: 10 INJECTION, SOLUTION INTRAMUSCULAR; INTRAVENOUS at 08:18

## 2024-01-01 RX ADMIN — PROPOFOL 40 MCG/KG/MIN: 10 INJECTION, EMULSION INTRAVENOUS at 00:03

## 2024-01-01 RX ADMIN — HEPARIN SODIUM 5000 UNITS: 5000 INJECTION, SOLUTION INTRAVENOUS; SUBCUTANEOUS at 22:08

## 2024-01-01 RX ADMIN — EPINEPHRINE 1 MG: 0.1 INJECTION, SOLUTION INTRAVENOUS at 06:51

## 2024-01-01 RX ADMIN — NOREPINEPHRINE BITARTRATE 0.1 MCG/KG/MIN: 1 INJECTION, SOLUTION, CONCENTRATE INTRAVENOUS at 11:46

## 2024-01-01 RX ADMIN — CARBOXYMETHYLCELLULOSE SODIUM 1 DROP: 5 SOLUTION/ DROPS OPHTHALMIC at 22:28

## 2024-01-01 RX ADMIN — HEPARIN SODIUM 24600 UNITS: 1000 INJECTION, SOLUTION INTRAVENOUS; SUBCUTANEOUS at 15:40

## 2024-01-01 RX ADMIN — MINERAL OIL, AND WHITE PETROLATUM 1 APPLICATION: 425; 573 OINTMENT OPHTHALMIC at 17:07

## 2024-01-01 RX ADMIN — HYDROMORPHONE HYDROCHLORIDE 10 MG/HR: 10 INJECTION, SOLUTION INTRAMUSCULAR; INTRAVENOUS; SUBCUTANEOUS at 14:24

## 2024-01-01 RX ADMIN — HEPARIN SODIUM 5000 UNITS: 5000 INJECTION, SOLUTION INTRAVENOUS; SUBCUTANEOUS at 13:34

## 2024-01-01 RX ADMIN — HYDROMORPHONE HYDROCHLORIDE 1 MG: 1 INJECTION, SOLUTION INTRAMUSCULAR; INTRAVENOUS; SUBCUTANEOUS at 21:08

## 2024-01-01 RX ADMIN — MIDAZOLAM HYDROCHLORIDE 5 MG: 1 INJECTION, SOLUTION INTRAMUSCULAR; INTRAVENOUS at 15:55

## 2024-01-01 RX ADMIN — VECURONIUM BROMIDE 1 MCG/KG/MIN: 1 INJECTION, POWDER, LYOPHILIZED, FOR SOLUTION INTRAVENOUS at 06:10

## 2024-01-01 RX ADMIN — MINERAL OIL, AND WHITE PETROLATUM 1 APPLICATION: 425; 573 OINTMENT OPHTHALMIC at 22:00

## 2024-01-01 RX ADMIN — HYDROMORPHONE HYDROCHLORIDE 4 MG/HR: 10 INJECTION, SOLUTION INTRAMUSCULAR; INTRAVENOUS; SUBCUTANEOUS at 00:51

## 2024-01-01 RX ADMIN — CALCIUM CHLORIDE 1000 MG: 100 INJECTION, SOLUTION INTRAVENOUS at 16:41

## 2024-01-01 RX ADMIN — PROPOFOL 40 MCG/KG/MIN: 10 INJECTION, EMULSION INTRAVENOUS at 04:09

## 2024-01-01 RX ADMIN — HEPARIN SODIUM 5000 UNITS: 5000 INJECTION, SOLUTION INTRAVENOUS; SUBCUTANEOUS at 21:39

## 2024-01-01 RX ADMIN — FAMOTIDINE 20 MG: 20 TABLET, FILM COATED ORAL at 05:25

## 2024-01-01 RX ADMIN — CHLOROTHIAZIDE SODIUM 500 MG: 500 INJECTION, POWDER, LYOPHILIZED, FOR SOLUTION INTRAVENOUS at 08:33

## 2024-01-01 RX ADMIN — MINERAL OIL, AND WHITE PETROLATUM 1 APPLICATION: 425; 573 OINTMENT OPHTHALMIC at 13:47

## 2024-01-01 RX ADMIN — EPINEPHRINE 0.15 MCG/KG/MIN: 1 INJECTION INTRAMUSCULAR; INTRAVENOUS; SUBCUTANEOUS at 07:18

## 2024-01-01 RX ADMIN — HYDROMORPHONE HYDROCHLORIDE 8 MG: 2 INJECTION INTRAMUSCULAR; INTRAVENOUS; SUBCUTANEOUS at 15:44

## 2024-01-01 RX ADMIN — MEPERIDINE HYDROCHLORIDE 25 MG: 50 INJECTION INTRAMUSCULAR; INTRAVENOUS; SUBCUTANEOUS at 23:25

## 2024-01-01 RX ADMIN — DEXTROSE MONOHYDRATE 25 G: 100 INJECTION, SOLUTION INTRAVENOUS at 21:06

## 2024-01-01 RX ADMIN — FAMOTIDINE 20 MG: 20 TABLET, FILM COATED ORAL at 05:22

## 2024-01-01 RX ADMIN — HYDROMORPHONE HYDROCHLORIDE 1 MG/HR: 10 INJECTION, SOLUTION INTRAMUSCULAR; INTRAVENOUS; SUBCUTANEOUS at 08:17

## 2024-01-01 RX ADMIN — SODIUM BICARBONATE 50 MEQ: 84 INJECTION, SOLUTION INTRAVENOUS at 07:16

## 2024-01-01 RX ADMIN — AMPICILLIN AND SULBACTAM 3 G: 1; 2 INJECTION, POWDER, FOR SOLUTION INTRAMUSCULAR; INTRAVENOUS at 19:00

## 2024-01-01 RX ADMIN — MINERAL OIL, AND WHITE PETROLATUM 1 APPLICATION: 425; 573 OINTMENT OPHTHALMIC at 13:22

## 2024-01-01 RX ADMIN — SODIUM BICARBONATE: 84 INJECTION, SOLUTION INTRAVENOUS at 10:53

## 2024-01-01 RX ADMIN — DEXTROSE MONOHYDRATE 25 G: 100 INJECTION, SOLUTION INTRAVENOUS at 02:12

## 2024-01-01 RX ADMIN — Medication 50 MCG/HR: at 22:34

## 2024-01-01 RX ADMIN — HEPARIN SODIUM 5000 UNITS: 5000 INJECTION, SOLUTION INTRAVENOUS; SUBCUTANEOUS at 05:06

## 2024-01-01 RX ADMIN — NOREPINEPHRINE BITARTRATE 1 MCG/KG/MIN: 1 INJECTION, SOLUTION, CONCENTRATE INTRAVENOUS at 07:32

## 2024-01-01 RX ADMIN — FAMOTIDINE 20 MG: 20 TABLET, FILM COATED ORAL at 14:12

## 2024-01-01 RX ADMIN — INSULIN HUMAN 4 UNITS: 100 INJECTION, SOLUTION PARENTERAL at 09:16

## 2024-01-01 RX ADMIN — DOCUSATE SODIUM 50 MG AND SENNOSIDES 8.6 MG 2 TABLET: 8.6; 5 TABLET, FILM COATED ORAL at 05:15

## 2024-01-01 RX ADMIN — PROPOFOL 40 MCG/KG/MIN: 10 INJECTION, EMULSION INTRAVENOUS at 13:43

## 2024-01-01 RX ADMIN — HYDROMORPHONE HYDROCHLORIDE 3 MG/HR: 10 INJECTION, SOLUTION INTRAMUSCULAR; INTRAVENOUS; SUBCUTANEOUS at 05:15

## 2024-01-01 RX ADMIN — ACETAMINOPHEN 1000 MG: 500 TABLET, FILM COATED ORAL at 17:48

## 2024-01-01 RX ADMIN — FENTANYL CITRATE 100 MCG: 50 INJECTION, SOLUTION INTRAMUSCULAR; INTRAVENOUS at 22:28

## 2024-01-01 RX ADMIN — HEPARIN SODIUM 500 UNITS: 1000 INJECTION, SOLUTION INTRAVENOUS; SUBCUTANEOUS at 10:05

## 2024-01-01 RX ADMIN — FUROSEMIDE 120 MG: 10 INJECTION, SOLUTION INTRAVENOUS at 21:55

## 2024-01-01 RX ADMIN — DEXTROSE MONOHYDRATE 25 G: 100 INJECTION, SOLUTION INTRAVENOUS at 03:04

## 2024-01-01 RX ADMIN — HEPARIN SODIUM 5000 UNITS: 5000 INJECTION, SOLUTION INTRAVENOUS; SUBCUTANEOUS at 21:59

## 2024-01-01 RX ADMIN — LABETALOL HYDROCHLORIDE 10 MG: 5 INJECTION, SOLUTION INTRAVENOUS at 22:24

## 2024-01-01 RX ADMIN — LABETALOL HYDROCHLORIDE 10 MG: 5 INJECTION, SOLUTION INTRAVENOUS at 07:44

## 2024-01-01 RX ADMIN — VECURONIUM BROMIDE 6.84 MG: 1 INJECTION, POWDER, LYOPHILIZED, FOR SOLUTION INTRAVENOUS at 05:59

## 2024-01-01 RX ADMIN — HEPARIN SODIUM 5000 UNITS: 5000 INJECTION, SOLUTION INTRAVENOUS; SUBCUTANEOUS at 05:38

## 2024-01-01 RX ADMIN — MIDAZOLAM HYDROCHLORIDE 10 MG: 1 INJECTION, SOLUTION INTRAMUSCULAR; INTRAVENOUS at 15:44

## 2024-01-01 RX ADMIN — HEPARIN SODIUM 5000 UNITS: 5000 INJECTION, SOLUTION INTRAVENOUS; SUBCUTANEOUS at 13:46

## 2024-01-01 RX ADMIN — ACETAMINOPHEN 650 MG: 325 TABLET, FILM COATED ORAL at 15:03

## 2024-01-01 RX ADMIN — SODIUM CHLORIDE, POTASSIUM CHLORIDE, SODIUM LACTATE AND CALCIUM CHLORIDE: 600; 310; 30; 20 INJECTION, SOLUTION INTRAVENOUS at 11:36

## 2024-01-01 RX ADMIN — FAMOTIDINE 20 MG: 20 TABLET, FILM COATED ORAL at 05:23

## 2024-01-01 RX ADMIN — SODIUM BICARBONATE: 84 INJECTION, SOLUTION INTRAVENOUS at 02:19

## 2024-01-01 RX ADMIN — ACETAMINOPHEN 1000 MG: 500 TABLET, FILM COATED ORAL at 23:46

## 2024-01-01 RX ADMIN — HEPARIN SODIUM 1200 UNITS: 1000 INJECTION, SOLUTION INTRAVENOUS; SUBCUTANEOUS at 13:05

## 2024-01-01 RX ADMIN — HYDROMORPHONE HYDROCHLORIDE 4 MG: 2 INJECTION INTRAMUSCULAR; INTRAVENOUS; SUBCUTANEOUS at 15:55

## 2024-01-01 RX ADMIN — PROPOFOL 40 MCG/KG/MIN: 10 INJECTION, EMULSION INTRAVENOUS at 00:39

## 2024-01-01 RX ADMIN — AMPICILLIN AND SULBACTAM 3 G: 1; 2 INJECTION, POWDER, FOR SOLUTION INTRAMUSCULAR; INTRAVENOUS at 12:15

## 2024-01-01 RX ADMIN — SODIUM CHLORIDE, POTASSIUM CHLORIDE, SODIUM LACTATE AND CALCIUM CHLORIDE 2178 ML: 600; 310; 30; 20 INJECTION, SOLUTION INTRAVENOUS at 07:11

## 2024-01-01 RX ADMIN — EPINEPHRINE 1 MG: 0.1 INJECTION, SOLUTION INTRAVENOUS at 06:54

## 2024-01-01 RX ADMIN — PROPOFOL 80 MCG/KG/MIN: 10 INJECTION, EMULSION INTRAVENOUS at 06:02

## 2024-01-01 RX ADMIN — MAGNESIUM SULFATE HEPTAHYDRATE: 500 INJECTION, SOLUTION INTRAMUSCULAR; INTRAVENOUS at 14:36

## 2024-01-01 RX ADMIN — MEPERIDINE HYDROCHLORIDE 25 MG: 50 INJECTION INTRAMUSCULAR; INTRAVENOUS; SUBCUTANEOUS at 05:30

## 2024-01-01 RX ADMIN — HEPARIN SODIUM 1500 UNITS: 1000 INJECTION, SOLUTION INTRAVENOUS; SUBCUTANEOUS at 10:05

## 2024-01-01 RX ADMIN — HEPARIN SODIUM 5000 UNITS: 5000 INJECTION, SOLUTION INTRAVENOUS; SUBCUTANEOUS at 13:30

## 2024-01-01 RX ADMIN — AMPICILLIN AND SULBACTAM 3 G: 1; 2 INJECTION, POWDER, FOR SOLUTION INTRAMUSCULAR; INTRAVENOUS at 05:28

## 2024-01-01 RX ADMIN — ACETAZOLAMIDE 250 MG: 500 INJECTION, POWDER, LYOPHILIZED, FOR SOLUTION INTRAVENOUS at 09:39

## 2024-01-01 RX ADMIN — AMPICILLIN AND SULBACTAM 3 G: 1; 2 INJECTION, POWDER, FOR SOLUTION INTRAMUSCULAR; INTRAVENOUS at 18:54

## 2024-01-01 RX ADMIN — SODIUM CHLORIDE, POTASSIUM CHLORIDE, SODIUM LACTATE AND CALCIUM CHLORIDE: 600; 310; 30; 20 INJECTION, SOLUTION INTRAVENOUS at 17:10

## 2024-01-01 RX ADMIN — AMPICILLIN AND SULBACTAM 3 G: 1; 2 INJECTION, POWDER, FOR SOLUTION INTRAMUSCULAR; INTRAVENOUS at 01:31

## 2024-01-01 RX ADMIN — HEPARIN SODIUM 5000 UNITS: 5000 INJECTION, SOLUTION INTRAVENOUS; SUBCUTANEOUS at 22:28

## 2024-01-01 RX ADMIN — BUSPIRONE HYDROCHLORIDE 15 MG: 10 TABLET ORAL at 17:07

## 2024-01-01 RX ADMIN — HEPARIN SODIUM 5000 UNITS: 5000 INJECTION, SOLUTION INTRAVENOUS; SUBCUTANEOUS at 21:54

## 2024-01-01 RX ADMIN — SODIUM BICARBONATE 50 MEQ: 84 INJECTION INTRAVENOUS at 08:18

## 2024-01-01 RX ADMIN — HEPARIN SODIUM 5000 UNITS: 5000 INJECTION, SOLUTION INTRAVENOUS; SUBCUTANEOUS at 05:24

## 2024-01-01 RX ADMIN — LABETALOL HYDROCHLORIDE 10 MG: 5 INJECTION, SOLUTION INTRAVENOUS at 08:41

## 2024-01-01 RX ADMIN — AMPICILLIN AND SULBACTAM 3 G: 1; 2 INJECTION, POWDER, FOR SOLUTION INTRAMUSCULAR; INTRAVENOUS at 22:37

## 2024-01-01 RX ADMIN — HUMAN ALBUMIN MICROSPHERES AND PERFLUTREN 3 ML: 10; .22 INJECTION, SOLUTION INTRAVENOUS at 14:30

## 2024-01-01 RX ADMIN — MINERAL OIL, AND WHITE PETROLATUM 1 APPLICATION: 425; 573 OINTMENT OPHTHALMIC at 06:00

## 2024-01-01 RX ADMIN — CALCIUM CHLORIDE 1 G: 100 INJECTION, SOLUTION INTRAVENOUS at 07:47

## 2024-01-01 RX ADMIN — BUSPIRONE HYDROCHLORIDE 15 MG: 10 TABLET ORAL at 17:25

## 2024-01-01 RX ADMIN — SODIUM BICARBONATE 100 MEQ: 84 INJECTION INTRAVENOUS at 02:53

## 2024-01-01 RX ADMIN — HEPARIN SODIUM 1200 UNITS: 1000 INJECTION, SOLUTION INTRAVENOUS; SUBCUTANEOUS at 11:44

## 2024-01-01 RX ADMIN — FENTANYL CITRATE 100 MCG: 50 INJECTION, SOLUTION INTRAMUSCULAR; INTRAVENOUS at 23:28

## 2024-01-01 RX ADMIN — AMPICILLIN AND SULBACTAM 3 G: 1; 2 INJECTION, POWDER, FOR SOLUTION INTRAMUSCULAR; INTRAVENOUS at 17:35

## 2024-01-01 RX ADMIN — HEPARIN SODIUM 5000 UNITS: 5000 INJECTION, SOLUTION INTRAVENOUS; SUBCUTANEOUS at 13:20

## 2024-01-01 RX ADMIN — ACETAMINOPHEN 650 MG: 325 TABLET, FILM COATED ORAL at 08:52

## 2024-01-01 RX ADMIN — PROPOFOL 5 MCG/KG/MIN: 10 INJECTION, EMULSION INTRAVENOUS at 02:00

## 2024-01-01 RX ADMIN — PROPOFOL 20 MCG/KG/MIN: 10 INJECTION, EMULSION INTRAVENOUS at 17:18

## 2024-01-01 RX ADMIN — MEPERIDINE HYDROCHLORIDE 25 MG: 50 INJECTION INTRAMUSCULAR; INTRAVENOUS; SUBCUTANEOUS at 21:26

## 2024-01-01 RX ADMIN — BUSPIRONE HYDROCHLORIDE 15 MG: 10 TABLET ORAL at 05:24

## 2024-01-01 RX ADMIN — SODIUM BICARBONATE: 84 INJECTION, SOLUTION INTRAVENOUS at 17:13

## 2024-01-01 RX ADMIN — AMPICILLIN AND SULBACTAM 3 G: 1; 2 INJECTION, POWDER, FOR SOLUTION INTRAMUSCULAR; INTRAVENOUS at 05:14

## 2024-01-01 RX ADMIN — HEPARIN SODIUM 5000 UNITS: 5000 INJECTION, SOLUTION INTRAVENOUS; SUBCUTANEOUS at 05:45

## 2024-01-01 RX ADMIN — THIAMINE HYDROCHLORIDE 250 MG: 100 INJECTION, SOLUTION INTRAMUSCULAR; INTRAVENOUS at 05:30

## 2024-01-01 RX ADMIN — THIAMINE HYDROCHLORIDE 250 MG: 100 INJECTION, SOLUTION INTRAMUSCULAR; INTRAVENOUS at 05:12

## 2024-01-01 RX ADMIN — MINERAL OIL, AND WHITE PETROLATUM 1 APPLICATION: 425; 573 OINTMENT OPHTHALMIC at 21:04

## 2024-01-01 RX ADMIN — CALCIUM CHLORIDE 1000 MG: 100 INJECTION, SOLUTION INTRAVENOUS at 07:30

## 2024-01-01 RX ADMIN — CALCIUM CHLORIDE 1000 MG: 100 INJECTION, SOLUTION INTRAVENOUS at 02:30

## 2024-01-01 RX ADMIN — CARBOXYMETHYLCELLULOSE SODIUM 1 DROP: 5 SOLUTION/ DROPS OPHTHALMIC at 16:00

## 2024-01-01 RX ADMIN — DEXTROSE MONOHYDRATE 25 G: 100 INJECTION, SOLUTION INTRAVENOUS at 09:15

## 2024-01-01 RX ADMIN — HEPARIN SODIUM 5000 UNITS: 5000 INJECTION, SOLUTION INTRAVENOUS; SUBCUTANEOUS at 14:01

## 2024-01-01 RX ADMIN — INSULIN HUMAN 8 UNITS: 100 INJECTION, SOLUTION PARENTERAL at 21:59

## 2024-01-01 RX ADMIN — HEPARIN SODIUM 5000 UNITS: 5000 INJECTION, SOLUTION INTRAVENOUS; SUBCUTANEOUS at 21:55

## 2024-01-01 RX ADMIN — FAMOTIDINE 20 MG: 20 TABLET, FILM COATED ORAL at 05:06

## 2024-01-01 RX ADMIN — MINERAL OIL, AND WHITE PETROLATUM 1 APPLICATION: 425; 573 OINTMENT OPHTHALMIC at 14:03

## 2024-01-01 RX ADMIN — THIAMINE HYDROCHLORIDE 250 MG: 100 INJECTION, SOLUTION INTRAMUSCULAR; INTRAVENOUS at 17:40

## 2024-01-01 RX ADMIN — DEXTROSE MONOHYDRATE 25 G: 100 INJECTION, SOLUTION INTRAVENOUS at 22:06

## 2024-01-01 RX ADMIN — EPINEPHRINE: 0.1 INJECTION, SOLUTION INTRAVENOUS at 07:13

## 2024-01-01 RX ADMIN — SODIUM BICARBONATE: 84 INJECTION, SOLUTION INTRAVENOUS at 09:39

## 2024-01-01 RX ADMIN — CALCIUM CHLORIDE 1000 MG: 100 INJECTION, SOLUTION INTRAVENOUS at 22:01

## 2024-01-01 RX ADMIN — SODIUM BICARBONATE 50 MEQ: 84 INJECTION, SOLUTION INTRAVENOUS at 07:29

## 2024-01-01 RX ADMIN — AMPICILLIN AND SULBACTAM 3 G: 1; 2 INJECTION, POWDER, FOR SOLUTION INTRAMUSCULAR; INTRAVENOUS at 23:35

## 2024-01-01 RX ADMIN — HUMAN ALBUMIN MICROSPHERES AND PERFLUTREN 3 ML: 10; .22 INJECTION, SOLUTION INTRAVENOUS at 09:00

## 2024-01-01 RX ADMIN — SODIUM BICARBONATE: 84 INJECTION, SOLUTION INTRAVENOUS at 01:27

## 2024-01-01 RX ADMIN — NOREPINEPHRINE BITARTRATE 0.22 MCG/KG/MIN: 1 INJECTION, SOLUTION, CONCENTRATE INTRAVENOUS at 18:34

## 2024-01-01 RX ADMIN — FUROSEMIDE 40 MG: 10 INJECTION INTRAMUSCULAR; INTRAVENOUS at 15:02

## 2024-01-01 RX ADMIN — Medication 200 MCG/HR: at 07:09

## 2024-01-01 RX ADMIN — HEPARIN SODIUM 5000 UNITS: 5000 INJECTION, SOLUTION INTRAVENOUS; SUBCUTANEOUS at 21:03

## 2024-01-01 RX ADMIN — INSULIN HUMAN 5 UNITS: 100 INJECTION, SOLUTION PARENTERAL at 02:08

## 2024-01-01 RX ADMIN — SODIUM BICARBONATE 50 MEQ: 84 INJECTION INTRAVENOUS at 21:11

## 2024-01-01 RX ADMIN — SODIUM BICARBONATE 50 MEQ: 84 INJECTION, SOLUTION INTRAVENOUS at 06:53

## 2024-01-01 RX ADMIN — HUMAN ALBUMIN MICROSPHERES AND PERFLUTREN 3 ML: 10; .22 INJECTION, SOLUTION INTRAVENOUS at 18:00

## 2024-01-01 RX ADMIN — BUSPIRONE HYDROCHLORIDE 15 MG: 10 TABLET ORAL at 05:22

## 2024-01-01 RX ADMIN — MINERAL OIL, AND WHITE PETROLATUM 1 APPLICATION: 425; 573 OINTMENT OPHTHALMIC at 14:00

## 2024-01-01 RX ADMIN — MINERAL OIL, AND WHITE PETROLATUM 1 APPLICATION: 425; 573 OINTMENT OPHTHALMIC at 05:24

## 2024-01-01 RX ADMIN — EPINEPHRINE 0.4 MCG/KG/MIN: 1 INJECTION INTRAMUSCULAR; INTRAVENOUS; SUBCUTANEOUS at 10:01

## 2024-01-01 RX ADMIN — SODIUM BICARBONATE: 84 INJECTION, SOLUTION INTRAVENOUS at 18:57

## 2024-01-01 RX ADMIN — HEPARIN SODIUM 5000 UNITS: 5000 INJECTION, SOLUTION INTRAVENOUS; SUBCUTANEOUS at 05:15

## 2024-01-01 RX ADMIN — LABETALOL HYDROCHLORIDE 20 MG: 5 INJECTION, SOLUTION INTRAVENOUS at 22:49

## 2024-01-01 RX ADMIN — EPINEPHRINE 0.4 MCG/KG/MIN: 1 INJECTION INTRAMUSCULAR; INTRAVENOUS; SUBCUTANEOUS at 11:29

## 2024-01-01 RX ADMIN — ALBUMIN (HUMAN) 12.5 G: 0.25 INJECTION, SOLUTION INTRAVENOUS at 13:33

## 2024-01-01 RX ADMIN — Medication 200 MCG/HR: at 05:50

## 2024-01-01 RX ADMIN — ACETAMINOPHEN 1000 MG: 500 TABLET, FILM COATED ORAL at 05:15

## 2024-01-01 RX ADMIN — HEPARIN SODIUM 5000 UNITS: 5000 INJECTION, SOLUTION INTRAVENOUS; SUBCUTANEOUS at 05:30

## 2024-01-01 RX ADMIN — MINERAL OIL, AND WHITE PETROLATUM 1 APPLICATION: 425; 573 OINTMENT OPHTHALMIC at 13:40

## 2024-01-01 RX ADMIN — FUROSEMIDE 80 MG: 10 INJECTION, SOLUTION INTRAVENOUS at 13:18

## 2024-01-01 RX ADMIN — INSULIN HUMAN 2 UNITS: 100 INJECTION, SOLUTION PARENTERAL at 18:31

## 2024-01-01 RX ADMIN — AMPICILLIN AND SULBACTAM 3 G: 1; 2 INJECTION, POWDER, FOR SOLUTION INTRAMUSCULAR; INTRAVENOUS at 05:17

## 2024-01-01 RX ADMIN — HEPARIN SODIUM 5000 UNITS: 5000 INJECTION, SOLUTION INTRAVENOUS; SUBCUTANEOUS at 13:22

## 2024-01-01 RX ADMIN — ACETAMINOPHEN 1000 MG: 500 TABLET, FILM COATED ORAL at 14:11

## 2024-01-01 RX ADMIN — DOCUSATE SODIUM 50 MG AND SENNOSIDES 8.6 MG 2 TABLET: 8.6; 5 TABLET, FILM COATED ORAL at 05:24

## 2024-01-01 RX ADMIN — LABETALOL HYDROCHLORIDE 10 MG: 5 INJECTION, SOLUTION INTRAVENOUS at 06:30

## 2024-01-01 RX ADMIN — SODIUM CHLORIDE, POTASSIUM CHLORIDE, SODIUM LACTATE AND CALCIUM CHLORIDE: 600; 310; 30; 20 INJECTION, SOLUTION INTRAVENOUS at 04:30

## 2024-01-01 RX ADMIN — THIAMINE HYDROCHLORIDE 250 MG: 100 INJECTION, SOLUTION INTRAMUSCULAR; INTRAVENOUS at 17:11

## 2024-01-01 RX ADMIN — FUROSEMIDE 120 MG: 10 INJECTION, SOLUTION INTRAMUSCULAR; INTRAVENOUS at 05:44

## 2024-01-01 RX ADMIN — Medication 200 MCG/HR: at 18:58

## 2024-01-01 RX ADMIN — MINERAL OIL, AND WHITE PETROLATUM 1 APPLICATION: 425; 573 OINTMENT OPHTHALMIC at 05:15

## 2024-01-01 RX ADMIN — LABETALOL HYDROCHLORIDE 20 MG: 5 INJECTION, SOLUTION INTRAVENOUS at 15:59

## 2024-01-01 RX ADMIN — FAMOTIDINE 20 MG: 20 TABLET, FILM COATED ORAL at 05:39

## 2024-01-01 RX ADMIN — AMPICILLIN AND SULBACTAM 3 G: 1; 2 INJECTION, POWDER, FOR SOLUTION INTRAMUSCULAR; INTRAVENOUS at 17:13

## 2024-01-01 RX ADMIN — DEXTROSE MONOHYDRATE 50 ML: 25 INJECTION, SOLUTION INTRAVENOUS at 08:47

## 2024-01-01 RX ADMIN — AMPICILLIN AND SULBACTAM 3 G: 1; 2 INJECTION, POWDER, FOR SOLUTION INTRAMUSCULAR; INTRAVENOUS at 17:38

## 2024-01-01 RX ADMIN — IOHEXOL 75 ML: 350 INJECTION, SOLUTION INTRAVENOUS at 08:58

## 2024-01-01 RX ADMIN — INSULIN HUMAN 4 UNITS: 100 INJECTION, SOLUTION PARENTERAL at 02:55

## 2024-01-01 RX ADMIN — THIAMINE HYDROCHLORIDE 250 MG: 100 INJECTION, SOLUTION INTRAMUSCULAR; INTRAVENOUS at 18:05

## 2024-01-01 RX ADMIN — THIAMINE HYDROCHLORIDE 250 MG: 100 INJECTION, SOLUTION INTRAMUSCULAR; INTRAVENOUS at 05:23

## 2024-01-01 RX ADMIN — INSULIN HUMAN 8 UNITS: 100 INJECTION, SOLUTION PARENTERAL at 21:02

## 2024-01-01 RX ADMIN — LABETALOL HYDROCHLORIDE 20 MG: 5 INJECTION, SOLUTION INTRAVENOUS at 13:35

## 2024-01-01 RX ADMIN — PROPOFOL 50 MCG/KG/MIN: 10 INJECTION, EMULSION INTRAVENOUS at 05:13

## 2024-01-01 ASSESSMENT — PAIN DESCRIPTION - PAIN TYPE
TYPE: ACUTE PAIN

## 2024-01-01 ASSESSMENT — FIBROSIS 4 INDEX
FIB4 SCORE: 2.37
FIB4 SCORE: 8.68
FIB4 SCORE: 3.15
FIB4 SCORE: 18.08
FIB4 SCORE: 8.68
FIB4 SCORE: 35.6

## 2024-06-10 PROBLEM — J96.01 ACUTE RESPIRATORY FAILURE WITH HYPOXIA (HCC): Status: ACTIVE | Noted: 2024-01-01

## 2024-06-10 PROBLEM — I46.9 CARDIAC ARREST (HCC): Status: ACTIVE | Noted: 2024-01-01

## 2024-06-10 PROBLEM — R57.9 SHOCK (HCC): Status: ACTIVE | Noted: 2024-01-01

## 2024-06-10 PROBLEM — R79.89 ELEVATED LFTS: Status: ACTIVE | Noted: 2024-01-01

## 2024-06-10 PROBLEM — N17.9 ACUTE KIDNEY INJURY (HCC): Status: ACTIVE | Noted: 2024-01-01

## 2024-06-10 PROBLEM — G93.40 ACUTE ENCEPHALOPATHY: Status: ACTIVE | Noted: 2024-01-01

## 2024-06-10 PROBLEM — E87.20 LACTIC ACIDOSIS: Status: ACTIVE | Noted: 2024-01-01

## 2024-06-10 NOTE — ED NOTES
Assumed pt care. Additional IV established. Pt medicated per orders. Originally hypotensive on arrival. Pressures started. Hood inserted. Attempted NG/OG x3 with no success.Cardiology at seen patient

## 2024-06-10 NOTE — ED PROVIDER NOTES
"  ER Provider Note    Scribed for Pb Norman M.D. by Yesica Thorne. 6/10/2024   6:56 AM    Primary Care Provider: None noted    CHIEF COMPLAINT  Chief Complaint   Patient presents with    Cardiac Arrest     BIB EMS for cardiac arrest. Found by brother down in apartment. Brother started CPR PTA EMS arrival. Unknown downtime. Pt initially asystole per [facility]. Pt given 2 rounds of epi. ROSC initiated. Just PTA pt re arrested. Given 2 more doses of Epi.      Initial BGL 69. Given 100mL D10, BGL re check 115. Pt arrived w/ L I/o in place and CPR in progress.      EXTERNAL RECORDS REVIEWED  Review of old chart shows no previous ED visits.    HPI/ROS  LIMITATION TO HISTORY   Patient's condition  OUTSIDE HISTORIAN(S):  EMS    John Tipton is a 89 y.o. adult who presents to the ED via EMS CPR in progress. Per EMS, the patient was found down in his apartment by his brother this morning. EMS states the patient's brother started CPR and called EMS. No known medical history. HPI limited due to patient's condition.    PAST MEDICAL HISTORY  None noted    SURGICAL HISTORY  None noted    FAMILY HISTORY  None noted    SOCIAL HISTORY   None noted    CURRENT MEDICATIONS  None noted    ALLERGIES  None noted     PHYSICAL EXAM  /82   Pulse 100   Resp 26   Ht 1.727 m (5' 8\")   Wt 72.6 kg (160 lb)   SpO2 96%   BMI 24.33 kg/m²    Nursing note and vitals reviewed.  Constitutional: Critically ill, CPR in progress  HENT: Head is normocephalic and atraumatic. Oropharynx is clear and moist without exudate or erythema.   Eyes: Conjunctiva are normal. Pupils fixed at 4 mm  Cardiovascular: CPR in progress. V-fib  Pulmonary/Chest: Respiratory assisted by BVM  Abdominal: Soft. No distention    Musculoskeletal: Extremities with no deformity or evidence of trauma  Neurological: GCS of 3  Skin: Skin is warm and dry. No rash.     DIAGNOSTIC STUDIES    Labs:   Results for orders placed or performed during the hospital encounter of 06/10/24 "   Lactic Acid   Result Value Ref Range    Lactic Acid 13.1 (HH) 0.5 - 2.0 mmol/L   CBC with Differential   Result Value Ref Range    WBC 6.5 4.8 - 10.8 K/uL    RBC 4.48 (L) 4.70 - 6.10 M/uL    Hemoglobin 12.1 (L) 14.0 - 18.0 g/dL    Hematocrit 40.7 (L) 42.0 - 52.0 %    MCV 90.8 81.4 - 97.8 fL    MCH 27.0 27.0 - 33.0 pg    MCHC 29.7 (L) 32.3 - 36.5 g/dL    RDW 51.6 (H) 35.9 - 50.0 fL    Platelet Count 222 164 - 446 K/uL    MPV 9.7 9.0 - 12.9 fL    Neutrophils-Polys 77.40 (H) 44.00 - 72.00 %    Lymphocytes 15.40 (L) 22.00 - 41.00 %    Monocytes 6.10 0.00 - 13.40 %    Eosinophils 0.00 0.00 - 6.90 %    Basophils 0.20 0.00 - 1.80 %    Immature Granulocytes 0.90 0.00 - 0.90 %    Nucleated RBC 0.00 0.00 - 0.20 /100 WBC    Neutrophils (Absolute) 5.06 1.82 - 7.42 K/uL    Lymphs (Absolute) 1.01 1.00 - 4.80 K/uL    Monos (Absolute) 0.40 0.00 - 0.85 K/uL    Eos (Absolute) 0.00 0.00 - 0.51 K/uL    Baso (Absolute) 0.01 0.00 - 0.12 K/uL    Immature Granulocytes (abs) 0.06 0.00 - 0.11 K/uL    NRBC (Absolute) 0.00 K/uL    Anisocytosis 1+     Macrocytosis 1+    Complete Metabolic Panel   Result Value Ref Range    Sodium 143 135 - 145 mmol/L    Potassium 4.6 3.6 - 5.5 mmol/L    Chloride 100 96 - 112 mmol/L    Co2 15 (L) 20 - 33 mmol/L    Anion Gap 28.0 (H) 7.0 - 16.0    Glucose 48 (LL) 65 - 99 mg/dL    Bun 15 8 - 22 mg/dL    Creatinine 2.35 (H) 0.50 - 1.40 mg/dL    Calcium 7.4 (L) 8.5 - 10.5 mg/dL    Correct Calcium 8.1 (L) 8.5 - 10.5 mg/dL    AST(SGOT) 3716 (HH) 12 - 45 U/L    ALT(SGPT) 3400 (HH) 2 - 50 U/L    Alkaline Phosphatase 75 U/L    Total Bilirubin <0.2 0.1 - 1.5 mg/dL    Albumin 3.1 (L) 3.2 - 4.9 g/dL    Total Protein 5.2 (L) 6.0 - 8.2 g/dL    Globulin 2.1 1.9 - 3.5 g/dL    A-G Ratio 1.5 g/dL   Urinalysis    Specimen: Blood   Result Value Ref Range    Color Yellow     Character Clear     Specific Gravity 1.016 <1.035    Ph 5.5 5.0 - 8.0    Glucose 250 (A) Negative mg/dL    Ketones Negative Negative mg/dL    Protein 30 (A)  Negative mg/dL    Bilirubin Negative Negative    Urobilinogen, Urine 0.2 Negative    Nitrite Negative Negative    Leukocyte Esterase Negative Negative    Occult Blood Trace (A) Negative    Micro Urine Req Microscopic    URINE DRUG SCREEN   Result Value Ref Range    Amphetamines Urine Positive (A) Negative    Barbiturates Negative Negative    Benzodiazepines Negative Negative    Cocaine Metabolite Negative Negative    Fentanyl, Urine Positive (A) Negative    Methadone Negative Negative    Opiates Negative Negative    Oxycodone Negative Negative    Phencyclidine -Pcp Negative Negative    Propoxyphene Negative Negative    Cannabinoid Metab Positive (A) Negative   DIAGNOSTIC ALCOHOL   Result Value Ref Range    Diagnostic Alcohol <10.1 <10.1 mg/dL   TROPONIN   Result Value Ref Range    Troponin T 89 (H) 6 - 19 ng/L   URINE MICROSCOPIC (W/UA)   Result Value Ref Range    WBC 0-2 (A) /hpf    RBC 0-2 (A) /hpf    Bacteria Negative None /hpf    Epithelial Cells Few /hpf    Epithelial Cells Renal Few /hpf    Hyaline Cast 0-2 /lpf    Sperm Few /hpf   ESTIMATED GFR   Result Value Ref Range    GFR (CKD-EPI) 21 (A) >60 mL/min/1.73 m 2   PLATELET ESTIMATE   Result Value Ref Range    Plt Estimation Normal    MORPHOLOGY   Result Value Ref Range    RBC Morphology Present     Poikilocytosis 3+     Ovalocytes 1+     Echinocytes 3+     Acanthocytes 1+    PERIPHERAL SMEAR REVIEW   Result Value Ref Range    Peripheral Smear Review see below    DIFFERENTIAL COMMENT   Result Value Ref Range    Comments-Diff see below    EKG   Result Value Ref Range    Report       [facility] Emergency Dept.    Test Date:  2024-06-10  Pt Name:    DORIS SIXTY                 Department: ER  MRN:        [id number]                      Room:       [facility]  Gender:                                  Technician:  :        [id number]                   Requested By:SAVANNA ONTIVEROS  Order #:    [id number]                    Vicki  MD:    Measurements  Intervals                                Axis  Rate:       111                          P:          0  LA:         0                            QRS:        106  QRSD:       93                           T:          -45  QT:         366  QTc:        498    Interpretive Statements  Atrial fibrillation  Lateral infarct, old  Repol abnrm, severe global ischemia (LM/MVD)  No previous ECG available for comparison     POCT arterial blood gas device results   Result Value Ref Range    Ph 6.853 (LL) 7.400 - 7.500    Pco2 123.3 (HH) 26.0 - 37.0 mmHg    Po2 63 (L) 64 - 87 mmHg    Tco2 25 20 - 33 mmol/L    S02 67 (L) 93 - 99 %    Hco3 21.7 17.0 - 25.0 mmol/L    BE -14 (L) -4 - 3 mmol/L    Body Temp 96.5 F degrees    O2 Therapy 100 %    iPF Ratio 63     Ph Temp Gildardo 6.866 (LL) 7.400 - 7.500    Pco2 Temp Co 117.2 (HH) 26.0 - 37.0 mmHg    Po2 Temp Cor 58 (L) 64 - 87 mmHg    Specimen Arterial     Sin Test Pass     DelSys Vent     End Tidal Carbon Dioxide 40 mmhg    Tidal Volume 460 mL    Peep End Expiratory Pressure 8 cmh20    Set Rate 26     Mode APV-CMV    POCT sodium device results   Result Value Ref Range    Istat Sodium 139 135 - 145 mmol/L   POCT potassium device results   Result Value Ref Range    Istat Potassium 4.8 3.6 - 5.5 mmol/L   POCT ionized CA device results   Result Value Ref Range    Istat Ionized Calcium 0.99 (L) 1.10 - 1.30 mmol/L       EKG:   I have independently interpreted this EKG as detailed above.     Radiology:   This attending emergency physician has independently interpreted the diagnostic imaging associated with this visit and is awaiting the final reading from the radiologist.   Preliminary interpretation is a follows: CT chest shows no evidence of pulmonary embolism.  CT head shows no evidence of intracranial hemorrhage.    Radiologist interpretation:   CT-CTA CHEST PULMONARY ARTERY W/ RECONS   Final Result      1.  No central or segmental pulmonary embolus   2.  BILATERAL  dependent airspace disease airway fluid suspicious for aspiration pneumonia   3.  Hepatic steatosis   4.  Trace LEFT anterior chest wall gas of uncertain etiology            CT-HEAD W/O   Final Result      No acute intracranial abnormality.            DX-CHEST-PORTABLE (1 VIEW)   Final Result         1.  No acute cardiopulmonary disease.          Intubation Procedure    Indication: Respiratory failure, airway compromise, comatose state, and airway protection    Consent: Unable to be obtained due to the emergent nature of this procedure.    Medications Used: None    Procedure: The patient was placed in the appropriate position.  Cricoid pressure was utilized.  Intubation was performed by direct laryngoscopy using a laryngoscope and an 8.0 cuffed endotracheal tube.  The cuff was then inflated and the tube was secured appropriately at a distance of 24 cm to the dental ridge.  Initial confirmation of placement included bilateral breath sounds, an end tidal CO2 detector, absence of sounds over the stomach, tube fogging, and adequate chest rise.  A chest x-ray to verify correct placement of the tube showed appropriate tube position.    The patient tolerated the procedure well.     Complications: None    Defibrillation procedure    Indication: unstable ventricular fibrillation    Consent: Unable to be obtained due to the emergent nature of this procedure.    Pre-Medication: none    Procedure: The patient was placed in the supine position and the chest area was exposed. The cardioversion pads were applied in the standard manner and configuration.    Attempt #1: The defibrillator was set on the asynchronous mode and charged to 200 joules.  A charge was then delivered which resulted in no change in rhythm.    Attempt #2: The defibrillator was set on the asynchronous and charged to 200 joules.  A charge was then delivered which resulted in  conversion to normal sinus rhythm.    Attempt #3: Not necessary    The patient  tolerated the procedure well.    Complications: None    INITIAL ASSESSMENT AND PLAN    6:56 AM Called over to room by Nursing staff. CPR in progress. Ordered for CT-CTA Chest Pulmonary Artery, CT-Head without, Dx-chest, Troponin, UDS, Diagnostic Alcohol, Lactic Acid, CBC with diff, CMP, UA, Blood Culture and EKG to evaluate. The patient will be medicated with multiple doses of Epinephrine for John's symptoms. Dr. Lion, Cardiology will consult. Patient verbalizes understanding and support with my plan of care.  Differential diagnoses include but not limited to: intracranial, anoxic injury, STEMI, Cardiac arrhythmia, overdose. The patient's brother is here and was updated on the plan and status of the patient. The brother states the patient uses marijuana but does not report any other known drug use.    Overdose:   Sixty was here with a suspected overdose of Unknown substance or drug; John has no other known overdoses.       COURSE AND MEDICAL DECISION MAKING    Patient presents today in cardiac arrest.  Brought in by EMS.  Had 2 rounds of epinephrine in the field.  They achieved ROSC.  However then lost pulses.  Brought in undergoing CPR.  Respirations by the bag-valve-mask.  Intubated by me in the emergency department.  Continued ACLS.  Received ACLS medications including multiple doses of epinephrine and sodium bicarbonate.  Achieved ROSC.    Had pulses for a good period of time.  Patient was being initiated on epinephrine drip.  Ongoing workup was initiated.    Patient was doing well but then subsequently began to bradycardia down.  QRS widened out.  Once again lost pulses.  CPR was initiated.  ACLS protocols.  Administered bicarb and epinephrine.  Once again was able to attain ROSC.    Patient then subsequently did well.  Maxed out on epinephrine and Levophed.  Discussed the case with the intensivist and cardiology.  Being admitted to the ICU.    8:03 AM I discussed the patient's case and the above findings  with Dr. Lion, Cardiology would like a Ct PE study.    8:05 AM - The patient is significantly acidotic. Will administer additional Bicarbonate, calcium is low giving calcium chloride.    9:01 AM - I discussed the patient's case and the above findings with Dr. Gross (Intensivist) who will consult on the patient for hospitalization.    9:16 AM - Patient is positive for Fentanyl and Methamphetamines.  Patient is outside the window of when fentanyl could be reversed with Narcan.  I suspect this was a fentanyl overdose.      CRITICAL CARE  The very real possibilty of a deterioration of this patient's condition required the highest level of my preparedness for sudden, emergent intervention.  I provided critical care services, which included medication orders, frequent reevaluations of the patient's condition and response to treatment, ordering and reviewing test results, and discussing the case with various consultants.  The critical care time associated with the care of the patient was 35 minutes. Review chart for interventions. This time is exclusive of any other billable procedures.     DISPOSITION AND DISCUSSIONS    I have discussed management of the patient with the following physicians and CADEN's:  Dr. Lion, Cardiology & Dr. Gross (Intensivist)    DISPOSITION:  Patient will be hospitalized by Dr. Gorss (Intensivist) in critical condition.    FINAL DIAGNOSIS  1. Cardiac arrest (HCC)    2. Cardiopulmonary arrest (HCC)    3. Lactic acidosis    Intubation by ERP  Defibrillation by ERP  CPR 2 separate incidents     Yesica FOREMAN (Scribe), am scribing for, and in the presence of, Pb Norman M.D..    Electronically signed by: Yesica Thorne (Scribe), 6/10/2024    Pb FOREMAN M.D. personally performed the services described in this documentation, as scribed by Yesica Thorne in my presence, and it is both accurate and complete.      The note accurately reflects work and decisions made by me.  Pb Norman  M.D.  6/10/2024  1:03 PM

## 2024-06-10 NOTE — PROCEDURES
Arterial Catheter Procedure Note    Date: 6/10/2024    Procedure:  Arterial Catheter Insertion    Indication: shock, cardiac arrest    Physician:  Dr. Pedrito Gross MD    Consent:  Emergent    Procedure:  The patient is intubated and on full mechanical vent support after cardiac arrest.  Arterial catheter is indicated for continuous invasive BP monitoring to titrate vasoactive agents.  The right wrist was prepped and draped using sterile barrier precautions.  Using ultrasound guidance, a 20-gauge, 12 cm arterial catheter was placed in the right radial artery on the first attempt without difficulty.  A good quality arterial waveform was noted on monitor.  The catheter ws sutured in place and a sterile dressing was applied.  No immediate complications.  EBL < 5 cc.

## 2024-06-10 NOTE — ASSESSMENT & PLAN NOTE
Resolved   Secondary to cardiac arrest  S/p IVF resuscitation and bicarbonate  Trend lactic acid level

## 2024-06-10 NOTE — DISCHARGE PLANNING
SW was handed off information that pts brother was in family room, SW consulted with RN and got approval to bring pts brother to bedside.  SW looked for pts brother in family room, pts brother was not there.  MAINOR called and left vm for pts brother providing SW direct line for updates or assistance to get to pts room.    Update 8:25AM: SW escorted pts brother to pts room.  Pt was just taken to CT.

## 2024-06-10 NOTE — ED NOTES
PT's med rec is complete per Father, Nagi  ([phone]) reporting over the phone, [facility] from [facility], and PT's brother, Nagi CALLEJAS ([phone]) at bedside.    Allergies were not reviewed.     Medications were from 05/14/2024 given at a [facility]. It is unknown if PT was discharged on or has taken any medications after discharge.    Pharmacy for this visit - [facility] ([phone])

## 2024-06-10 NOTE — ED TRIAGE NOTES
Chief Complaint   Patient presents with    Cardiac Arrest     [facility] EMS for cardiac arrest. Found by brother down in apartment. Brother started CPR PTA EMS arrival. Unknown downtime. Pt initially asystole per [facility]. Pt given 2 rounds of epi. ROSC initiated. Just PTA pt re arrested. Given 2 more doses of Epi.      Initial BGL 69. Given 100mL D10, BGL re check 115. Pt arrived w/ L I/o in place and CPR in progress.      [facility] ems for above.

## 2024-06-10 NOTE — ASSESSMENT & PLAN NOTE
Out of hospital cardiac arrest and intubated on 6/10/2024  Cont full ventilator support  RT/O2 protocols  Ventilator bundle protocols  I am actively adjusting ventilator settings based on ABGs/vent mechanics  S/p bronchoscopy with BAL on 6/10, empiric Unasyn x 5 days, all cultures negative  S/p bronchoscopy with BAL on 6/15: negative cultures  6/16- Repeat BAL for plugging and hypoxemia, dense secretions clears LLL, restarted unasyn    Not responding to lasix

## 2024-06-10 NOTE — ASSESSMENT & PLAN NOTE
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

## 2024-06-10 NOTE — PROGRESS NOTES
4 Eyes Skin Assessment Completed by OMAYRA Mercedes and OMAYRA Wilson.    Head WDL  Ears WDL  Nose WDL  Mouth WDL, braces  Neck WDL  Breast/Chest WDL  Shoulder Blades WDL  Spine Redness and Blanching, nonblanching spot on midspine, nonblanching spot on left rib cage   (R) Arm/Elbow/Hand WDL  (L) Arm/Elbow/Hand WDL  Abdomen WDL  Groin WDL  Scrotum/Coccyx/Buttocks WDL  (R) Leg Scar and Scab  (L) Leg Scar and Scab  (R) Heel/Foot/Toe Edema, dry, calloused, flaky   (L) Heel/Foot/Toe Edema, drya, calloused, flaky           Devices In Places ECG, Blood Pressure Cuff, Pulse Ox, Hood, Arterial Line, SCD's, ET Tube, OG/NG, Central Line, and BMS      Interventions In Place Heel Mepilex, Sacral Mepilex, Heel Float Boots, TAP System, Q2 Turns, Low Air Loss Mattress, Barrier Cream, and ZFlo Pillow    Possible Skin Injury Yes    Pictures Uploaded Into Epic Yes  Wound Consult Placed Yes  RN Wound Prevention Protocol Ordered Yes

## 2024-06-10 NOTE — PROCEDURES
"Date of Service: 6/10/2024    Procedure:  Diagnostic and therapeutic bronchoscopy with BAL    Indication: Respiratory failure, ? pneumonia    Physician:  Dr. Pedrito Gross MD    Post Procedure Diagnosis:  1.  Mildly inflamed airways  2.  Moderate to large amount of creamy, purulent secretions throughout the airways  3.  Therapeutic airway lavage RLL and LLL airway segments  4.  BAL from LLL sent for culture    Narrative:  Appropriate consent was obtained and \"time out\" was performed.  A flexible fiberoptic bronchoscope was then inserted through the ETT without difficulty.  All airways were evaluated to the sub-segmental level.  The airway mucosa was mildly inflamed.  There was a copious amount of thick, creamy, purulent secretions throughout all airways.  Multiple airway segments including the RLL and LLL airways were therapeutically lavaged with small aliquots of saline.  No endobronchial lesions were seen.  The bronchoscope was then wedged in a segment of the LLL bronchus.  40 cc of saline was instilled with moderate return of purulent, pea green-colored BAL fluid.  The BAL specimen will be sent for appropriate culture.  No immediate complications.  EBL = 0.      Pedrito Gross M.D.      "

## 2024-06-10 NOTE — CONSULTS
Critical Care Consultation    Date of consult: 6/10/2024    Referring Physician  Pb Norman M.D.    Reason for Consultation  Suspected fentanyl overdose, out-of-hospital cardiac arrest    History of Presenting Illness  39 y.o. M, no know PMH who presented 6/10/2024 via EMS with CPR in progress.  He lives in an apartment with his brother and uses alcohol marijuana.  This morning brother found him unresponsive with agonal respirations.  He started CPR and called EMS.  EMS found patient in asystole performed 2 rounds of CPR with epinephrine and got ROSC and subsequently lost pulses again and he arrived to the ED with CPR in progress.  Initial blood glucose 69 and received D10.  Workup in ED included negative CTA chest with no evidence of pulmonary embolism, CT head negative, EKG with junctional rhythm, UDS positive for amphetamines, cannabinoids and fentanyl.  Lactate 13.1, LFTs were elevated and creatinine was 2.35. He has remained unresponsive, triggering the vent and has intermittent cough with fasciculations over the masseter muscles.  He has received multiple doses of bicarbonate with ongoing acidemia.    Code Status  Full Code    Review of Systems  Review of Systems   Unable to perform ROS: Acuity of condition       Past Medical History   has no past medical history on file.    Surgical History   has no past surgical history on file.    Family History  family history is not on file.    Social History       Medications  Home Medications       Reviewed by Renée García (Pharmacy Tech) on 06/10/24 at 0859  Med List Status: Complete     Medication Last Dose Status   buPROPion (WELLBUTRIN XL) 150 MG XL tablet UNK Active   OLANZapine (ZYPREXA) 5 MG Tab UNK Active   QUEtiapine (SEROQUEL) 100 MG Tab UNK Active                  Audit from Redirected Encounters    **Home medications have not yet been reviewed for this encounter**       Current Facility-Administered Medications   Medication Dose Route  Frequency Provider Last Rate Last Admin    lactated ringers infusion   Intravenous Continuous Pedrito Gross M.D. 83 mL/hr at 06/10/24 1136 New Bag at 06/10/24 1136    [START ON 6/11/2024] enoxaparin (Lovenox) inj 40 mg  40 mg Subcutaneous DAILY AT 1800 Pedrito Gross M.D.        ondansetron (Zofran) syringe/vial injection 4 mg  4 mg Intravenous Q4HRS PRN Pedrito Gross M.D.        Respiratory Therapy Consult   Nebulization Continuous RT Pedrito Gross M.D.        famotidine (Pepcid) tablet 20 mg  20 mg Enteral Tube Q24HRS Pedrito Gross M.D.        Or    famotidine (Pepcid) injection 20 mg  20 mg Intravenous Q24HRS Pedrito Gross M.D.        senna-docusate (Pericolace Or Senokot S) 8.6-50 MG per tablet 2 Tablet  2 Tablet Enteral Tube BID Pedrito Gross M.D.        And    polyethylene glycol/lytes (Miralax) Packet 1 Packet  1 Packet Enteral Tube QDAY PRN Pedrito Gross M.D.        And    magnesium hydroxide (Milk Of Magnesia) suspension 30 mL  30 mL Enteral Tube QDAY PRN Pedrito Gross M.D.        And    bisacodyl (Dulcolax) suppository 10 mg  10 mg Rectal QDAY PRN Pedrito Gross M.D.        lidocaine (Xylocaine) 1 % injection 2 mL  2 mL Tracheal Tube Q30 MIN PRN Pderito Gross M.D.        Pharmacy Consult: Enteral tube insertion - review meds/change route/product selection  1 Each Other PHARMACY TO DOSE Pedrito Gross M.D.        propofol (DIPRIVAN) injection  0-80 mcg/kg/min Intravenous Continuous Pedrito Gross M.D.        HYDROmorphone (Dilaudid) injection 0.5-1 mg  0.5-1 mg Intravenous Q HOUR PRN Pedrito Gross M.D.        midazolam (Versed) injection 1-5 mg  1-5 mg Intravenous Q HOUR PRN Pedrito Gross M.D.        ipratropium-albuterol (DUONEB) nebulizer solution  3 mL Nebulization Q2HRS PRN (RT) Pedrito Gross M.D.        acetaminophen (Tylenol) tablet 1,000 mg  1,000 mg Enteral Tube Q6HRS Pedrito Gross M.D.        Or    acetaminophen (Tylenol) suppository 975 mg  975 mg Rectal Q6HRS Pedrito GUERRA  ADRIAN Gross        magnesium sulfate 20 g/500mL infusion  0.5-2 g/hr Intravenous Continuous Pedrito Gross M.D.   Held at 06/10/24 1100    norepinephrine (Levophed) 8 mg in 250 mL NS infusion (premix)  0-1 mcg/kg/min Intravenous Continuous Pedrito Gross M.D. 13.6 mL/hr at 06/10/24 1146 0.1 mcg/kg/min at 06/10/24 1146    EPINEPHrine (Adrenalin) infusion 4 mg/250 mL (premix)  0-0.5 mcg/kg/min Intravenous Continuous Pedrito Gross M.D. 54.5 mL/hr at 06/10/24 1217 0.2 mcg/kg/min at 06/10/24 1217    ondansetron (Zofran ODT) dispertab 4 mg  4 mg Enteral Tube Q4HRS PRN Pedrito Gross M.D.        insulin regular (HumuLIN R,NovoLIN R) injection  1-6 Units Subcutaneous Q6HRS Pedrito Gross M.D.        And    dextrose 10 % BOLUS 25 g  25 g Intravenous Q15 MIN PRN Pedrito Gross M.D.        Rally Bag IV (D5LR 1000 mL + Thiamine 100 mg + Folic Acid 1 mg + Magnesium Sulfate 1 g) infusion   Intravenous Once Pedrito Gross M.D.           Allergies  Not on File    Vital Signs last 24 hours  Temp:  [33.7 °C (92.7 °F)-35.8 °C (96.5 °F)] 33.7 °C (92.7 °F)  Pulse:  [] 119  Resp:  [10-41] 29  BP: ()/() 171/71  SpO2:  [92 %-100 %] 100 %    Physical Exam  Physical Exam  Vitals and nursing note reviewed.   Constitutional:       Comments: Intubated and sedated postarrest, epinephrine and norepinephrine infusions, unresponsive   HENT:      Head: Normocephalic and atraumatic.      Nose: Nose normal.      Mouth/Throat:      Mouth: Mucous membranes are moist.   Eyes:      Comments: Pupils sluggishly reactive   Cardiovascular:      Rate and Rhythm: Regular rhythm. Tachycardia present.      Pulses: Normal pulses.      Heart sounds: No murmur heard.  Pulmonary:      Comments: Full ventilator support, scattered coarse breath sounds, no wheeze  Abdominal:      General: There is no distension.      Palpations: Abdomen is soft.      Tenderness: There is no abdominal tenderness.   Musculoskeletal:         General: No swelling  or deformity.      Cervical back: Neck supple.   Skin:     General: Skin is warm and dry.      Capillary Refill: Capillary refill takes less than 2 seconds.   Neurological:      Comments: Some fasciculations of the jaw over masseter muscles, unresponsive to verbal or painful stimulus, does trigger the ventilator, does not withdrawal         Fluids    Intake/Output Summary (Last 24 hours) at 6/10/2024 1349  Last data filed at 6/10/2024 1219  Gross per 24 hour   Intake 77.24 ml   Output --   Net 77.24 ml       Laboratory  Recent Results (from the past 48 hour(s))   EKG    Collection Time: 06/10/24  6:58 AM   Result Value Ref Range    Report       [facility] Emergency Dept.    Test Date:  2024-06-10  Pt Name:    DORIS SIXTY                 Department: ER  MRN:        [id number]                      Room:       [facility]  Gender:                                  Technician:  :        1900                   Requested By:SAVANNA ONTIVEROS  Order #:    [id number]                    Reading MD: SAVANNA ONTIVEROS MD    Measurements  Intervals                                Axis  Rate:       111                          P:          0  OH:         0                            QRS:        106  QRSD:       93                           T:          -45  QT:         366  QTc:        498    Interpretive Statements  narrow complex tachycardia  Lateral infarct, old  Repol abnrm, severe global ischemia (LM/MVD)  No previous ECG available for comparison  Electronically Signed On 06- 08:46:18 PDT by SAVANNA ONTIVEROS MD     POCT arterial blood gas device results    Collection Time: 06/10/24  7:26 AM   Result Value Ref Range    Ph 6.853 (LL) 7.400 - 7.500    Pco2 123.3 (HH) 26.0 - 37.0 mmHg    Po2 63 (L) 64 - 87 mmHg    Tco2 25 20 - 33 mmol/L    S02 67 (L) 93 - 99 %    Hco3 21.7 17.0 - 25.0 mmol/L    BE -14 (L) -4 - 3 mmol/L    Body Temp 96.5 F degrees    O2 Therapy 100 %    iPF Ratio 63     Ph Temp Gildardo 6.866  (LL) 7.400 - 7.500    Pco2 Temp Co 117.2 (HH) 26.0 - 37.0 mmHg    Po2 Temp Cor 58 (L) 64 - 87 mmHg    Specimen Arterial     Sin Test Pass     DelSys Vent     End Tidal Carbon Dioxide 40 mmhg    Tidal Volume 460 mL    Peep End Expiratory Pressure 8 cmh20    Set Rate 26     Mode APV-CMV    POCT sodium device results    Collection Time: 06/10/24  7:26 AM   Result Value Ref Range    Istat Sodium 139 135 - 145 mmol/L   POCT potassium device results    Collection Time: 06/10/24  7:26 AM   Result Value Ref Range    Istat Potassium 4.8 3.6 - 5.5 mmol/L   POCT ionized CA device results    Collection Time: 06/10/24  7:26 AM   Result Value Ref Range    Istat Ionized Calcium 0.99 (L) 1.10 - 1.30 mmol/L   Lactic Acid    Collection Time: 06/10/24  7:29 AM   Result Value Ref Range    Lactic Acid 13.1 (HH) 0.5 - 2.0 mmol/L   CBC with Differential    Collection Time: 06/10/24  7:29 AM   Result Value Ref Range    WBC 6.5 4.8 - 10.8 K/uL    RBC 4.48 (L) 4.70 - 6.10 M/uL    Hemoglobin 12.1 (L) 14.0 - 18.0 g/dL    Hematocrit 40.7 (L) 42.0 - 52.0 %    MCV 90.8 81.4 - 97.8 fL    MCH 27.0 27.0 - 33.0 pg    MCHC 29.7 (L) 32.3 - 36.5 g/dL    RDW 51.6 (H) 35.9 - 50.0 fL    Platelet Count 222 164 - 446 K/uL    MPV 9.7 9.0 - 12.9 fL    Neutrophils-Polys 77.40 (H) 44.00 - 72.00 %    Lymphocytes 15.40 (L) 22.00 - 41.00 %    Monocytes 6.10 0.00 - 13.40 %    Eosinophils 0.00 0.00 - 6.90 %    Basophils 0.20 0.00 - 1.80 %    Immature Granulocytes 0.90 0.00 - 0.90 %    Nucleated RBC 0.00 0.00 - 0.20 /100 WBC    Neutrophils (Absolute) 5.06 1.82 - 7.42 K/uL    Lymphs (Absolute) 1.01 1.00 - 4.80 K/uL    Monos (Absolute) 0.40 0.00 - 0.85 K/uL    Eos (Absolute) 0.00 0.00 - 0.51 K/uL    Baso (Absolute) 0.01 0.00 - 0.12 K/uL    Immature Granulocytes (abs) 0.06 0.00 - 0.11 K/uL    NRBC (Absolute) 0.00 K/uL    Anisocytosis 1+     Macrocytosis 1+    Complete Metabolic Panel    Collection Time: 06/10/24  7:29 AM   Result Value Ref Range    Sodium 143 135 - 145  mmol/L    Potassium 4.6 3.6 - 5.5 mmol/L    Chloride 100 96 - 112 mmol/L    Co2 15 (L) 20 - 33 mmol/L    Anion Gap 28.0 (H) 7.0 - 16.0    Glucose 48 (LL) 65 - 99 mg/dL    Bun 15 8 - 22 mg/dL    Creatinine 2.35 (H) 0.50 - 1.40 mg/dL    Calcium 7.4 (L) 8.5 - 10.5 mg/dL    Correct Calcium 8.1 (L) 8.5 - 10.5 mg/dL    AST(SGOT) 3716 (HH) 12 - 45 U/L    ALT(SGPT) 3400 (HH) 2 - 50 U/L    Alkaline Phosphatase 75 30 - 99 U/L    Total Bilirubin <0.2 0.1 - 1.5 mg/dL    Albumin 3.1 (L) 3.2 - 4.9 g/dL    Total Protein 5.2 (L) 6.0 - 8.2 g/dL    Globulin 2.1 1.9 - 3.5 g/dL    A-G Ratio 1.5 g/dL   DIAGNOSTIC ALCOHOL    Collection Time: 06/10/24  7:29 AM   Result Value Ref Range    Diagnostic Alcohol <10.1 <10.1 mg/dL   TROPONIN    Collection Time: 06/10/24  7:29 AM   Result Value Ref Range    Troponin T 89 (H) 6 - 19 ng/L   ESTIMATED GFR    Collection Time: 06/10/24  7:29 AM   Result Value Ref Range    GFR (CKD-EPI) 21 (A) >60 mL/min/1.73 m 2   PLATELET ESTIMATE    Collection Time: 06/10/24  7:29 AM   Result Value Ref Range    Plt Estimation Normal    MORPHOLOGY    Collection Time: 06/10/24  7:29 AM   Result Value Ref Range    RBC Morphology Present     Poikilocytosis 3+     Ovalocytes 1+     Echinocytes 3+     Acanthocytes 1+    PERIPHERAL SMEAR REVIEW    Collection Time: 06/10/24  7:29 AM   Result Value Ref Range    Peripheral Smear Review see below    DIFFERENTIAL COMMENT    Collection Time: 06/10/24  7:29 AM   Result Value Ref Range    Comments-Diff see below    CRP Quantitive (Non-Cardiac)    Collection Time: 06/10/24  7:29 AM   Result Value Ref Range    Stat C-Reactive Protein 0.69 0.00 - 0.75 mg/dL   Prealbumin    Collection Time: 06/10/24  7:29 AM   Result Value Ref Range    Pre-Albumin 12.3 (L) 18.0 - 38.0 mg/dL   Triglycerides Starting now and then Every 3 Days    Collection Time: 06/10/24  7:29 AM   Result Value Ref Range    Triglycerides 66 0 - 149 mg/dL   MAGNESIUM    Collection Time: 06/10/24  7:29 AM   Result Value  Ref Range    Magnesium 3.4 (H) 1.5 - 2.5 mg/dL   Salicylate    Collection Time: 06/10/24  7:29 AM   Result Value Ref Range    Salicylates, Quant. 1.0 (L) 15.0 - 25.0 mg/dL   ACETAMINOPHEN    Collection Time: 06/10/24  7:29 AM   Result Value Ref Range    Acetaminophen -Tylenol <5.0 (L) 10.0 - 30.0 ug/mL   Urinalysis    Collection Time: 06/10/24  7:55 AM    Specimen: Blood   Result Value Ref Range    Color Yellow     Character Clear     Specific Gravity 1.016 <1.035    Ph 5.5 5.0 - 8.0    Glucose 250 (A) Negative mg/dL    Ketones Negative Negative mg/dL    Protein 30 (A) Negative mg/dL    Bilirubin Negative Negative    Urobilinogen, Urine 0.2 Negative    Nitrite Negative Negative    Leukocyte Esterase Negative Negative    Occult Blood Trace (A) Negative    Micro Urine Req Microscopic    URINE DRUG SCREEN    Collection Time: 06/10/24  7:55 AM   Result Value Ref Range    Amphetamines Urine Positive (A) Negative    Barbiturates Negative Negative    Benzodiazepines Negative Negative    Cocaine Metabolite Negative Negative    Fentanyl, Urine Positive (A) Negative    Methadone Negative Negative    Opiates Negative Negative    Oxycodone Negative Negative    Phencyclidine -Pcp Negative Negative    Propoxyphene Negative Negative    Cannabinoid Metab Positive (A) Negative   URINE MICROSCOPIC (W/UA)    Collection Time: 06/10/24  7:55 AM   Result Value Ref Range    WBC 0-2 (A) /hpf    RBC 0-2 (A) /hpf    Bacteria Negative None /hpf    Epithelial Cells Few /hpf    Epithelial Cells Renal Few /hpf    Hyaline Cast 0-2 /lpf    Sperm Few /hpf   POCT glucose device results    Collection Time: 06/10/24  9:03 AM   Result Value Ref Range    POC Glucose, Blood 150 (H) 65 - 99 mg/dL   Lactic Acid    Collection Time: 06/10/24  9:44 AM   Result Value Ref Range    Lactic Acid 12.7 (HH) 0.5 - 2.0 mmol/L   POCT arterial blood gas device results    Collection Time: 06/10/24 10:15 AM   Result Value Ref Range    Ph 7.017 (LL) 7.400 - 7.500    Pco2  60.9 (HH) 26.0 - 37.0 mmHg    Po2 106 (H) 64 - 87 mmHg    Tco2 17 (L) 20 - 33 mmol/L    S02 94 93 - 99 %    Hco3 15.6 (L) 17.0 - 25.0 mmol/L    BE -16 (L) -4 - 3 mmol/L    Body Temp see below degrees    O2 Therapy 60 %    iPF Ratio 177     Specimen Arterial     DelSys Vent     Tidal Volume 460 mL    Peep End Expiratory Pressure 8 cmh20    Set Rate 30     Mode APV-CMV    POCT glucose device results    Collection Time: 06/10/24 11:39 AM   Result Value Ref Range    POC Glucose, Blood 158 (H) 65 - 99 mg/dL   Lactic Acid    Collection Time: 06/10/24 12:40 PM   Result Value Ref Range    Lactic Acid 13.3 (HH) 0.5 - 2.0 mmol/L   Basic Metabolic Panel    Collection Time: 06/10/24 12:40 PM   Result Value Ref Range    Sodium 139 135 - 145 mmol/L    Potassium 3.5 (L) 3.6 - 5.5 mmol/L    Chloride 99 96 - 112 mmol/L    Co2 9 (LL) 20 - 33 mmol/L    Glucose 183 (H) 65 - 99 mg/dL    Bun 19 8 - 22 mg/dL    Creatinine 2.29 (H) 0.50 - 1.40 mg/dL    Calcium 7.3 (L) 8.5 - 10.5 mg/dL    Anion Gap 31.0 (H) 7.0 - 16.0   IONIZED CALCIUM    Collection Time: 06/10/24 12:40 PM   Result Value Ref Range    Ionized Calcium 1.0 (L) 1.1 - 1.3 mmol/L   ESTIMATED GFR    Collection Time: 06/10/24 12:40 PM   Result Value Ref Range    GFR (CKD-EPI) 21 (A) >60 mL/min/1.73 m 2   EKG    Collection Time: 06/10/24  1:06 PM   Result Value Ref Range    Report       [facility]    Test Date:  2024-06-10  Pt Name:    DORIS SIXTY                 Department: ER  MRN:        [id number]                      Room:       T624  Gender:     Male                         Technician: GABRIEL  :        1900                   Requested By:CRISTOBAL YEPEZ  Order #:    [id number]                    Reading MD:    Measurements  Intervals                                Axis  Rate:       123                          P:          0  VA:         0                            QRS:        90  QRSD:       103                          T:          54  QT:         324  QTc:         464    Interpretive Statements  Atrial fibrillation  Borderline right axis deviation  Anteroseptal infarct, old  Compared to ECG 06/10/2024 06:58:32  Narrow-QRS tachycardia no longer present  Early repolarization no longer present  Possible ischemia no longer present  Myocardial infarct finding still present     POCT arterial blood gas device results    Collection Time: 06/10/24  1:13 PM   Result Value Ref Range    Ph 7.219 (LL) 7.400 - 7.500    Pco2 27.4 26.0 - 37.0 mmHg    Po2 97 (H) 64 - 87 mmHg    Tco2 12 (L) 20 - 33 mmol/L    S02 96 93 - 99 %    Hco3 11.2 (L) 17.0 - 25.0 mmol/L    BE -15 (L) -4 - 3 mmol/L    Body Temp 35.5 C degrees    O2 Therapy 50 %    iPF Ratio 194     Ph Temp Gildardo 7.240 (LL) 7.400 - 7.500    Pco2 Temp Co 25.7 (L) 26.0 - 37.0 mmHg    Po2 Temp Cor 89 (H) 64 - 87 mmHg    Specimen Arterial     Sin Test N/A     DelSys Vent     End Tidal Carbon Dioxide 20 mmhg    Tidal Volume 570 mL    Peep End Expiratory Pressure 8 cmh20    Set Rate 32     Mode APV-CMV    POCT lactate device results    Collection Time: 06/10/24  1:13 PM   Result Value Ref Range    iStat Lactate 11.5 (HH) 0.5 - 2.0 mmol/L       Imaging  DX-CHEST-PORTABLE (1 VIEW)   Final Result      1.  Interval placement of left internal jugular central venous catheter.   2.  Interval placement of enteric sump tube.   3.  No evidence of procedure-related pneumothorax.      DX-ABDOMEN FOR TUBE PLACEMENT   Final Result      Enteric tube tip projects over the upper stomach      CT-CTA CHEST PULMONARY ARTERY W/ RECONS   Final Result      1.  No central or segmental pulmonary embolus   2.  BILATERAL dependent airspace disease airway fluid suspicious for aspiration pneumonia   3.  Hepatic steatosis   4.  Trace LEFT anterior chest wall gas of uncertain etiology            CT-HEAD W/O   Final Result      No acute intracranial abnormality.            DX-CHEST-PORTABLE (1 VIEW)   Final Result         1.  No acute cardiopulmonary disease.       EC-ECHOCARDIOGRAM COMPLETE W/O CONT    (Results Pending)       Assessment/Plan  * Cardiac arrest (HCC)- (present on admission)  Assessment & Plan  Found unresponsive at home, asystole  Suspected fentanyl overdose with UDS + for amphetamines, fentanyl, cannabinoids  No acute ischemic change on EKG, CTA negative for PE, head CT unremarkable  Continue postarrest care with normothermia protocol, maintain euvolemia, lung protective ventilation, eucapnia, euglycemia  Formal echocardiogram ordered, bedside POCUS with preserved biventricular function, not collapsible IVC    Shock (HCC)  Assessment & Plan  Undifferentiated shock post asystolic cardiac arrest  Titrated vasopressors as needed  POCUS with reduced biventricular function, formal echocardiogram pending  No evidence of PE, tamponade or acute ischemic change    Acute encephalopathy  Assessment & Plan  Suspicion for anoxic encephalopathy due to out of hospital cardiac arrest with unknown downtime  Toxic encephalopathy due to drug ingestion remains in differential  Continue supportive care, normothermia protocol  Initial head CT unremarkable  Stat EEG  Follow-up NSE and further prognostication after 72 hours  Likely MRI brain as appropriate interval    Lactic acidosis  Assessment & Plan  Secondary to cardiac arrest  Additional crystalloid, bicarbonate, trend lactic acid level    Elevated LFTs  Assessment & Plan  Suspect ischemic liver injury from cardiac arrest  Follow LFTs, bilirubin, further imaging/US if not trending down as appropriate    Acute kidney injury (HCC)  Assessment & Plan  Suspect component of ATN from cardiac arrest  Limit nephrotoxins, follow renal function, electrolytes urine output, monitor need for RRT    Acute respiratory failure with hypoxia (HCC)  Assessment & Plan  Out of hospital cardiac arrest 6/10/2024  Full ventilator support, LTV/LPS, liberate when clinically appropriate  Bronchoscopy for suspected aspiration with airspace opacities on  CT  Bronch with copious purulent secretions - started on empiric Unasyn; f/u cx's        Discussed patient condition and risk of morbidity and/or mortality with Family, RN, RT, Pharmacy, and ERP .    The patient remains critically ill.  Critical care time = 200 minutes in directly providing and coordinating critical care and extensive data review.  No time overlap and excludes procedures.

## 2024-06-10 NOTE — DISCHARGE PLANNING
Medical Social Work    Referral: Cardiac Arrest    Intervention: Pt is a 39 year old male brought in by KRISTINE from home due to Cardiac Arrest.  Pt is Luis Alfredo Mora (: 1985).  Pt's brother, Nagi ([phone]) arrived and was escorted to the Family Room.  Pt's brother states that pt was sleeping well and when he went to wake pt up he was unresponsive and he started CPR and called 911.  Pt's brother states that pt only uses marijuana and has no further medical history on pt.  Emotional support provided and ERP updated family in the Family Room.  Report given to MAINOR Gracia for follow up.      Plan: MAINOR will follow.

## 2024-06-10 NOTE — CONSULTS
Cardiology consultation    Requesting physician  Dr. Norman of the primary emergency department service    Indication for consultation: Cardiac arrest with respiratory failure    HPI  Late 30-year-old -American male/black male presented after having what his brother describes as agonal breathing.  His name is Luis Alfredo Mora and his brother Nagi by same last name can be reached at [phone].  His brother Nagi is providing history.  He notes that his brother had been in his usual state of health yesterday and had recently arrived from unknown location approximately 2 weeks ago after undergoing a bus ride of unknown duration.  He had been feeling well not having any adverse cardiovascular complaints.  He has no past medical history that his brother is currently aware of.  There is no family history of sudden cardiac death.  He does note that his brother does utilize marijuana and alcohol intermittently however did not utilize within the last 24 hours.  He states that he was feeling well on 6/9/2024 and ultimately went to sleep and at approximately 5:45 in the morning he looked at his brother who was having some difficulty with breathing.  He does note that his brother had some skipped breathing on 6/9/2024.    Past medical history: None    Past surgical history: Unknown    Social history: Single.  No children.  Noted marijuana and alcohol use.    Past family history: No acute cardiovascular disease in the mother and father per his brother's recollection    Medications: None    Supplements: None    Allergies: Unknown    Review of systems: Not able to be obtained at this time    Physical examination: Patient is currently intubated and sedated and unable to provide history.  Pupils are dilated.  Lungs are rhonchorous mid to the bibasilar in terms of breath sounds.  He is currently intubated with ET tube taped at the lips.  Heart rate is regular with intermittent ventricular ectopy and significant RV heave.   2 out of 6 apical systolic murmur with radiation of the left axilla abdomen is soft with good bowel sounds negative for lower extremity edema    EKG demonstrated on 6/10/2024 at approximately 7 AM junctional rhythm with repolarization abnormalities.    Echocardiogram pending    CT PE protocol pending    Assessment/plan  1.  Cardiac arrest status post CPR bystander as well as EMS bay CPR with return of spontaneous circulation  2.  Marijuana use  3.  Alcohol use    Clinically, patient is in critical condition and remains intubated.  At this time the etiology is not entirely clear for his cardiac arrest which remain represent a hypoxic arrest due to possibly pulmonary embolism given clinical findings.  EKG does not suggest S1 Q3, T3.  I would recommend that he undergo a CT PE protocol given that he did have a long bus ride approximately 2 weeks ago as well as a urine drug screen.  We will check an echocardiogram to assess for depressed LV systolic function.  At this time we will monitor his overall clinical status and make further recommendations pending cardiovascular workup.    Case was discussed with Dr. Norman of the emergency department.    Critical care time: 60 minutes was utilized in direct face-to-face patient contact, discussion with family and procuring information from family, chart review, chart preparation, laboratory review, imaging review, EKG review of

## 2024-06-10 NOTE — ASSESSMENT & PLAN NOTE
Suspect ischemic hepatopathy due to cardiac arrest  Stable  Hepatitis panel was negative  No coagulopathy, synthetic function appears preserved

## 2024-06-10 NOTE — ASSESSMENT & PLAN NOTE
Undifferentiated shock post asystolic cardiac arrest   Initial TTE with depressed BiV function, now improved  Off vasoactives    Continue MAP goals > 65

## 2024-06-10 NOTE — PROCEDURES
"Central venous catheter insertion    Date: 6/10/2024    Procedure: Central Venous Catheter Insertion    Indication: shock, cardiac arrest    Physician:  Pedrito Gross M.D.    Consent:  Emergent    Procedure:  A pre-procedure \"time out\" was performed.  The patient was prepped and draped in the usual sterile fashion.  Using ultrasound guided Seldinger technique, a left internal jugular triple lumen central venous catheter was placed on the first attempt without difficulty under full sterile barrier precautions.  The guidewire was removed.  All the ports were flushed using sterile saline and capped. The catheter was sutured in place and a sterile dressing was applied.  No immediate complications. A chest xray was ordered and will be reviewed.     "

## 2024-06-10 NOTE — ED NOTES
Assist RN: Lab called with critical result of lactic of 13.1, ALT 3400, AST 3016, and glucose 48 at 0836. Critical lab result read back to lab.   Dr. Norman notified of critical lab result at 0837.  Critical lab result read back by Dr. Norman.

## 2024-06-10 NOTE — ASSESSMENT & PLAN NOTE
Found unresponsive in asystole  Suspected fentanyl overdose with UDS + for amphetamines, fentanyl, cannabinoids    No acute ischemic change on EKG, CTA negative for PE, head CT unremarkable  Continue postarrest care with normothermia protocol, maintain euvolemia, lung protective ventilation, eucapnia, euglycemia  LV function improving

## 2024-06-10 NOTE — ASSESSMENT & PLAN NOTE
Suspicion for anoxic encephalopathy due to out of hospital cardiac arrest with unknown downtime  Toxic encephalopathy due to drug ingestion remains in differential  Continue supportive care, normothermia protocol  Initial head CT unremarkable  Stat EEG: no seizures  MRI brain consistent with severe anoxic brain injury

## 2024-06-11 PROBLEM — M62.82 NON-TRAUMATIC RHABDOMYOLYSIS: Status: ACTIVE | Noted: 2024-01-01

## 2024-06-11 PROBLEM — F19.90 POLYSUBSTANCE USE DISORDER: Status: ACTIVE | Noted: 2024-01-01

## 2024-06-11 PROBLEM — R74.01 TRANSAMINITIS: Status: ACTIVE | Noted: 2024-01-01

## 2024-06-11 NOTE — PROGRESS NOTES
4 Eyes Skin Assessment Completed by OMAYRA Vega and OMAYRA Sheth.    Unable to turn pt to visualize posterior side d/t hemodynamic instability.     Head WDL  Ears WDL  Nose WDL  Mouth WDL  Neck WDL  Breast/Chest WDL  Shoulder Blades Unable to visualize  Spine Unable to visualize  (R) Arm/Elbow/Hand WDL  (L) Arm/Elbow/Hand WDL  Abdomen WDL  Groin WDL  Scrotum/Coccyx/Buttocks Unable to visualize  (R) Leg Scar and Scab  (L) Leg Scar and Scab  (R) Heel/Foot/Toe Blanching, flaking, calloused  (L) Heel/Foot/Toe Blanching, flaking, calloused    Devices In Places ECG, Blood Pressure Cuff, Pulse Ox, Hood, Arterial Line, ET Tube, OG/NG, Central Line, and BMS    Interventions In Place TAP System, Pillows, Q2 Turns, Low Air Loss Mattress, ZFlo Pillow, and Heels Loaded W/Pillows    Possible Skin Injury No    Pictures Uploaded Into Epic N/A  Wound Consult Placed N/A  RN Wound Prevention Protocol Ordered No

## 2024-06-11 NOTE — ASSESSMENT & PLAN NOTE
Likely secondary to overdose in the setting of polysubstance use disorder. ECHO showed EF 10-15%, severely reduced LV systolic function.  -Normothermia protocol

## 2024-06-11 NOTE — PROGRESS NOTES
Alerted resident Dr. Nash of critical procalcitonin of 26.8. Also alerted of critical lactate of 4.0, down from previous value of 6.3.

## 2024-06-11 NOTE — PROGRESS NOTES
"[facility] ICU Progress Note      Admit Date: 6/10/2024    Resident(s): Juju Ryan M.D.   Attending:  ARACELIS FITZGERALD/ Dr. Pina    Patient ID:    Name:  Luis Alfredo Sands     YOB: 1985  Age:  39 y.o.  male   MRN:  [id number]    Hospital Course (carried forward and updated):  Luis Alfredo Sands is a 39 y.o. male with a PMH of polysubstance use disorder, admitted 06/10/2024 for cardiac arrest.    Consultants:  Critical Care  Cardiology     Interval Events:    06/11 - Hypertensive overnight, has not required pressor therapy. Intermittent episodes of myoclonus, EEG without evidence of seizures. Off sedation, no corneal or gag reflex, responsive to pain in upper extremities only.     Vitals Range last 24h:  Temp:  [33.7 °C (92.7 °F)-37.8 °C (100 °F)] 37.8 °C (100 °F)  Pulse:  [] 111  Resp:  [26-41] 32  BP: ()/() 163/105  SpO2:  [100 %] 100 %      Intake/Output Summary (Last 24 hours) at 6/11/2024 1041  Last data filed at 6/11/2024 0600  Gross per 24 hour   Intake 7091.22 ml   Output 1683 ml   Net 5408.22 ml        Review of Systems   Reason unable to perform ROS: Intubated.       PHYSICAL EXAM:  Vitals:    06/11/24 0600 06/11/24 0615 06/11/24 0623 06/11/24 0630   BP: (!) 160/106 (!) 173/112 (!) 173/112 (!) 163/105   Pulse: (!) 113 (!) 115 (!) 111 (!) 111   Resp: (!) 32 (!) 32 (!) 32 (!) 32   Temp:       TempSrc:       SpO2: 100% 100% 100% 100%   Weight:  69.4 kg (153 lb)     Height:   1.727 m (5' 7.99\")     Body mass index is 23.27 kg/m².    O2 therapy: Pulse Oximetry: 100 %, O2 Delivery Device: Ventilator         Physical Exam  Constitutional:       Appearance: He is normal weight. He is ill-appearing.      Interventions: He is intubated.   HENT:      Head: Normocephalic and atraumatic.   Eyes:      General: No scleral icterus.     Conjunctiva/sclera: Conjunctivae normal.   Cardiovascular:      Rate and Rhythm: Regular rhythm. Tachycardia present.      Heart sounds: No murmur heard.  Pulmonary: "      Effort: He is intubated.      Breath sounds: Rhonchi present. No wheezing.   Abdominal:      General: Abdomen is flat. Bowel sounds are normal.      Palpations: Abdomen is soft.   Musculoskeletal:      Right lower leg: No edema.      Left lower leg: No edema.   Skin:     General: Skin is warm and dry.   Neurological:      Mental Status: He is unresponsive.      Comments: Responsive to painful stimuli in upper extremities only, does not respond to voice, does not follow commands.         Recent Labs     06/10/24  1313 06/10/24  1825 06/11/24  0344   ISTATAPH 7.219* 7.303* 7.362*   ISTATAPCO2 27.4 26.7 30.8   ISTATAPO2 97* 242* 159*   ISTATATCO2 12* 14* 18*   [id number] 96 100* 99   ISTATARTHCO3 11.2* 13.2* 17.5   ISTATARTBE -15* -11* -7*   ISTATTEMP 35.5 C 37.8 C 36.5 C   ISTATFIO2 50 70 40   ISTATSPEC Arterial Arterial Arterial   ISTATAPHTC 7.240* 7.291* 7.369*   [id number] 89* 246* 156*     Recent Labs     06/10/24  0729 06/10/24  1240 06/10/24  1955 06/10/24  2217 06/11/24  0428   SODIUM 143   < > 134* 138 135   POTASSIUM 4.6   < > 4.1 4.1 4.2   CHLORIDE 100   < > 102 106 102   CO2 15*   < > 12* 14* 15*   BUN 15   < > 27* 26* 37*   CREATININE 2.35*   < > 2.39* 2.31* 3.06*   MAGNESIUM 3.4*  --  2.0  --  2.0   PHOSPHORUS  --   --   --   --  4.0   CALCIUM 7.4*   < > 7.5* 7.8* 7.8*    < > = values in this interval not displayed.     Recent Labs     06/10/24  0729 06/10/24  1240 06/10/24  1955 06/10/24  2217 06/11/24  0337 06/11/24  0428   ALTSGPT 3400*  --   --   --   --  6503*   ASTSGOT 3716*  --   --   --   --  >7000*   ALKPHOSPHAT 75  --   --   --   --  117*   TBILIRUBIN <0.2  --   --   --   --  1.4   PREALBUMIN 12.3*  --   --   --  12.7*  --    GLUCOSE 48*   < > 174* 134*  --  111*    < > = values in this interval not displayed.     Recent Labs     06/10/24  0729 06/11/24  0428 06/11/24  0604   RBC 4.48* 5.33  --    HEMOGLOBIN 12.1* 14.4  --    HEMATOCRIT 40.7* 42.7  --    PLATELETCT 222 206  --     PROTHROMBTM  --   --  19.7*   APTT  --   --  31.1   INR  --   --  1.65*     Recent Labs     06/10/24  0729 06/11/24  0428   WBC 6.5 18.5*   NEUTSPOLYS 77.40* 89.80*   LYMPHOCYTES 15.40* 3.40*   MONOCYTES 6.10 5.10   EOSINOPHILS 0.00 0.00   BASOPHILS 0.20 0.00   ASTSGOT 3716* >7000*   ALTSGPT 3400* 6503*   ALKPHOSPHAT 75 117*   TBILIRUBIN <0.2 1.4       Meds:   ampicillin-sulbactam (UNASYN) IV  3 g Stopped (06/11/24 0558)    sodium bicarbonate 150 meq in D5W 1000 mL        hydrALAZINE  20 mg      labetalol  10-20 mg      heparin  5,000 Units      [Held by provider] ondansetron  4 mg      Respiratory Therapy Consult        famotidine  20 mg      senna-docusate  2 Tablet      And    polyethylene glycol/lytes  1 Packet      And    magnesium hydroxide  30 mL      And    bisacodyl  10 mg      lidocaine  2 mL      Pharmacy  1 Each      HYDROmorphone  0.5-1 mg      midazolam  1-5 mg      ipratropium-albuterol  3 mL      NORepinephrine  0-1 mcg/kg/min 0.15 mcg/kg/min (06/10/24 2021)    EPINEPHrine (Adrenalin) infusion  0-0.5 mcg/kg/min 0.2 mcg/kg/min (06/10/24 1217)    [Held by provider] ondansetron  4 mg      insulin regular  1-6 Units      And    dextrose bolus  25 g      thiamine  250 mg 250 mg (06/11/24 0523)        Procedures:  06/10 - Intubation, arterial line, central line, bronchoscopy with BAL    Imaging:  DX-CHEST-PORTABLE (1 VIEW)   Final Result         1.  No acute cardiopulmonary disease.      EC-ECHOCARDIOGRAM COMPLETE W/ CONT   Final Result      DX-CHEST-PORTABLE (1 VIEW)   Final Result      1.  Interval placement of left internal jugular central venous catheter.   2.  Interval placement of enteric sump tube.   3.  No evidence of procedure-related pneumothorax.      DX-ABDOMEN FOR TUBE PLACEMENT   Final Result      Enteric tube tip projects over the upper stomach      CT-CTA CHEST PULMONARY ARTERY W/ RECONS   Final Result      1.  No central or segmental pulmonary embolus   2.  BILATERAL dependent airspace  disease airway fluid suspicious for aspiration pneumonia   3.  Hepatic steatosis   4.  Trace LEFT anterior chest wall gas of uncertain etiology            CT-HEAD W/O   Final Result      No acute intracranial abnormality.            DX-CHEST-PORTABLE (1 VIEW)   Final Result         1.  No acute cardiopulmonary disease.          ASSESSEMENT and PLAN:    * Cardiac arrest (HCC)- (present on admission)  Assessment & Plan  Likely secondary to overdose in the setting of polysubstance use disorder. ECHO showed EF 10-15%, severely reduced LV systolic function.  -Normothermia protocol    Acute encephalopathy  Assessment & Plan  In the setting of cardiac arrest. EEG with findings of severe diffuse cerebral function.  -F/u NSE  -Consider MRI brain 72 hours post-cardiac arrest    Acute respiratory failure with hypoxia (HCC)  Assessment & Plan  Secondary to aspiration pneumonia in the setting of cardiac arrest. CTA thorax with findings of bilateral dependent airspace disease with fluid in airways.   Intubated 06/10.  -Ampicillin/sulbactam    Lactic acidosis  Assessment & Plan  Secondary to cardiac arrest.  -Sodium bicarbonate gtt    Transaminitis- (present on admission)  Assessment & Plan  Secondary to ischemic hepatitis in the setting of cardiac arrest. Hepatitis panel negative.  -Avoid hepatotoxins    Acute kidney injury (HCC)  Assessment & Plan  Secondary to ATN in the setting of cardiac arrest. Cr 2.35 on admission -> 3.06.  -Monitor  -Avoid nephrotoxins, renally dose medications    Polysubstance use disorder  Assessment & Plan  UDS positive for amphetamines, cannabinoids, fentanyl.         DISPO: Remains inpatient ICU    CODE STATUS: FULL CODE    Quality Measures:  Feeding: Tube feeds  Analgesia: Hydromorphone  Sedation: None  Thromboprophylaxis: None  Head of bed: >30 degrees  Ulcer prophylaxis: Famotidine  Glycemic control: Correctional: Insulin regular / Basal: None  Bowel care: Bowel regimen  Indwelling lines: Central  line, arterial line  Deescalation of antibiotics: Ampicillin/sulbactam      Juju Ryan M.D.

## 2024-06-11 NOTE — PROGRESS NOTES
Cardiology Follow Up Progress Note    Date of Service  6/11/2024    Attending Physician  Viri Pina M.D.    Chief Complaint   Cardiac arrest    HPI  Luis Alfredo Sands is a 39 y.o. male admitted 6/10/2024 with cardiac arrest    Interim Events  Patient unable to provide history due to ongoing respiratory failure.  There were no acute events per nursing staff.  Telemetry monitor demonstrated sinus tachycardia with intermittent atrial fibrillation with aberrancy.  His brother is currently at bedside.  He is advocating as his brother is medical proxy at this time.  Also mother was contacted during follow-up.    Review of Systems  Review of Systems    Vital signs in last 24 hours  Temp:  [36 °C (96.8 °F)-37.8 °C (100 °F)] 37.8 °C (100 °F)  Pulse:  [] 111  Resp:  [26-40] 32  BP: ()/() 163/105  SpO2:  [100 %] 100 %    Physical Exam  Physical Exam  Constitutional:       Appearance: Normal appearance. He is ill-appearing.   HENT:      Head: Normocephalic and atraumatic.      Mouth/Throat:      Mouth: Mucous membranes are moist.      Pharynx: Oropharynx is clear.   Eyes:      Extraocular Movements: Extraocular movements intact.      Conjunctiva/sclera: Conjunctivae normal.   Cardiovascular:      Rate and Rhythm: Normal rate and regular rhythm.      Pulses: Normal pulses.      Heart sounds: Normal heart sounds. No murmur heard.     No friction rub. No gallop.   Pulmonary:      Effort: Pulmonary effort is normal.      Breath sounds: Normal breath sounds. No wheezing, rhonchi or rales.      Comments: Intubated with endotracheal tube taped at the lips  Abdominal:      General: Bowel sounds are normal. There is no distension.      Palpations: Abdomen is soft.   Musculoskeletal:         General: Normal range of motion.      Cervical back: Normal range of motion and neck supple.      Right lower leg: No edema.      Left lower leg: No edema.   Skin:     General: Skin is warm and dry.   Neurological:      General:  "No focal deficit present.      Mental Status: He is oriented to person, place, and time. Mental status is at baseline.   Psychiatric:         Mood and Affect: Mood normal.         Behavior: Behavior normal.         Thought Content: Thought content normal.         Judgment: Judgment normal.         Lab Review  Lab Results   Component Value Date/Time    WBC 18.5 (H) 06/11/2024 04:28 AM    RBC 5.33 06/11/2024 04:28 AM    HEMOGLOBIN 14.4 06/11/2024 04:28 AM    HEMATOCRIT 42.7 06/11/2024 04:28 AM    MCV 80.1 (L) 06/11/2024 04:28 AM    MCH 27.0 06/11/2024 04:28 AM    MCHC 33.7 06/11/2024 04:28 AM    MPV 10.6 06/11/2024 04:28 AM      Lab Results   Component Value Date/Time    SODIUM 135 06/11/2024 04:28 AM    POTASSIUM 4.2 06/11/2024 04:28 AM    CHLORIDE 102 06/11/2024 04:28 AM    CO2 15 (L) 06/11/2024 04:28 AM    GLUCOSE 111 (H) 06/11/2024 04:28 AM    BUN 37 (H) 06/11/2024 04:28 AM    CREATININE 3.06 (H) 06/11/2024 04:28 AM      Lab Results   Component Value Date/Time    ASTSGOT >7000 (HH) 06/11/2024 04:28 AM    ALTSGPT 6503 (HH) 06/11/2024 04:28 AM     Lab Results   Component Value Date/Time    TRIGLYCERIDE 66 06/10/2024 07:29 AM    TROPONINT 89 (H) 06/10/2024 07:29 AM       No results for input(s): \"NTPROBNP\" in the last 72 hours.    Cardiac Imaging and Procedures Review  Echocardiogram: Severe LV systolic dysfunction with estimate ejection fraction of 10 to 15%, severe reduction in RV systolic function with elevated right atrial pressure estimated at 15 mmHg      Assessment/Plan  1.  Cardiac arrest status post CPR bystander as well as EMS CPR with return of spontaneous circulation  2.  Marijuana use  3.  Alcohol use  4.  Amphetamine and fentanyl toxicity    Clinically, he is doing very poorly and likely suffered a drug overdose leading to progressive hypoxia with subsequent hypoxic cardiac arrest.  The overall timeframe of his depressed LV systolic function likely is multifactorial in nature however I have a high " clinical suspicion for a toxic cardiomyopathy.  At this time patient appears to still be in multisystem organ failure and requiring ongoing mechanical ventilation.  An extensive discussion was had with Dr. Pina of the critical care service regarding overall prognosis.  He is not able to be instituted on goal-directed medical therapy for his likely nonischemic cardiomyopathy at this time.  We will continue to follow closely and institute appropriate medical therapy if clinically indicated.  At this time his brother Nagi is currently at bedside and aware of his current plan as well as his mother who was spoken to on the telephone.  His mother is planning on coming from the [facility] within the next 24 to 48 hours.    Critical care time: 60 minutes was utilized in direct face-to-face patient contact, discussion with family and procuring information from family, chart review, chart preparation, laboratory review, imaging review, EKG review and discussion and a multidisciplinary team approach          Thank you for allowing me to participate in the care of this patient.  I will continue to follow this patient    Please contact me with any questions.    Clinton Lion D.O.   Cardiologist, [facility]  [phone] - [phone]

## 2024-06-11 NOTE — CARE PLAN
Notified by lab of critical AST/ALT values; communicated values to MD. Also notified MD of low urine output of 180 mL total for shift.

## 2024-06-11 NOTE — DIETARY
"Nutrition Support Assessment:  Day 1 of admit.  Luis Alfredo Sands is a 39 y.o. male with admitting DX of Cardiac arrest (HCC) [I46.9]     Current problem list:    Cardiac arrest (HCC) (POA: Yes)    Acute respiratory failure with hypoxia (HCC) (POA: Unknown)    Acute kidney injury (HCC) (POA: Unknown)    Transaminitis (POA: Yes)    Lactic acidosis (POA: Unknown)    Acute encephalopathy (POA: Unknown)    Shock (HCC) (POA: Unknown)    Non-traumatic rhabdomyolysis (POA: Yes)    Polysubstance use disorder (POA: Unknown)     Assessment:  Estimated Nutritional Needs based on:   Height: 172.7 cm (5' 7.99\")  Weight: 69.4 kg (153 lb)  Body mass index is 23.27 kg/m²., BMI classification: Normal    Calculation/Equation: REE per MSJ (1585 kcal/day) x 1.2-1.4 = 1902 - 2218 kcal/day  PSU (24-hour T-max 37.8C, VE = 18.1L/min) = 2182 kcal/day   Total Calories / day: 1943 - 2151  (Calories / k - 31)  Total Grams Protein / day: 70 - 83  (Grams Protein / k - 1.2)      Evaluation:   Indication for nutrition support: pt intubated, NPO. Consult received to initiate TF.  Enteral access per  KUB: \"Enteric tube tip projects over the upper stomach.\"  Labs: BUN 37, crea 3.06, GFR 26, Ca 7.8, corrected Ca 8.5, AST >7000, ALT 6503, CPK >61056, CRP 3.18  MAR: unasyn, pericolace, SSI, NaBicarb, thiamine in D5. Vasopressors off per RN.  Skin: no documented wounds or edema  GI: BMS in place, 400 cc output overnight  No chart wt hx available for review in EMR.  Moderate-protein formula appropriate for pt w/ GAVIN.     Malnutrition Risk: Unable to fully assess at this time.     Recommendations/Plan:  Initiate TF Jevity 1.5 at 25 mL/hr and advance per protocol to goal rate 60 mL/hr which will provide in 24 hrs 2160 kcals, 91 grams protein, 1094 mL free water, 30 g fiber.  Additional fluids per MD.  Monitor weight trends.    RD following.  "

## 2024-06-11 NOTE — ASSESSMENT & PLAN NOTE
Secondary to ischemic hepatitis in the setting of cardiac arrest. Hepatitis panel negative.  -Avoid hepatotoxins

## 2024-06-11 NOTE — PROGRESS NOTES
Pt's BP increasing into the 160s with spikes into the 180s. Pt exhibiting what appears to be myoclonic movement. MD notified.

## 2024-06-11 NOTE — PROGRESS NOTES
Monitor summary:  Unsustained runs of Vtach on arrival to unit  I/O afib and ST to SR - ST  Pt in afib and seen on EKG at 1300. Then self converted to SR - ST at around 1350     No monitor strips available

## 2024-06-11 NOTE — PROGRESS NOTES
Critical Care Progress Note    Date of admission  6/10/2024    Chief Complaint  39 y.o. male admitted 6/10/2024 with cardiac arrest    Hospital Course  Mr. Sands is a 39 year old male with no known past medical history who was found down by his brother on 6/10/2024 with unknown downtime.  The brother initiated CPR and EMS was activated.  When EMS arrived, they found the patient in asystole requiring 2 rounds of CPR with epinephrine before ROSC was achieved.  Unfortunately, the patient lost pulses again and arrived to the ER with CPR in progress receiving more epinephrine and bicarbonate for severe acidemia.  The patient's work up revealed a urine drug screen positive for amphetamines, cannabinoids, and fentanyl.  He was admitted to the ICU for ongoing care.    Interval Problem Update  Reviewed last 24 hour events:   - hypertensive overnight   - labetalol given   - GCS of 3, psoturing with sternal rub   - no sedation   - Tmax 37.8   - -110s   - -200s   - bicarb at 150cc/hr   - BMS of 400cc overnight   - UOP of 180cc overnight, alcaraz   - NPO   - right radial art line   - VD#2   - CXR(reviewed): slight cephalization   - no SBT   - day 2/5 of unasyn   - pepcid   - heparin   - CPK > 22,000   - WBCs 18   - K 4.2   - creat 3.06   - AST > 7000   - ALT 6503   - ammonia 39   - INR 1.65   - platelets 206    Review of Systems  Review of Systems   Unable to perform ROS: Intubated        Vital Signs for last 24 hours   Temp:  [33.7 °C (92.7 °F)-37.8 °C (100 °F)] 37.8 °C (100 °F)  Pulse:  [] 104  Resp:  [10-41] 32  BP: ()/() 158/105  SpO2:  [92 %-100 %] 100 %    Hemodynamic parameters for last 24 hours       Respiratory Information for the last 24 hours  Vent Mode: APVCMV  Rate (breaths/min): 32  Vt Target (mL): 570  PEEP/CPAP: 8  MAP: 13  Control VTE (exp VT): 568    Physical Exam   Physical Exam  Vitals and nursing note reviewed.   Constitutional:       General: He is not in acute distress.      Appearance: He is ill-appearing and toxic-appearing.   HENT:      Head: Normocephalic and atraumatic.      Right Ear: External ear normal.      Left Ear: External ear normal.      Nose: Nose normal. No rhinorrhea.      Mouth/Throat:      Mouth: Mucous membranes are moist.      Comments: ETT in place  Eyes:      General: No scleral icterus.     Extraocular Movements:      Right eye: No nystagmus.      Left eye: No nystagmus.      Conjunctiva/sclera:      Right eye: Right conjunctiva is injected. Chemosis present.      Left eye: Left conjunctiva is injected. Chemosis present.      Comments: Difficult to assess pupil reaction due to extreme upward gaze   Neck:      Comments: Right IJ TLC (6/10)  Cardiovascular:      Rate and Rhythm: Regular rhythm. Tachycardia present.      Pulses: Normal pulses.      Heart sounds: No murmur heard.  Pulmonary:      Breath sounds: No wheezing.      Comments: Overbreathing the ventilator, clear  Chest:      Chest wall: No tenderness.   Abdominal:      Palpations: Abdomen is soft.      Tenderness: There is no abdominal tenderness. There is no guarding or rebound.   Genitourinary:     Comments: Hood in place  Musculoskeletal:      Cervical back: Normal range of motion and neck supple.      Right lower leg: No edema.      Left lower leg: No edema.   Lymphadenopathy:      Cervical: No cervical adenopathy.   Skin:     General: Skin is warm and dry.      Capillary Refill: Capillary refill takes less than 2 seconds.      Findings: No rash.   Neurological:      Comments: Fasciculations noted to bilateral masseter muscles, no cough/gag/corneal, decorticate posturing with sternal rub   Psychiatric:      Comments: Unable to assess         Medications  Current Facility-Administered Medications   Medication Dose Route Frequency Provider Last Rate Last Admin    ampicillin/sulbactam (Unasyn) 3 g in  mL IVPB  3 g Intravenous Q6HRS Pedrito Gross M.D.   Stopped at 06/11/24 0558    lactated  ringers infusion   Intravenous Continuous Pedrito Gross M.D. 83 mL/hr at 06/11/24 0430 New Bag at 06/11/24 0430    [Held by provider] ondansetron (Zofran) syringe/vial injection 4 mg  4 mg Intravenous Q4HRS PRN Pedrito Gross M.D.        Respiratory Therapy Consult   Nebulization Continuous RT Pedrito Gross M.D.        famotidine (Pepcid) tablet 20 mg  20 mg Enteral Tube Q24HRS Pedrito Gross M.D.   20 mg at 06/11/24 0515    Or    famotidine (Pepcid) injection 20 mg  20 mg Intravenous Q24HRS Pedrito Gross M.D.        senna-docusate (Pericolace Or Senokot S) 8.6-50 MG per tablet 2 Tablet  2 Tablet Enteral Tube BID Pedrito Gross M.D.   2 Tablet at 06/11/24 0515    And    polyethylene glycol/lytes (Miralax) Packet 1 Packet  1 Packet Enteral Tube QDAY PRN Pedrito Gross M.D.        And    magnesium hydroxide (Milk Of Magnesia) suspension 30 mL  30 mL Enteral Tube QDAY PRN Pedrito Gross M.D.        And    bisacodyl (Dulcolax) suppository 10 mg  10 mg Rectal QDAY PRN Pedrito Gross M.D.        lidocaine (Xylocaine) 1 % injection 2 mL  2 mL Tracheal Tube Q30 MIN PRN Pedrito Gross M.D.        Pharmacy Consult: Enteral tube insertion - review meds/change route/product selection  1 Each Other PHARMACY TO DOSE Pedrito Gross M.D.        propofol (DIPRIVAN) injection  0-80 mcg/kg/min Intravenous Continuous Pedrito Gross M.D.        HYDROmorphone (Dilaudid) injection 0.5-1 mg  0.5-1 mg Intravenous Q HOUR PRN Pedrito Gross M.D.        midazolam (Versed) injection 1-5 mg  1-5 mg Intravenous Q HOUR PRN Pedrito Gross M.D.        ipratropium-albuterol (DUONEB) nebulizer solution  3 mL Nebulization Q2HRS PRN (RT) Pedrito Gross M.D.        magnesium sulfate 20 g/500mL infusion  0.5-2 g/hr Intravenous Continuous Pedrito Gross M.D.   Held at 06/10/24 1100    norepinephrine (Levophed) 8 mg in 250 mL NS infusion (premix)  0-1 mcg/kg/min Intravenous Continuous Pedrito Gross M.D. 20.4 mL/hr at 06/10/24 2021 0.15  mcg/kg/min at 06/10/24 2021    EPINEPHrine (Adrenalin) infusion 4 mg/250 mL (premix)  0-0.5 mcg/kg/min Intravenous Continuous Pedrito Gross M.D. 54.5 mL/hr at 06/10/24 1217 0.2 mcg/kg/min at 06/10/24 1217    [Held by provider] ondansetron (Zofran ODT) dispertab 4 mg  4 mg Enteral Tube Q4HRS PRN Pedrito Gross M.D.        insulin regular (HumuLIN R,NovoLIN R) injection  1-6 Units Subcutaneous Q6HRS Pedrito Gross M.D.   2 Units at 06/10/24 1831    And    dextrose 10 % BOLUS 25 g  25 g Intravenous Q15 MIN PRN Pedrito Gross M.D.        thiamine (B-1) 250 mg in dextrose 5% 100 mL IVPB  250 mg Intravenous Q12HRS Pedrito Gross M.D. 200 mL/hr at 06/11/24 0523 250 mg at 06/11/24 0523       Fluids    Intake/Output Summary (Last 24 hours) at 6/11/2024 0637  Last data filed at 6/11/2024 0600  Gross per 24 hour   Intake 6577.97 ml   Output 1283 ml   Net 5294.97 ml       Laboratory  Recent Labs     06/10/24  1313 06/10/24  1825 06/11/24  0344   ISTATAPH 7.219* 7.303* 7.362*   ISTATAPCO2 27.4 26.7 30.8   ISTATAPO2 97* 242* 159*   ISTATATCO2 12* 14* 18*   UUSZSKY9JJS 96 100* 99   ISTATARTHCO3 11.2* 13.2* 17.5   ISTATARTBE -15* -11* -7*   ISTATTEMP 35.5 C 37.8 C 36.5 C   ISTATFIO2 50 70 40   ISTATSPEC Arterial Arterial Arterial   ISTATAPHTC 7.240* 7.291* 7.369*   [id number] 89* 246* 156*         Recent Labs     06/10/24  0729 06/10/24  1240 06/10/24  1955 06/10/24  2217 06/11/24  0428   SODIUM 143   < > 134* 138 135   POTASSIUM 4.6   < > 4.1 4.1 4.2   CHLORIDE 100   < > 102 106 102   CO2 15*   < > 12* 14* 15*   BUN 15   < > 27* 26* 37*   CREATININE 2.35*   < > 2.39* 2.31* 3.06*   MAGNESIUM 3.4*  --  2.0  --  2.0   PHOSPHORUS  --   --   --   --  4.0   CALCIUM 7.4*   < > 7.5* 7.8* 7.8*    < > = values in this interval not displayed.     Recent Labs     06/10/24  0729 06/10/24  1240 06/10/24  1955 06/10/24  2217 06/11/24  0337 06/11/24  0428   ALTSGPT 3400*  --   --   --   --  6503*   ASTSGOT 3716*  --   --   --   --  >7000*    ALKPHOSPHAT 75  --   --   --   --  117*   TBILIRUBIN <0.2  --   --   --   --  1.4   PREALBUMIN 12.3*  --   --   --  12.7*  --    GLUCOSE 48*   < > 174* 134*  --  111*    < > = values in this interval not displayed.     Recent Labs     06/10/24  0729 06/11/24 0428   WBC 6.5 18.5*   NEUTSPOLYS 77.40* 89.80*   LYMPHOCYTES 15.40* 3.40*   MONOCYTES 6.10 5.10   EOSINOPHILS 0.00 0.00   BASOPHILS 0.20 0.00   ASTSGOT 3716* >7000*   ALTSGPT 3400* 6503*   ALKPHOSPHAT 75 117*   TBILIRUBIN <0.2 1.4     Recent Labs     06/10/24  0729 06/11/24 0428   RBC 4.48* 5.33   HEMOGLOBIN 12.1* 14.4   HEMATOCRIT 40.7* 42.7   PLATELETCT 222 206       Imaging  ECHO:  CONCLUSIONS  Severely reduced left ventricular systolic function.  The left ventricular ejection fraction is visually estimated to be less than 10-15%.  Unable to accurately estimate diastolic function.  The right ventricle is not well visualized, but appears dilated with severe reduced systolic function.  Dilated inferior vena cava without inspiratory collapse.    Assessment/Plan  * Cardiac arrest (HCC)- (present on admission)  Assessment & Plan  Found unresponsive in asystole  Suspected fentanyl overdose with UDS + for amphetamines, fentanyl, cannabinoids  No acute ischemic change on EKG, CTA negative for PE, head CT unremarkable  Continue postarrest care with normothermia protocol, maintain euvolemia, lung protective ventilation, eucapnia, euglycemia  ECHO with Bi-V failure    Non-traumatic rhabdomyolysis- (present on admission)  Assessment & Plan  ?due to prolonged downtime vs amphetamine use  Trend CPK  Bicarbonate infusion    Shock (HCC)  Assessment & Plan  Undifferentiated shock post asystolic cardiac arrest  MAP goals > 65,  I am actively titrating vasopressors for MAP goal  ECHO with reduced biventricular function  No evidence of PE, tamponade or acute ischemic change    Acute encephalopathy  Assessment & Plan  Suspicion for anoxic encephalopathy due to out of  hospital cardiac arrest with unknown downtime  Toxic encephalopathy due to drug ingestion remains in differential  Continue supportive care, normothermia protocol  Initial head CT unremarkable  Stat EEG  Follow-up NSE and further prognostication after 72 hours  Likely MRI brain as appropriate interval.    Lactic acidosis  Assessment & Plan  Secondary to cardiac arrest  S/p IVF resuscitation and bicarbonate  Trend lactic acid level    Transaminitis- (present on admission)  Assessment & Plan  Suspect ischemic hepatopathy due to cardiac arrest  Hepatitis panel was negative  May need RUQ US  Monitor ammonia/INR    Acute kidney injury (HCC)  Assessment & Plan  Suspect component of ATN from cardiac arrest  Limit nephrotoxins  Monitor UOP/creat  May need nephrology consult for RRT    Acute respiratory failure with hypoxia (HCC)  Assessment & Plan  Out of hospital cardiac arrest and intubated on 6/10/2024  Cont full ventilator support  RT/O2 protocols  Ventilator bundle protocols  I am actively adjusting ventilator settings based on ABGs/vent mechanics  S/p bronchoscopy with BAL on 6/10, cont empiric Unasyn, follow cultures  SAT/SBTs as clinically indicated           VTE:  Heparin  Ulcer: H2 Antagonist  Lines: Central Line  Ongoing indication addressed, Arterial Line  Ongoing indication addressed, and Hood Catheter  Ongoing indication addressed    I have performed a physical exam and reviewed and updated ROS and Plan today (6/11/2024). In review of yesterday's note (6/10/2024), there are no changes except as documented above.     Discussed patient condition and risk of morbidity and/or mortality with RN, RT, Pharmacy, and Charge nurse / hot rounds    The patient remains critically ill.  I have assessed and reassessed the respiratory status and made ventilator adjustments based upon arterial blood gas analysis, ventilator waveforms and airway mechanics.  I have assessed and reassessed the blood pressure, hemodynamics,  cardiovascular status. This patient remains at high risk for worsening cardiopulmonary dysfunction and death without the above critical care interventions.    Critical care time = 108 minutes in directly providing and coordinating critical care and extensive data review.  No time overlap and excludes procedures.

## 2024-06-11 NOTE — ASSESSMENT & PLAN NOTE
Secondary to aspiration pneumonia in the setting of cardiac arrest. CTA thorax with findings of bilateral dependent airspace disease with fluid in airways.   Intubated 06/10.  Completed 5 day course of ampicillin/sulbactam.

## 2024-06-11 NOTE — CARE PLAN
The patient is Watcher - Medium risk of patient condition declining or worsening    Shift Goals  Clinical Goals: MAP > 65, temp < 37, q4h neuro assessments  Patient Goals: RAGINI  Family Goals: Updates    Problem: Hemodynamics  Goal: Patient's hemodynamics, fluid balance and neurologic status will be stable or improve  Outcome: Progressing     Problem: Pain - Standard  Goal: Alleviation of pain or a reduction in pain to the patient’s comfort goal  Outcome: Progressing     Problem: Neuro Status  Goal: Neuro status will remain stable or improve  Outcome: Not Progressing

## 2024-06-11 NOTE — ASSESSMENT & PLAN NOTE
Secondary to ATN in the setting of cardiac arrest. Cr 2.35 on admission -> 8.28.  -Avoid nephrotoxins, renally dose medications

## 2024-06-11 NOTE — HOSPITAL COURSE
Mr. Sands is a 39 year old male with no known past medical history who was found down by his brother on 6/10/2024 with unknown downtime.  The brother initiated CPR and EMS was activated.  When EMS arrived, they found the patient in asystole requiring 2 rounds of CPR with epinephrine before ROSC was achieved.  Unfortunately, the patient lost pulses again and arrived to the ER with CPR in progress receiving more epinephrine and bicarbonate for severe acidemia.  The patient's work up revealed a urine drug screen positive for amphetamines, cannabinoids, and fentanyl.  He was admitted to the ICU for ongoing care.  6/11 - VD#2, no gag/cough/corneal, decorticate posturing with sternal rub, worsening renal function, updated family of very poor prognosis  6/12 - VD#3, no gag/cough/corneal, no posturing or w/d with noxious stimuli, creat now at 5, mother and sister in town-->updated with concerns that patient may proceed to brain death  6/13 - VD#4, no gag/cough/corneal, no w/d to pain, creat at 6 with very little UOP, Palliative consulted, MRI ordered, updated family  6/14 - VD#5, no gag/cough/corneal, no w/d to pain, creat to 8, anuric, MRI brain with anoxic brain injury  6/16-BAL performed for hypoxemia and plugging, unasyn resumed, hyperK shifting therapy and lasix challenge w minimal UOP  6/17-No neuro changes, HD initiated for critical hyperK and VOL

## 2024-06-11 NOTE — CARE PLAN
Problem: Ventilation  Goal: Ability to achieve and maintain unassisted ventilation or tolerate decreased levels of ventilator support  Description: Target End Date:  4 days     Document on Vent flowsheet    1.  Support and monitor invasive and noninvasive mechanical ventilation  2.  Monitor ventilator weaning response  3.  Perform ventilator associated pneumonia prevention interventions  4.  Manage ventilation therapy by monitoring diagnostic test results  Outcome: Progressing                                                                 Ventilatory Daily Summary     Vent day #2  Airway: 8/24    Vent settings: 32/570/8/30  Weaning trials: N/A  Resp procedures: N/A

## 2024-06-11 NOTE — PROGRESS NOTES
4 Eyes Skin Assessment Completed by Favian KENNEY RN and Kimberly MERCEDES RN.    Head WDL  Ears WDL  Nose WDL  Mouth WDL, braces, wax at bedside  Neck WDL  Breast/Chest WDL  Shoulder Blades WDL  Spine Abrasion  (R) Arm/Elbow/Hand WDL  (L) Arm/Elbow/Hand WDL  Abdomen WDL  Groin WDL  Scrotum/Coccyx/Buttocks WDL  (R) Leg Scar and Scab  (L) Leg Scar and Scab  (R) Heel/Foot/Toe Blanching, calloused  (L) Heel/Foot/Toe Blanching, calloused                          Devices In Places ECG, Blood Pressure Cuff, Pulse Ox, Hood, Arterial Line, SCD's, ET Tube, OG/NG, and Central Line      Interventions In Place Pillows, Elbow Mepilex, Q2 Turns, Low Air Loss Mattress, and Heels Loaded W/Pillows    Possible Skin Injury No    Pictures Uploaded Into Epic Yes  Wound Consult Placed N/A  RN Wound Prevention Protocol Ordered No

## 2024-06-11 NOTE — CARE PLAN
Problem: Ventilation  Goal: Ability to achieve and maintain unassisted ventilation or tolerate decreased levels of ventilator support  Description: Target End Date:  4 days     Document on Vent flowsheet    1.  Support and monitor invasive and noninvasive mechanical ventilation  2.  Monitor ventilator weaning response  3.  Perform ventilator associated pneumonia prevention interventions  4.  Manage ventilation therapy by monitoring diagnostic test results  Outcome: Progressing     Ventilator Daily Summary    Vent Day #2  Airway: 8 @24    Ventilator settings:32/570/8/30   Weaning trials: no  Respiratory Procedures: no      Plan: Continue current ventilator settings and wean mechanical ventilation as tolerated per physician orders.

## 2024-06-11 NOTE — PROGRESS NOTES
Updated Dr. Pina regarding critical CPK >88641. Dr. Pina to place orders for sodium bicarb gtt.    PRN. Orders received for hypertension.

## 2024-06-11 NOTE — ASSESSMENT & PLAN NOTE
Likely anoxic brain injury in the setting of cardiac arrest. EEG with findings of severe diffuse cerebral function. Elevated .7.  MRI brain showed evidence of diffuse anoxic brain injury.

## 2024-06-11 NOTE — CARE PLAN
The patient is Watcher - Medium risk of patient condition declining or worsening    Shift Goals  Clinical Goals: MAP > 65, temp < 37, q4h neuro assessments  Patient Goals: RAGINI  Family Goals: Updates    Problem: Hemodynamics  Goal: Patient's hemodynamics, fluid balance and neurologic status will be stable or improve  Outcome: Progressing  Note: Titrated pt off levophed; discussed elevated BP with MD and determined not to intervene unless SBP > 180.     Problem: Rectal Tube  Goal: Fecal output will be contained and skin will remain free from irritation  Outcome: Progressing  Note: Assessed BMS to ensure patency.      Problem: Pain - Standard  Goal: Alleviation of pain or a reduction in pain to the patient’s comfort goal  Outcome: Progressing  Note: Assessed potential pain using CPOT q2h and PRN--pt remained a CPOT of 0 throughout this shift.      Problem: Skin Integrity  Goal: Skin integrity is maintained or improved  Outcome: Progressing  Note: Performed and documented q2h turns; applied elbow and heel mepilexes, used pillows to float heels.      Problem: Neuro Status  Goal: Neuro status will remain stable or improve  Outcome: Progressing  Note: Assessed pt's neuro status q4h and PRN--pt began shift unresponsive but started withdrawing to pain in upper extremities this shift. Pt of all sedation.        Progress made toward(s) clinical / shift goals:  See above.

## 2024-06-11 NOTE — CARE PLAN
Problem: Safety - Medical Restraint  Goal: Free from restraint(s) (Restraint for Interference with Medical Device)  Outcome: Progressing     Problem: Hemodynamics  Goal: Patient's hemodynamics, fluid balance and neurologic status will be stable or improve  Outcome: Progressing     Problem: Mechanical Ventilation  Goal: Safe management of artificial airway and ventilation  Outcome: Progressing     Problem: Urinary Elimination  Goal: Establish and maintain regular urinary output  Outcome: Progressing     Problem: Bowel Elimination  Goal: Establish and maintain regular bowel function  Outcome: Progressing     Problem: Rectal Tube  Goal: Fecal output will be contained and skin will remain free from irritation  Outcome: Progressing     Problem: Pain - Standard  Goal: Alleviation of pain or a reduction in pain to the patient’s comfort goal  Outcome: Progressing   The patient is Unstable - High likelihood or risk of patient condition declining or worsening         Progress made toward(s) clinical / shift goals:     Patient is not progressing towards the following goals:

## 2024-06-11 NOTE — ASSESSMENT & PLAN NOTE
Likely due to prolonged downtime     Trend CPK-->improving   S/P Bicarb infusion    Now with dense GAVIN/ATN

## 2024-06-11 NOTE — PROGRESS NOTES
Pt appeared to have peaked T waves on monitor and lab called to query significant elevation of potassium from 3.5 to 4.3. Redrew and resent BMP, alerted resident Dr. Nash, and ordered STAT EKG per Dr. Nash. EKG showed no peaked T waves and redrawn potassium resulted at 4.1. Received orders to continue current plan of care.

## 2024-06-12 NOTE — PROGRESS NOTES
Lab called critical calcium of 6.7. Critical result read back. Dr. Ryan notified of critical result. MD ordering calcium repletion.

## 2024-06-12 NOTE — PROGRESS NOTES
Cardiology Follow Up Progress Note    Date of Service  6/12/2024    Attending Physician  Viri Pina M.D.    Chief Complaint   Cardiac arrest    HPI  Luis Alfredo Sands is a 39 y.o. male admitted 6/10/2024 with cardiac arrest likely secondary to hypoxia from acute drug intoxication    Interim Events  Patient unable to provide history due to ongoing respiratory failure.  There were no acute events per nursing staff.  Telemetry monitor demonstrated sinus tachycardia with intermittent atrial fibrillation with aberrancy.  Urine output has been approximately 100 cc.  Review of Systems  Review of Systems    Vital signs in last 24 hours  Temp:  [36.4 °C (97.5 °F)-38.1 °C (100.6 °F)] 36.8 °C (98.2 °F)  Pulse:  [101-129] 108  Resp:  [31-50] 32  BP: (109-155)/() 155/98  SpO2:  [97 %-100 %] 100 %    Physical Exam  Physical Exam  Constitutional:       Appearance: Normal appearance. He is ill-appearing.   HENT:      Head: Normocephalic and atraumatic.      Mouth/Throat:      Mouth: Mucous membranes are moist.      Pharynx: Oropharynx is clear.   Eyes:      Extraocular Movements: Extraocular movements intact.      Conjunctiva/sclera: Conjunctivae normal.   Cardiovascular:      Rate and Rhythm: Normal rate and regular rhythm.      Pulses: Normal pulses.      Heart sounds: Normal heart sounds. No murmur heard.     No friction rub. No gallop.   Pulmonary:      Effort: Pulmonary effort is normal.      Breath sounds: Rhonchi present. No wheezing or rales.      Comments: Intubated with endotracheal tube taped at the lips  Abdominal:      General: Bowel sounds are normal. There is no distension.      Palpations: Abdomen is soft.   Musculoskeletal:         General: Normal range of motion.      Cervical back: Normal range of motion and neck supple.      Right lower leg: No edema.      Left lower leg: No edema.   Skin:     General: Skin is warm and dry.   Neurological:      General: No focal deficit present.      Mental Status:  "Mental status is at baseline.   Psychiatric:         Mood and Affect: Mood normal.         Behavior: Behavior normal.         Thought Content: Thought content normal.         Judgment: Judgment normal.         Lab Review  Lab Results   Component Value Date/Time    WBC 15.9 (H) 06/12/2024 05:10 AM    RBC 4.70 06/12/2024 05:10 AM    HEMOGLOBIN 12.8 (L) 06/12/2024 05:10 AM    HEMATOCRIT 36.8 (L) 06/12/2024 05:10 AM    MCV 78.3 (L) 06/12/2024 05:10 AM    MCH 27.2 06/12/2024 05:10 AM    MCHC 34.8 06/12/2024 05:10 AM    MPV 10.9 06/12/2024 05:10 AM      Lab Results   Component Value Date/Time    SODIUM 136 06/12/2024 05:10 AM    POTASSIUM 4.9 06/12/2024 05:10 AM    CHLORIDE 95 (L) 06/12/2024 05:10 AM    CO2 23 06/12/2024 05:10 AM    GLUCOSE 102 (H) 06/12/2024 05:10 AM    BUN 66 (H) 06/12/2024 05:10 AM    CREATININE 4.36 (HH) 06/12/2024 05:10 AM      Lab Results   Component Value Date/Time    ASTSGOT 2520 (HH) 06/12/2024 05:10 AM    ALTSGPT 3745 (HH) 06/12/2024 05:10 AM     Lab Results   Component Value Date/Time    TRIGLYCERIDE 66 06/10/2024 07:29 AM    TROPONINT 89 (H) 06/10/2024 07:29 AM       No results for input(s): \"NTPROBNP\" in the last 72 hours.    Cardiac Imaging and Procedures Review  Echocardiogram: Severe LV systolic dysfunction with estimate ejection fraction of 10 to 15%, severe reduction in RV systolic function with elevated right atrial pressure estimated at 15 mmHg      Assessment/Plan  1.  Cardiac arrest status post CPR bystander as well as EMS CPR with return of spontaneous circulation  2.  Marijuana use  3.  Alcohol use  4.  Amphetamine and fentanyl toxicity    Clinically, he is doing very poorly and likely suffered a drug overdose leading to progressive hypoxia with subsequent hypoxic cardiac arrest.  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  At this time patient appears to still be in multisystem organ failure and requiring ongoing mechanical ventilation.  An extensive discussion was had with Dr. Pina of the critical care service regarding overall prognosis.  He is not able to be instituted on goal-directed medical therapy for his likely nonischemic cardiomyopathy at this time given ongoing pressor support.  We will continue to follow closely and institute appropriate medical therapy if clinically indicated.  At this time his brother Nagi is currently aware of his current plan as well as his mother who was spoken to on the telephone.  His mother is planning on coming from the [facility] within the next 24 to 48 hours.    Given his diffuse rhonchorous breathing I suspect that he is starting to significantly become hypervolemic and may benefit from renal replacement therapy in the form of dialysis versus ultrafiltration.  Given his cardiomyopathy this will be difficult to institute however his blood pressure is currently stable on pressor therapy.  I will defer clinical decision to the critical care team.    Critical care time: 40 minutes was utilized in direct face-to-face patient contact, discussion with family and procuring information from family, chart review, chart preparation, laboratory review, imaging review, EKG review and discussion and a multidisciplinary team approach          Thank you for allowing me to participate in the care of this patient.  I will continue to follow this patient    Please contact me with any questions.    Clinton Lion D.O.   Cardiologist, [facility]  [phone]

## 2024-06-12 NOTE — PROGRESS NOTES
4 Eyes Skin Assessment Completed by OMAYRA Vega and OMAYRA Sheth.    Head WDL  Ears WDL  Nose WDL  Mouth WDL  Neck WDL  Breast/Chest WDL  Shoulder Blades WDL  Spine Abrasion  (R) Arm/Elbow/Hand WDL  (L) Arm/Elbow/Hand WDL  Abdomen WDL  Groin WDL  Scrotum/Coccyx/Buttocks WDL  (R) Leg Scar and Scab  (L) Leg Scar and Scab  (R) Heel/Foot/Toe Blanching, calloused  (L) Heel/Foot/Toe Blanching, calloused    Devices In Places ECG, Blood Pressure Cuff, Pulse Ox, Hood, Arterial Line, ET Tube, OG/NG, Central Line, and BMS      Interventions In Place Heel Mepilex, Sacral Mepilex, TAP System, Pillows, Elbow Mepilex, Q2 Turns, Low Air Loss Mattress, ZFlo Pillow, and Heels Loaded W/Pillows    Possible Skin Injury No    Pictures Uploaded Into Epic N/A  Wound Consult Placed N/A  RN Wound Prevention Protocol Ordered No

## 2024-06-12 NOTE — PROGRESS NOTES
Critical Care Progress Note    Date of admission  6/10/2024    Chief Complaint  39 y.o. male admitted 6/10/2024 with cardiac arrest    Hospital Course  Mr. Sands is a 39 year old male with no known past medical history who was found down by his brother on 6/10/2024 with unknown downtime.  The brother initiated CPR and EMS was activated.  When EMS arrived, they found the patient in asystole requiring 2 rounds of CPR with epinephrine before ROSC was achieved.  Unfortunately, the patient lost pulses again and arrived to the ER with CPR in progress receiving more epinephrine and bicarbonate for severe acidemia.  The patient's work up revealed a urine drug screen positive for amphetamines, cannabinoids, and fentanyl.  He was admitted to the ICU for ongoing care.  6/11 - VD#2, no gag/cough/corneal, decorticate posturing with sternal rub, worsening renal function, updated family of very poor prognosis    Interval Problem Update  Reviewed last 24 hour events:   - pt spiked a fever last night-->started Mg, cooling pads, Vec started-->stopped today   - fent 100   - prop at 30   - not w/d'ing to pain   - pupils pinpoint   - Tmax 37.8   - -120s   - -170s   - bicarb at 150cc/hr   - NG with TFs at goal   - BMS with 20cc ovenright   - UOP of 120cc overnight, Alcaraz   - left IJ TLX   - level 1 mobility   - VD#3   - CXR(reviewed): increased cephalization   - no SBT   - pepcid   - heparin   - day 3/5 of Unasyn   - BAL: negative   - WBCs 15.9   - K 4.2   - creat 4.36   - AST/ALT 2520/3745   - CPK 42733   - Mg 3.5    Yesterday's Events:   - hypertensive overnight   - labetalol given   - GCS of 3, psoturing with sternal rub   - no sedation   - Tmax 37.8   - -110s   - -200s   - bicarb at 150cc/hr   - BMS of 400cc overnight   - UOP of 180cc overnight, alcaraz   - NPO   - right radial art line   - VD#2   - CXR(reviewed): slight cephalization   - no SBT   - day 2/5 of unasyn   - pepcid   - heparin   - CPK >  22,000   - WBCs 18   - K 4.2   - creat 3.06   - AST > 7000   - ALT 6503   - ammonia 39   - INR 1.65   - platelets 206    Review of Systems  Review of Systems   Unable to perform ROS: Intubated        Vital Signs for last 24 hours   Temp:  [36.7 °C (98.1 °F)-38.1 °C (100.6 °F)] 36.8 °C (98.2 °F)  Pulse:  [101-129] 115  Resp:  [31-50] 32  BP: (109-168)/() 155/98  SpO2:  [97 %-100 %] 100 %    Hemodynamic parameters for last 24 hours       Respiratory Information for the last 24 hours  Vent Mode: APVCMV  Rate (breaths/min): 32  Vt Target (mL): 570  PEEP/CPAP: 8  MAP: 13  Control VTE (exp VT): 573    Physical Exam   Physical Exam  Vitals and nursing note reviewed.   Constitutional:       General: He is not in acute distress.     Appearance: He is ill-appearing and toxic-appearing.   HENT:      Head: Normocephalic and atraumatic.      Right Ear: External ear normal.      Left Ear: External ear normal.      Nose: Nose normal. No rhinorrhea.      Mouth/Throat:      Mouth: Mucous membranes are moist.      Comments: ETT in place  Eyes:      General: No scleral icterus.     Extraocular Movements:      Right eye: No nystagmus.      Left eye: No nystagmus.      Conjunctiva/sclera:      Right eye: Right conjunctiva is injected. Chemosis present.      Left eye: Left conjunctiva is injected. Chemosis present.      Comments: Pupils pinpoint   Neck:      Comments: Left IJ TLC (6/10)  Cardiovascular:      Rate and Rhythm: Regular rhythm. Tachycardia present.      Pulses: Normal pulses.      Heart sounds: No murmur heard.  Pulmonary:      Breath sounds: No wheezing.      Comments: Coarse breath sounds heard throughout  Chest:      Chest wall: No tenderness.   Abdominal:      Palpations: Abdomen is soft.      Tenderness: There is no abdominal tenderness. There is no guarding or rebound.   Genitourinary:     Comments: Hood in place  Musculoskeletal:      Cervical back: Normal range of motion and neck supple.      Right lower leg:  No edema.      Left lower leg: No edema.   Lymphadenopathy:      Cervical: No cervical adenopathy.   Skin:     General: Skin is warm and dry.      Capillary Refill: Capillary refill takes less than 2 seconds.      Findings: No rash.   Neurological:      Comments: no cough/gag/corneal, no posturing or withdrawal with sternal rub   Psychiatric:      Comments: Unable to assess         Medications  Current Facility-Administered Medications   Medication Dose Route Frequency Provider Last Rate Last Admin    acetaminophen (Tylenol) tablet 650 mg  650 mg Enteral Tube Q6HRS PRN Viri Pina M.D.        ampicillin/sulbactam (Unasyn) 3 g in  mL IVPB  3 g Intravenous Q6HRS Pedrito Gross M.D.   Stopped at 06/12/24 0558    sodium bicarbonate 150 mEq in D5W infusion (premix)   Intravenous Continuous Viri Pina M.D. 150 mL/hr at 06/12/24 0219 New Bag at 06/12/24 0219    hydrALAZINE (Apresoline) injection 20 mg  20 mg Intravenous Q4HRS PRN Viri Pina M.D.        labetalol (Normodyne/Trandate) injection 10-20 mg  10-20 mg Intravenous Q2HRS PRN Viri Pina M.D.   20 mg at 06/11/24 1335    heparin injection 5,000 Units  5,000 Units Subcutaneous Q8HRS Viri Pina M.D.   5,000 Units at 06/12/24 0530    carboxymethylcellulose (Refresh Tears) 0.5 % ophthalmic drops 1 Drop  1 Drop Both Eyes Q2HRS PRN Juju Ryan M.D.   1 Drop at 06/11/24 1600    busPIRone (Buspar) tablet 15 mg  15 mg Enteral Tube BID GLEN WeemsOTim   15 mg at 06/12/24 0522    fentaNYL (Sublimaze) injection 100 mcg  100 mcg Intravenous Q HOUR PRN GLEN WeemsOTim   100 mcg at 06/11/24 2328    fentanyl 50 mcg/mL infusion   mcg/hr Intravenous Continuous GLEN WeemsO. 4 mL/hr at 06/12/24 0137 200 mcg/hr at 06/12/24 0137    propofol (DIPRIVAN) injection  0-80 mcg/kg/min (Ideal) Intravenous Continuous GLEN WeemsOTim 32.8 mL/hr at 06/12/24 0602 80 mcg/kg/min at 06/12/24 0602    Respiratory Therapy Consult   Nebulization  Continuous RT Pedrito Gross M.D.        famotidine (Pepcid) tablet 20 mg  20 mg Enteral Tube Q24HRS Pedrito Gross M.D.   20 mg at 06/12/24 0522    senna-docusate (Pericolace Or Senokot S) 8.6-50 MG per tablet 2 Tablet  2 Tablet Enteral Tube BID Pedrito Gross M.D.   2 Tablet at 06/12/24 0522    And    polyethylene glycol/lytes (Miralax) Packet 1 Packet  1 Packet Enteral Tube QDAY PRN Perdito Gross M.D.        And    magnesium hydroxide (Milk Of Magnesia) suspension 30 mL  30 mL Enteral Tube QDAY PRN Pedrito Gross M.D.        And    bisacodyl (Dulcolax) suppository 10 mg  10 mg Rectal QDAY PRN Pedrito Gross M.D.        lidocaine (Xylocaine) 1 % injection 2 mL  2 mL Tracheal Tube Q30 MIN PRN Pedrito Gross M.D.        Pharmacy Consult: Enteral tube insertion - review meds/change route/product selection  1 Each Other PHARMACY TO DOSE Pedrito Gross M.D.        HYDROmorphone (Dilaudid) injection 0.5-1 mg  0.5-1 mg Intravenous Q HOUR PRN Pedrito Gross M.D.   1 mg at 06/11/24 2108    midazolam (Versed) injection 1-5 mg  1-5 mg Intravenous Q HOUR PRN Pedrito Gross M.D.        ipratropium-albuterol (DUONEB) nebulizer solution  3 mL Nebulization Q2HRS PRN (RT) Pedrito Gross M.D.        norepinephrine (Levophed) 8 mg in 250 mL NS infusion (premix)  0-1 mcg/kg/min Intravenous Continuous Pedrito Gross M.D.   Infusion Ended Prior to Shift at 06/11/24 0700    EPINEPHrine (Adrenalin) infusion 4 mg/250 mL (premix)  0-0.5 mcg/kg/min Intravenous Continuous Pedrito Gross M.D.   Infusion Ended Prior to Shift at 06/11/24 0700    insulin regular (HumuLIN R,NovoLIN R) injection  1-6 Units Subcutaneous Q6HRS Pedrito Gross M.D.   2 Units at 06/10/24 1831    And    dextrose 10 % BOLUS 25 g  25 g Intravenous Q15 MIN PRN Pedrito Gross M.D.        thiamine (B-1) 250 mg in dextrose 5% 100 mL IVPB  250 mg Intravenous Q12HRS Pedrito Gross M.D. 200 mL/hr at 06/12/24 0530 250 mg at 06/12/24 0530        Fluids    Intake/Output Summary (Last 24 hours) at 6/12/2024 0633  Last data filed at 6/12/2024 0627  Gross per 24 hour   Intake 2943.11 ml   Output 395 ml   Net 2548.11 ml       Laboratory  Recent Labs     06/10/24  1825 06/11/24  0344 06/12/24  0416   ISTATAPH 7.303* 7.362* 7.340*   ISTATAPCO2 26.7 30.8 46.6*   ISTATAPO2 242* 159* 97*   ISTATATCO2 14* 18* 27   IIPOSOC9FQK 100* 99 97   ISTATARTHCO3 13.2* 17.5 25.1*   ISTATARTBE -11* -7* -1   ISTATTEMP 37.8 C 36.5 C 36.6 C   ISTATFIO2 70 40 30   ISTATSPEC Arterial Arterial Arterial   ISTATAPHTC 7.291* 7.369* 7.346*   [id number] 246* 156* 95*     Recent Labs     06/11/24  0604 06/12/24  0510   CPKTOTAL >05903* 95566*     Recent Labs     06/11/24  0428 06/11/24  1215 06/11/24  1636 06/11/24  2319 06/12/24  0510   SODIUM 135   < > 136 137 136   POTASSIUM 4.2   < > 4.2 5.4 4.9   CHLORIDE 102   < > 101 99 95*   CO2 15*   < > 16* 21 23   BUN 37*   < > 52* 58* 66*   CREATININE 3.06*   < > 3.97* 4.07* 4.36*   MAGNESIUM 2.0   < > 2.2 2.7* 3.5*   PHOSPHORUS 4.0  --   --   --  7.3*   CALCIUM 7.8*   < > 7.4* 7.4* 7.3*    < > = values in this interval not displayed.     Recent Labs     06/10/24  0729 06/10/24  1240 06/11/24  0337 06/11/24  0428 06/11/24  1215 06/11/24  1636 06/11/24  2319 06/12/24  0510   ALTSGPT 3400*  --   --  6503*  --   --  4343* 3745*   ASTSGOT 3716*  --   --  >7000*  --   --  3627* 2520*   ALKPHOSPHAT 75  --   --  117*  --   --  120* 120*   TBILIRUBIN <0.2  --   --  1.4  --   --  1.3 1.4   DBILIRUBIN  --   --   --   --   --   --  0.9*  --    PREALBUMIN 12.3*  --  12.7*  --   --   --   --   --    GLUCOSE 48*   < >  --  111*   < > 133* 105* 102*    < > = values in this interval not displayed.     Recent Labs     06/10/24  0729 06/11/24  0428 06/11/24  2319 06/12/24  0510   WBC 6.5 18.5*  --  15.9*   NEUTSPOLYS 77.40* 89.80*  --  86.20*   LYMPHOCYTES 15.40* 3.40*  --  5.20*   MONOCYTES 6.10 5.10  --  8.60   EOSINOPHILS 0.00 0.00  --  0.00   BASOPHILS  0.20 0.00  --  0.00   ASTSGOT 3716* >7000* 3627* 2520*   ALTSGPT 3400* 6503* 4343* 3745*   ALKPHOSPHAT 75 117* 120* 120*   TBILIRUBIN <0.2 1.4 1.3 1.4     Recent Labs     06/10/24  0729 06/11/24  0428 06/11/24  0604 06/12/24  0510   RBC 4.48* 5.33  --  4.70   HEMOGLOBIN 12.1* 14.4  --  12.8*   HEMATOCRIT 40.7* 42.7  --  36.8*   PLATELETCT 222 206  --  185   PROTHROMBTM  --   --  19.7*  --    APTT  --   --  31.1  --    INR  --   --  1.65*  --        Imaging  ECHO:  CONCLUSIONS  Severely reduced left ventricular systolic function.  The left ventricular ejection fraction is visually estimated to be less than 10-15%.  Unable to accurately estimate diastolic function.  The right ventricle is not well visualized, but appears dilated with severe reduced systolic function.  Dilated inferior vena cava without inspiratory collapse.    Assessment/Plan  * Cardiac arrest (HCC)- (present on admission)  Assessment & Plan  Found unresponsive in asystole  Suspected fentanyl overdose with UDS + for amphetamines, fentanyl, cannabinoids  No acute ischemic change on EKG, CTA negative for PE, head CT unremarkable  Continue postarrest care with normothermia protocol, maintain euvolemia, lung protective ventilation, eucapnia, euglycemia  ECHO with Bi-V failure    Non-traumatic rhabdomyolysis- (present on admission)  Assessment & Plan  ?due to prolonged downtime vs amphetamine use  Trend CPK  Bicarbonate infusion    Shock (HCC)  Assessment & Plan  Undifferentiated shock post asystolic cardiac arrest  MAP goals > 65,  I am actively titrating vasopressors for MAP goal  ECHO with reduced biventricular function  No evidence of PE, tamponade or acute ischemic change    Acute encephalopathy  Assessment & Plan  Suspicion for anoxic encephalopathy due to out of hospital cardiac arrest with unknown downtime  Toxic encephalopathy due to drug ingestion remains in differential  Continue supportive care, normothermia protocol  Initial head CT  unremarkable  Stat EEG  Follow-up NSE and further prognostication after 72 hours  Likely MRI brain as appropriate interval  Due to worsening clinical exam, concern for progression to brain death    Lactic acidosis  Assessment & Plan  Secondary to cardiac arrest  S/p IVF resuscitation and bicarbonate  Trend lactic acid level    Transaminitis- (present on admission)  Assessment & Plan  Suspect ischemic hepatopathy due to cardiac arrest  Hepatitis panel was negative  May need RUQ US  Monitor ammonia/INR    Acute kidney injury (HCC)  Assessment & Plan  Suspect component of ATN from cardiac arrest  Limit nephrotoxins  Monitor UOP/creat  May need nephrology consult, but suspect not an RRT candidate due to severe neurological injury from cardiac arrest    Acute respiratory failure with hypoxia (HCC)  Assessment & Plan  Out of hospital cardiac arrest and intubated on 6/10/2024  Cont full ventilator support  RT/O2 protocols  Ventilator bundle protocols  I am actively adjusting ventilator settings based on ABGs/vent mechanics  S/p bronchoscopy with BAL on 6/10, cont empiric Unasyn, follow cultures  SAT/SBTs if clinically indicated            VTE:  Heparin  Ulcer: H2 Antagonist  Lines: Central Line  Ongoing indication addressed, Arterial Line  Ongoing indication addressed, and Hood Catheter  Ongoing indication addressed    I have performed a physical exam and reviewed and updated ROS and Plan today (6/12/2024). In review of yesterday's note (6/11/2024), there are no changes except as documented above.     Discussed patient condition and risk of morbidity and/or mortality with RN, RT, Pharmacy, and Charge nurse / hot rounds    The patient remains critically ill.  I have assessed and reassessed the respiratory status and made ventilator adjustments based upon arterial blood gas analysis, ventilator waveforms and airway mechanics.  I have assessed and reassessed the blood pressure, hemodynamics, cardiovascular status. This  patient remains at high risk for worsening cardiopulmonary dysfunction and death without the above critical care interventions.    I spent > 20 minutes in discussion with the patient's brother, sister, and mother at the bedside and explained the medical team's concerns for severe hypoxic brain injury with his unknown downtime.  I explained that there is a really high chance of progression to brain death.  Family demonstrated understanding.    Critical care time = 112 minutes in directly providing and coordinating critical care and extensive data review.  No time overlap and excludes procedures.

## 2024-06-12 NOTE — PROGRESS NOTES
Report received from OMAYRA Farrar. Patient intubated, not restrained, GCS of 3 prior to sedation, ETT verified. Max propofol and fentanyl gtts for shivering and normothermia. All gtts verified. Artic sun pads in place, work to cool low. Counter warming in place. TOF 4/4 at 6 on facial nerve. Per RN push dose vec given at 0600.

## 2024-06-12 NOTE — DIETARY
Nutrition Services Brief Update:    Problem: Nutritional:  Goal: Nutrition support tolerated and meeting greater than 85% of estimated needs  Outcome: Met    TF running at goal rate of 60 ml/hr.     RD following.

## 2024-06-12 NOTE — PROGRESS NOTES
Updated Dr. Pina on patients train of four. Okay per MD to titrate down fentanyl and propofol infusions.

## 2024-06-12 NOTE — CONSULTS
Referral # [id number]    Please call [phone] select 1, then select 2, and use the last 5 digits of the referral number to contact the on-call coordinator with any updates.     Date: 06/12/24      Thank you for the referral of this patient. A chart review has been completed to determine suitability for organ and tissue donation.    *Donation is an option:  -Upon meeting the criteria for brain death this patient will be a potential candidate for organ donation, upon further evaluation.  -This patient may meet the criteria for DCD (donation after circulatory death). Further evaluation will be needed should the family decide to transition to comfort care.  -This patient has been located on an organ donor registry and is a first person authorized organ donor, in the event of their death.     *[facility] will continue to follow this case.  -Please contact the clinical coordinator with any changes in the patient's neurological or hemodynamic status, family questions/concerns/decisions, or changes in plan of care.  -Organ donation should not be mentioned to the family until the physician has discussed the patient's diagnosis and grave prognosis. Discussion of organ donation should then be a planned, one-time coordinated event in collaboration with [facility].     Thank you for your continued support of organ and tissue donation.

## 2024-06-12 NOTE — PROCEDURES
INPATIENT ROUTINE VIDEO ELECTROENCEPHALOGRAM REPORT    REFERRING PROVIDER: Viri Pina M.d.    DOS: 06/12/24   ROOM: T624/00  TOTAL RECORDING TIME: 0 hours and 33 minutes of total recording time    INDICATION:  Luis Alfredo Sands 39 y.o. male presenting with  biting of ET tube and altered mental status    RELEVANT TREATMENTS/MEDICATIONS:  ampicillin-sulbactam (UNASYN) IV, 3 g, Q12HRS  artificial tears, 1 Application, Q8HRS  heparin, 5,000 Units, Q8HRS  busPIRone, 15 mg, BID  famotidine, 20 mg, Q24HRS  senna-docusate, 2 Tablet, BID  Pharmacy, 1 Each, PHARMACY TO DOSE  insulin regular, 1-6 Units, Q6HRS  thiamine, 250 mg, Q12HRS         propofol  sodium bicarbonate 150 meq in D5W 1000 mL, Last Rate: 150 mL/hr at 06/12/24 0939  fentaNYL, Last Rate: 200 mcg/hr (06/12/24 1324)        TECHNIQUE:   Routine VEEG was set up by a Neurodiagnostic technologist who performed education to the patient and staff. A minimum of 23 electrodes and 23 channel recording was setup and performed by Neurodiagnostic technologist, in accordance with the international 10-20 system. The study was reviewed in bipolar and referential montages. The recording examined the patient in the  drowsy/sleep and/or comatose state(s).     DESCRIPTION OF THE RECORD:  The background was discontinuous, low voltage, and composed of occasional bursts of delta activity.  The background was continuously and diffusely obscured by muscle and movement artifact.  Posterior dominant rhythm was not seen.  Reactivity was absent.  Stage changes were absent.    EEG Sleep: N2 sleep architecture was not seen.    ICTAL AND INTERICTAL FINDINGS:   No focal or generalized epileptiform activity noted.     No regional slowing or persistent focal asymmetries were seen.    No definite seizures.     ACTIVATION PROCEDURES:   NA    EKG: Sampling of the EKG recording showed continuous tachycardia    EVENTS:    On video review there was recurrent subtle movements of the mouth, biting of  ET tube, clenching of jaw.  These events were not associated with any epileptic phenomena.    INTERPRETATION:  Abnormal, technically-limited video EEG recording in the drowsy/sleep and/or comatose state(s):  -Severe background slowing and attenuation suggestive of diffuse/multifocal cerebral dysfunction and/or underlying structural abnormality.  The effects of sedation may be contributing.  Clinical and radiographic correlation recommended.  -No persistent focal or regional slowing and no background asymmetries were seen.   -No definitive epileptiform discharges were seen.  -No definitive seizures.   -Clinical Events: On video review there was recurrent subtle movements of the mouth, biting of ET tube, clenching of jaw.  These events were not associated with any epileptic phenomena.         Mitch Zimmer MD  Department of Neurology at [facility]  General Neurologist and Epileptologist  Director of [facility]'s Level III Comprehensive Epilepsy Program  Professor of Clinical Neurology, [facility].   Phone: [phone]  Fax: [phone]  E-mail: [email or web address]

## 2024-06-12 NOTE — PROGRESS NOTES
Updated Dr. Pina during IDT rounds about new onset shivering with propofol off and fentanyl infusion down to 100 mcg/hr. Tylenol given. Per MD okay to continue using fentanyl gtt for shivering management. Goal SBP <160.

## 2024-06-12 NOTE — PROGRESS NOTES
1400: Updated Dr. Pina and Dr. Ryan regarding low urine output, creatinine increasing, no new orders received.    1600: Updated Dr. Perry and Dr. Ryan:  Patient no longer withdrawing to pain at upper extremities with 1600 assessment. Pupils no longer reactive to light, fixed at 2-3mm. Patient does still breath over the ventilator occasionally.  No new orders received.

## 2024-06-12 NOTE — CARE PLAN
Problem: Ventilation  Goal: Ability to achieve and maintain unassisted ventilation or tolerate decreased levels of ventilator support  Description: Target End Date:  4 days     Document on Vent flowsheet    1.  Support and monitor invasive and noninvasive mechanical ventilation  2.  Monitor ventilator weaning response  3.  Perform ventilator associated pneumonia prevention interventions  4.  Manage ventilation therapy by monitoring diagnostic test results  Outcome: Progressing     Ventilator Daily Summary    Vent Day #3  Airway: 8 @24    Ventilator settings: 32/570/8/30  Weaning trials: no  Respiratory Procedures: no      Plan: Continue current ventilator settings and wean mechanical ventilation as tolerated per physician orders.

## 2024-06-12 NOTE — CARE PLAN
Problem: Ventilation  Goal: Ability to achieve and maintain unassisted ventilation or tolerate decreased levels of ventilator support  Description: Target End Date:  4 days     Document on Vent flowsheet    1.  Support and monitor invasive and noninvasive mechanical ventilation  2.  Monitor ventilator weaning response  3.  Perform ventilator associated pneumonia prevention interventions  4.  Manage ventilation therapy by monitoring diagnostic test results  Outcome: Not Met                                                                 Ventilatory Daily Summary     Vent day #3  Airway: 8/24    Vent settings: 32/570/8/30  Weaning trials: NA  Resp procedures: NA

## 2024-06-12 NOTE — PROGRESS NOTES
4 Eyes Skin Assessment Completed by OMAYRA Gaming and OMAYRA Patel    Skin assessment is primarily focused on high risk bony prominences. Pay special attention to skin beneath and around medical devices, high risk bony prominences, skin to skin areas and areas where the patient lacks sensation to feel pain and areas where the patient previously had breakdown.     HIGH RISK PRESSURE POINTS -    Occipital Region WDL  Right Ear WDL  Left Ear WDL  Sacrum/Coccyx Pink and Blanching  Right ischial tuberosity (Sit Bones) WDL  Left Ischial Tuberosity (Sit Bones) WDL  Right Heel  calloused  Left Heel  calloused  Other: redness/indents around normothermia pads on chest, repositioned, blanching  spine abrasion, covered with mepilex    Skin Risk/Reggie   Sensory Perception: Completely Limited  Moisture: Occasionally Moist  Activity: Bedfast  Mobility: Completely Immobile  Nutrition: Adequate  Friction and Shear: Potential Problem  Total Score: 11  Skin Breakdown Risk: 10-12 High Risk for Skin Breakdown    Sensory Interventions  Bed Types: ICU Low Airloss  Skin Preventative Measures: Heel Protectors in Use , Pillows in Use to Float Heels, Elbow Protectors In Use, Pillows in Use for Support / Positioning  Skin Preventative Dressing: Foam Sacral Protector  Moisturizers/Barriers: Barrier Paste, Barrier Wipes, Containment Devices     Activity : Bed  Patient Turns / Repositioning: Left Side, Reposition - RUE, Reposition - LUE, Reposition - RLE, Reposition - LLE  Patient is Receiving Nutrition: Tube Feeding Nutrition     Vitamin Therapy in Use: No  Friction Interventions: Draw Sheet / Pad Used for Repositioning, Sacral Foam Dressing in Use                                  PROTOCOL INTERVENTIONS:   Elbows Padded with offloading dressing Already in place  Mepilex on spinal abrasion , already in place  Heels and elbows offloaded with dressings, already in place  Heels offloaded with pillows, in place this shift  Q2hr turns in place with  pillows, already in place  ICU airloss mattress, already in place    WOUND PHOTOS:   NA    WOUND CONSULT:   N/A, no advanced wound care needs identified

## 2024-06-12 NOTE — PROGRESS NOTES
[facility] ICU Progress Note      Admit Date: 6/10/2024    Resident(s): Juju Ryan M.D.   Attending:  ARACELIS FITZGERALD/ Dr. Pina    Patient ID:    Name:  Luis Alfredo Sands     YOB: 1985  Age:  39 y.o.  male   MRN:  [id number]    Hospital Course (carried forward and updated):  Luis Alfredo Sands is a 39 y.o. male with a PMH of polysubstance use disorder, admitted 06/10/2024 for cardiac arrest.    Consultants:  Critical Care  Cardiology     Interval Events:    06/11 - Hypertensive overnight, has not required pressor therapy. Intermittent episodes of myoclonus, EEG without evidence of seizures. Off sedation, no corneal or gag reflex, responsive to pain in upper extremities only.     06/12 - Febrile overnight, shivering, normothermia protocol initiated. No corneal, cough, or gag reflex. Now unresponsive to pain in all extremities. Fixed pupils. Now off propofol.    Vitals Range last 24h:  Temp:  [36.4 °C (97.5 °F)-38.1 °C (100.6 °F)] 36.9 °C (98.4 °F)  Pulse:  [101-129] 113  Resp:  [31-50] 32  BP: (109-155)/() 155/98  SpO2:  [97 %-100 %] 100 %      Intake/Output Summary (Last 24 hours) at 6/12/2024 1107  Last data filed at 6/12/2024 0853  Gross per 24 hour   Intake 3308.81 ml   Output 345 ml   Net 2963.81 ml        Review of Systems   Reason unable to perform ROS: Intubated.       PHYSICAL EXAM:  Vitals:    06/12/24 0945 06/12/24 1000 06/12/24 1015 06/12/24 1030   BP:       Pulse: (!) 113 (!) 113 (!) 113 (!) 113   Resp: (!) 32 (!) 32 (!) 32 (!) 32   Temp:       TempSrc:       SpO2: 100% 100% 100% 100%   Weight:       Height:        Body mass index is 25.35 kg/m².    O2 therapy: Pulse Oximetry: 100 %, O2 Delivery Device: Ventilator    Date 06/12/24 0700 - 06/13/24 0659   Shift 1180-5744 5549-2103 2002-4192 24 Hour Total   INTAKE   I.V. 85.9   85.9     Fentanyl Volume 7.8   7.8     Magnesium Sulfate Volume 0   0     Vecuronium  Volume 0   0     Propofol Volume 78.1   78.1     Volume (mL) (sodium bicarbonate  150 mEq in D5W infusion (premix)) 0   0   Other         Balloon Inflated (mL) (Rectal Tube) 1 x   1 x   NG/   120     Intake (mL) (Enteral Tube 06/10/24 Nasogastric 16 Fr. Right nare) 120   120   IV Piggyback 0   0     Volume (mL) (ampicillin/sulbactam (Unasyn) 3 g in  mL IVPB) 0   0   Shift Total 205.9   205.9   OUTPUT   Urine 10   10     Output (mL) (Urethral Catheter) 10   10   Stool 0   0     Rectal Tube Output (Rectal Tube) 0   0   Shift Total 10   10   .9   195.9        Physical Exam  Constitutional:       Appearance: He is normal weight. He is ill-appearing.      Interventions: He is intubated.   HENT:      Head: Normocephalic and atraumatic.   Eyes:      General: No scleral icterus.     Conjunctiva/sclera: Conjunctivae normal.   Cardiovascular:      Rate and Rhythm: Regular rhythm. Tachycardia present.      Heart sounds: No murmur heard.  Pulmonary:      Effort: He is intubated.      Breath sounds: Rhonchi present. No wheezing.   Abdominal:      General: Abdomen is flat. Bowel sounds are normal.      Palpations: Abdomen is soft.   Musculoskeletal:      Right lower leg: No edema.      Left lower leg: No edema.   Skin:     General: Skin is warm and dry.   Neurological:      Mental Status: He is unresponsive.      Comments: Unresponsive to painful stimuli in all extremities, absent corneal/cough/gag reflex, does not follow commands.         Recent Labs     06/10/24  1825 06/11/24  0344 06/12/24  0416   ISTATAPH 7.303* 7.362* 7.340*   ISTATAPCO2 26.7 30.8 46.6*   ISTATAPO2 242* 159* 97*   ISTATATCO2 14* 18* 27   MHDFXQW0AQF 100* 99 97   ISTATARTHCO3 13.2* 17.5 25.1*   ISTATARTBE -11* -7* -1   ISTATTEMP 37.8 C 36.5 C 36.6 C   ISTATFIO2 70 40 30   ISTATSPEC Arterial Arterial Arterial   ISTATAPHTC 7.291* 7.369* 7.346*   [id number] 246* 156* 95*     Recent Labs     06/11/24  0428 06/11/24  1215 06/11/24  1636 06/11/24  2319 06/12/24  0510   SODIUM 135   < > 136 137 136   POTASSIUM 4.2   < > 4.2  5.4 4.9   CHLORIDE 102   < > 101 99 95*   CO2 15*   < > 16* 21 23   BUN 37*   < > 52* 58* 66*   CREATININE 3.06*   < > 3.97* 4.07* 4.36*   MAGNESIUM 2.0   < > 2.2 2.7* 3.5*   PHOSPHORUS 4.0  --   --   --  7.3*   CALCIUM 7.8*   < > 7.4* 7.4* 7.3*    < > = values in this interval not displayed.     Recent Labs     06/10/24  0729 06/10/24  1240 06/11/24  0337 06/11/24 0428 06/11/24  1215 06/11/24  1636 06/11/24 2319 06/12/24  0510   ALTSGPT 3400*  --   --  6503*  --   --  4343* 3745*   ASTSGOT 3716*  --   --  >7000*  --   --  3627* 2520*   ALKPHOSPHAT 75  --   --  117*  --   --  120* 120*   TBILIRUBIN <0.2  --   --  1.4  --   --  1.3 1.4   DBILIRUBIN  --   --   --   --   --   --  0.9*  --    PREALBUMIN 12.3*  --  12.7*  --   --   --   --   --    GLUCOSE 48*   < >  --  111*   < > 133* 105* 102*    < > = values in this interval not displayed.     Recent Labs     06/10/24  0729 06/11/24  0428 06/11/24  0604 06/12/24  0510   RBC 4.48* 5.33  --  4.70   HEMOGLOBIN 12.1* 14.4  --  12.8*   HEMATOCRIT 40.7* 42.7  --  36.8*   PLATELETCT 222 206  --  185   PROTHROMBTM  --   --  19.7*  --    APTT  --   --  31.1  --    INR  --   --  1.65*  --      Recent Labs     06/10/24  0729 06/11/24  0428 06/11/24  2319 06/12/24  0510   WBC 6.5 18.5*  --  15.9*   NEUTSPOLYS 77.40* 89.80*  --  86.20*   LYMPHOCYTES 15.40* 3.40*  --  5.20*   MONOCYTES 6.10 5.10  --  8.60   EOSINOPHILS 0.00 0.00  --  0.00   BASOPHILS 0.20 0.00  --  0.00   ASTSGOT 3716* >7000* 3627* 2520*   ALTSGPT 3400* 6503* 4343* 3745*   ALKPHOSPHAT 75 117* 120* 120*   TBILIRUBIN <0.2 1.4 1.3 1.4       Meds:   acetaminophen  650 mg      ampicillin-sulbactam (UNASYN) IV  3 g      sodium bicarbonate 150 meq in D5W 1000 mL   150 mL/hr at 06/12/24 0939    hydrALAZINE  20 mg      labetalol  10-20 mg      heparin  5,000 Units      carboxymethylcellulose  1 Drop      busPIRone  15 mg      fentaNYL  100 mcg      fentaNYL   mcg/hr 100 mcg/hr (06/12/24 0924)    propofol  0-80  mcg/kg/min (Ideal) Stopped (06/12/24 0851)    Respiratory Therapy Consult        famotidine  20 mg      senna-docusate  2 Tablet      And    polyethylene glycol/lytes  1 Packet      And    magnesium hydroxide  30 mL      And    bisacodyl  10 mg      lidocaine  2 mL      Pharmacy  1 Each      HYDROmorphone  0.5-1 mg      midazolam  1-5 mg      ipratropium-albuterol  3 mL      insulin regular  1-6 Units      And    dextrose bolus  25 g      thiamine  250 mg 250 mg (06/12/24 0530)        Procedures:  06/10 - Intubation, arterial line, central line, bronchoscopy with BAL    Imaging:  DX-CHEST-PORTABLE (1 VIEW)   Final Result         1.  No acute cardiopulmonary disease.      DX-CHEST-PORTABLE (1 VIEW)   Final Result         1.  No acute cardiopulmonary disease.   2.  Endotracheal tube terminates at T3, could be advanced 1 to 2 cm.      DX-CHEST-PORTABLE (1 VIEW)   Final Result         1.  No acute cardiopulmonary disease.      EC-ECHOCARDIOGRAM COMPLETE W/ CONT   Final Result      DX-CHEST-PORTABLE (1 VIEW)   Final Result      1.  Interval placement of left internal jugular central venous catheter.   2.  Interval placement of enteric sump tube.   3.  No evidence of procedure-related pneumothorax.      DX-ABDOMEN FOR TUBE PLACEMENT   Final Result      Enteric tube tip projects over the upper stomach      CT-CTA CHEST PULMONARY ARTERY W/ RECONS   Final Result      1.  No central or segmental pulmonary embolus   2.  BILATERAL dependent airspace disease airway fluid suspicious for aspiration pneumonia   3.  Hepatic steatosis   4.  Trace LEFT anterior chest wall gas of uncertain etiology            CT-HEAD W/O   Final Result      No acute intracranial abnormality.            DX-CHEST-PORTABLE (1 VIEW)   Final Result         1.  No acute cardiopulmonary disease.          ASSESSEMENT and PLAN:    * Cardiac arrest (HCC)- (present on admission)  Assessment & Plan  Likely secondary to overdose in the setting of polysubstance use  disorder. ECHO showed EF 10-15%, severely reduced LV systolic function.  -Normothermia protocol    Acute encephalopathy  Assessment & Plan  In the setting of cardiac arrest. EEG with findings of severe diffuse cerebral function.  -F/u NSE  -MRI brain 72 hours post-cardiac arrest    Acute respiratory failure with hypoxia (HCC)  Assessment & Plan  Secondary to aspiration pneumonia in the setting of cardiac arrest. CTA thorax with findings of bilateral dependent airspace disease with fluid in airways.   Intubated 06/10.  -Ampicillin/sulbactam    Lactic acidosis  Assessment & Plan  Secondary to cardiac arrest.  -Sodium bicarbonate gtt    Transaminitis- (present on admission)  Assessment & Plan  Secondary to ischemic hepatitis in the setting of cardiac arrest. Hepatitis panel negative.  -Avoid hepatotoxins    Acute kidney injury (HCC)  Assessment & Plan  Secondary to ATN in the setting of cardiac arrest. Cr 2.35 on admission -> 4.36.  -Monitor  -Avoid nephrotoxins, renally dose medications    Polysubstance use disorder  Assessment & Plan  UDS positive for amphetamines, cannabinoids, fentanyl.         DISPO: Remains inpatient ICU    CODE STATUS: FULL CODE    Quality Measures:  Feeding: Tube feeds  Analgesia: Fentanyl  Sedation: Propofol  Thromboprophylaxis: None  Head of bed: >30 degrees  Ulcer prophylaxis: Famotidine  Glycemic control: Correctional: Insulin regular / Basal: None  Bowel care: Bowel regimen  Indwelling lines: Central line, arterial line  Deescalation of antibiotics: Ampicillin/sulbactam      Juju Ryan M.D.

## 2024-06-12 NOTE — PROGRESS NOTES
Pt triggered normothermia protocol step 2 with temp of 37.9. Arctic Sun therapy initiated 1947. Elements of normothermia protocol found to be missing and/or discontinued from active orders. Updated Dr. Perry that Arctic Sun pads were placed and that step 2 has been initiated. Discussed missing elements of normothermia protocol. Per Dr. Perry, ok to reorder full normothermia protocol except for acetaminophen (d/t elevated liver enzymes), EEG (d/t discrepancy in medical record about whether EEG has been completed or not), and EKG and head CT (already completed). Physician progress note referenced f/u NSE, however NSE never drawn per lab chart review. NSE reordered as part of protocol, will redraw now.

## 2024-06-12 NOTE — CARE PLAN
The patient is Watcher - Medium risk of patient condition declining or worsening    Shift Goals  Clinical Goals: temp < 37, TTM, q4h neuro checks  Patient Goals: RAGINI  Family Goals: Updates    Problem: Hemodynamics  Goal: Patient's hemodynamics, fluid balance and neurologic status will be stable or improve  Outcome: Progressing  Note: Pt hemodynamically stable this shift.      Problem: Pain - Standard  Goal: Alleviation of pain or a reduction in pain to the patient’s comfort goal  Outcome: Progressing  Note: Monitored for s/sx of pain; pt remains unresponsive.        Progress made toward(s) clinical / shift goals:  Moved through TTM normothermia protocol, assessed neuro status q4h.

## 2024-06-13 PROBLEM — Z71.89 GOALS OF CARE, COUNSELING/DISCUSSION: Status: ACTIVE | Noted: 2024-01-01

## 2024-06-13 NOTE — PROGRESS NOTES
Monitor Summary:  Sinus Rhythm, Sinus Tachycardia 90-100s   NH 0.138 / QRS 0.069 /  QTC 0.397

## 2024-06-13 NOTE — PROGRESS NOTES
4 Eyes Skin Assessment Completed by OMAYRA Up and OMAYRA Casas.    Head WDL  Ears WDL  Nose WDL  Mouth WDL  Neck WDL  Breast/Chest WDL  Shoulder Blades Redness and Blanching  Spine Redness and Blanching, abrasion  (R) Arm/Elbow/Hand Redness, Blanching, and Swelling  (L) Arm/Elbow/Hand Redness, Blanching, and Swelling  Abdomen WDL  Groin WDL  Scrotum/Coccyx/Buttocks Redness and Blanching  (R) Leg Swelling  (L) Leg Swelling  (R) Heel/Foot/Toe Redness and Blanching, calloused    (L) Heel/Foot/Toe Redness and Blanching, calloused           Devices In Places ECG, Blood Pressure Cuff, Pulse Ox, Hood, Arterial Line, SCD's, ET Tube, Central Line, and BMS      Interventions In Place Heel Mepilex, Sacral Mepilex, TAP System, Pillows, Elbow Mepilex, Q2 Turns, Low Air Loss Mattress, Barrier Cream, and Heels Loaded W/Pillows    Possible Skin Injury No    Pictures Uploaded Into Epic N/A  Wound Consult Placed Yes  RN Wound Prevention Protocol Ordered Yes

## 2024-06-13 NOTE — PROGRESS NOTES
Critical Care Progress Note    Date of admission  6/10/2024    Chief Complaint  39 y.o. male admitted 6/10/2024 with cardiac arrest    Hospital Course  Mr. Sands is a 39 year old male with no known past medical history who was found down by his brother on 6/10/2024 with unknown downtime.  The brother initiated CPR and EMS was activated.  When EMS arrived, they found the patient in asystole requiring 2 rounds of CPR with epinephrine before ROSC was achieved.  Unfortunately, the patient lost pulses again and arrived to the ER with CPR in progress receiving more epinephrine and bicarbonate for severe acidemia.  The patient's work up revealed a urine drug screen positive for amphetamines, cannabinoids, and fentanyl.  He was admitted to the ICU for ongoing care.  6/11 - VD#2, no gag/cough/corneal, decorticate posturing with sternal rub, worsening renal function, updated family of very poor prognosis  6/12 - VD#3, no gag/cough/corneal, no posturing or w/d with noxious stimuli, creat now at 5, mother and sister in town-->updated with concerns that patient may proceed to brain death    Interval Problem Update  Reviewed last 24 hour events:   - pt with hypotension in the 70-80s for about 10 minutes and hypertensive getting prns   - stopped sedation this morning: no cough, gag, pt with twitching of eye lids making assessment for corneal limited: no corneal on left   - no w/d or posturing with sternal rub   - [place] ongoing, Tmax 98.8   - pupils pinpoint: not reactive   - SR/ST 90-100s   - -160s   - Bicarb at 150cc/hr-->D/C   - NG with TFs at goal   - UOP of 175 cc overnight, Hood    - BMS: no output overnight   - left IJ TLC   - VD#4   - no CXR today   - SBT-->still triggers, very low volumes   - ABG of 7.6/30/100-->rate dropped from 32 to 26   - repeat 7.66/28/111-->stopped bicarb   - pepcid   - heparin   - day 4/5 of Unasyn   - BAL: negative   - WBCs 11   - K 3.6   - creat 6.27   - AST//2282   - CPK  16137   - phos 4.3   - Mg 3.5   - Ca of 6.8    **sedation resumed  for shivering with only propofol at 30.  STAT MRI brain ordered.  Palliative consulted    Yesterday's Events:   - pt spiked a fever last night-->started Mg, cooling pads, Vec started-->stopped today   - fent 100   - prop at 30   - not w/d'ing to pain   - pupils pinpoint   - Tmax 37.8   - -120s   - -170s   - bicarb at 150cc/hr   - NG with TFs at goal   - BMS with 20cc ovenright   - UOP of 120cc overnight, Hood   - left IJ TLX   - level 1 mobility   - VD#3   - CXR(reviewed): increased cephalization   - no SBT   - pepcid   - heparin   - day 3/5 of Unasyn   - BAL: negative   - WBCs 15.9   - K 4.2   - creat 4.36   - AST/ALT 2520/3745   - CPK 22150   - Mg 3.5    Review of Systems  Review of Systems   Unable to perform ROS: Intubated        Vital Signs for last 24 hours   Temp:  [36.4 °C (97.5 °F)-37.1 °C (98.8 °F)] 36.9 °C (98.4 °F)  Pulse:  [] 92  Resp:  [26-33] 26  SpO2:  [99 %-100 %] 100 %    Hemodynamic parameters for last 24 hours       Respiratory Information for the last 24 hours  Vent Mode: APVCMV  Rate (breaths/min): 26  Vt Target (mL): 570  PEEP/CPAP: 8  MAP: 15  Control VTE (exp VT): 569    Physical Exam   Physical Exam  Vitals and nursing note reviewed.   Constitutional:       General: He is not in acute distress.     Appearance: He is ill-appearing and toxic-appearing.   HENT:      Head: Normocephalic and atraumatic.      Right Ear: External ear normal.      Left Ear: External ear normal.      Nose: Nose normal. No rhinorrhea.      Mouth/Throat:      Mouth: Mucous membranes are moist.      Comments: ETT in place  Eyes:      General: No scleral icterus.     Extraocular Movements:      Right eye: No nystagmus.      Left eye: No nystagmus.      Conjunctiva/sclera:      Right eye: Right conjunctiva is injected. Chemosis present.      Left eye: Left conjunctiva is injected. Chemosis present.      Comments: Pupils pinpoint    Neck:      Comments: Left IJ TLC (6/10)  Cardiovascular:      Rate and Rhythm: Regular rhythm. Tachycardia present.      Pulses: Normal pulses.      Heart sounds: No murmur heard.  Pulmonary:      Breath sounds: No wheezing.      Comments: Coarse breath sounds heard throughout  Chest:      Chest wall: No tenderness.   Abdominal:      Palpations: Abdomen is soft.      Tenderness: There is no abdominal tenderness. There is no guarding or rebound.   Genitourinary:     Comments: Hood in place  Musculoskeletal:      Cervical back: Normal range of motion and neck supple.      Right lower leg: No edema.      Left lower leg: No edema.   Lymphadenopathy:      Cervical: No cervical adenopathy.   Skin:     General: Skin is warm and dry.      Capillary Refill: Capillary refill takes less than 2 seconds.      Findings: No rash.   Neurological:      Comments: no cough/gag/corneal, no posturing or withdrawal with sternal rub   Psychiatric:      Comments: Unable to assess         Medications  Current Facility-Administered Medications   Medication Dose Route Frequency Provider Last Rate Last Admin    calcium CHLORIDE 10 % 1,000 mg in dextrose 5% 100 mL IVPB  1,000 mg Intravenous Once Viri Pina M.D.        acetaminophen (Tylenol) tablet 650 mg  650 mg Enteral Tube Q6HRS PRN Viri Pina M.D.   650 mg at 06/12/24 1503    ampicillin/sulbactam (Unasyn) 3 g in  mL IVPB  3 g Intravenous Q12HRS Viri Pina M.D.   Stopped at 06/13/24 0547    artificial tears (Eye Lubricant) ophth ointment 1 Application  1 Application Both Eyes Q8HRS Viri Pina M.D.   1 Application at 06/13/24 0524    propofol (DIPRIVAN) injection  0-80 mcg/kg/min (Ideal) Intravenous Continuous Viri Pina M.D. 20.5 mL/hr at 06/13/24 0513 50 mcg/kg/min at 06/13/24 0513    sodium bicarbonate 150 mEq in D5W infusion (premix)   Intravenous Continuous Viri Pina M.D. 150 mL/hr at 06/13/24 0127 New Bag at 06/13/24 0127     hydrALAZINE (Apresoline) injection 20 mg  20 mg Intravenous Q4HRS PRN Viri Pina M.D.        labetalol (Normodyne/Trandate) injection 10-20 mg  10-20 mg Intravenous Q2HRS PRN Viri Pina M.D.   10 mg at 06/12/24 2224    heparin injection 5,000 Units  5,000 Units Subcutaneous Q8HRS Viri Pina M.D.   5,000 Units at 06/13/24 0524    carboxymethylcellulose (Refresh Tears) 0.5 % ophthalmic drops 1 Drop  1 Drop Both Eyes Q2HRS PRN Juju Ryan M.D.   1 Drop at 06/11/24 1600    busPIRone (Buspar) tablet 15 mg  15 mg Enteral Tube BID Geoff Perry D.O.   15 mg at 06/13/24 0524    fentaNYL (Sublimaze) injection 100 mcg  100 mcg Intravenous Q HOUR PRN Geoff Perry D.O.   100 mcg at 06/11/24 2328    fentanyl 50 mcg/mL infusion   mcg/hr Intravenous Continuous Geoff Perry D.O. 4 mL/hr at 06/13/24 0550 200 mcg/hr at 06/13/24 0550    Respiratory Therapy Consult   Nebulization Continuous RT Pedrito Gross M.D.        famotidine (Pepcid) tablet 20 mg  20 mg Enteral Tube Q24HRS Pedrito Gross M.D.   20 mg at 06/13/24 0525    senna-docusate (Pericolace Or Senokot S) 8.6-50 MG per tablet 2 Tablet  2 Tablet Enteral Tube BID Pedrito Gross M.D.   2 Tablet at 06/13/24 0524    And    polyethylene glycol/lytes (Miralax) Packet 1 Packet  1 Packet Enteral Tube QDAY PRN Pedrito Gross M.D.        And    magnesium hydroxide (Milk Of Magnesia) suspension 30 mL  30 mL Enteral Tube QDAY PRN Pedrito Gross M.D.        And    bisacodyl (Dulcolax) suppository 10 mg  10 mg Rectal QDAY PRN Pedrito Gross M.D.        lidocaine (Xylocaine) 1 % injection 2 mL  2 mL Tracheal Tube Q30 MIN PRN Pedrito Gross M.D.        Pharmacy Consult: Enteral tube insertion - review meds/change route/product selection  1 Each Other PHARMACY TO DOSE Pedrito Gross M.D.        HYDROmorphone (Dilaudid) injection 0.5-1 mg  0.5-1 mg Intravenous Q HOUR PRN Pedriot Gross M.D.   1 mg at 06/11/24 2108    midazolam (Versed) injection 1-5 mg  1-5  mg Intravenous Q HOUR PRN Pedrito Gross M.D.        ipratropium-albuterol (DUONEB) nebulizer solution  3 mL Nebulization Q2HRS PRN (RT) Pedrito Gross M.D.        insulin regular (HumuLIN R,NovoLIN R) injection  1-6 Units Subcutaneous Q6HRS Pedirto Gross M.D.   2 Units at 06/10/24 1831    And    dextrose 10 % BOLUS 25 g  25 g Intravenous Q15 MIN PRN Pedrito Gross M.D.        thiamine (B-1) 250 mg in dextrose 5% 100 mL IVPB  250 mg Intravenous Q12HRS Pedrito Gross M.D. 200 mL/hr at 06/13/24 0521 250 mg at 06/13/24 0521       Fluids    Intake/Output Summary (Last 24 hours) at 6/13/2024 0630  Last data filed at 6/13/2024 0600  Gross per 24 hour   Intake 5624.47 ml   Output 195 ml   Net 5429.47 ml       Laboratory  Recent Labs     06/11/24  0344 06/12/24  0416 06/13/24  0447   ISTATAPH 7.362* 7.340* 7.624*   ISTATAPCO2 30.8 46.6* 29.6   ISTATAPO2 159* 97* 100*   ISTATATCO2 18* 27 32   YAFSPAT8SHL 99 97 99   ISTATARTHCO3 17.5 25.1* 30.8*   ISTATARTBE -7* -1 10*   ISTATTEMP 36.5 C 36.6 C 37.1 C   ISTATFIO2 40 30 30   ISTATSPEC Arterial Arterial Arterial   ISTATAPHTC 7.369* 7.346* 7.623*   PPDDCQLR6LO 156* 95* 100*     Recent Labs     06/11/24  0604 06/12/24  0510 06/13/24  0506   CPKTOTAL >23702* 53993* 94905*     Recent Labs     06/11/24  0428 06/11/24  1215 06/12/24  0510 06/12/24  1030 06/12/24  1706 06/12/24  2304 06/13/24  0506   SODIUM 135   < > 136   < > 132* 136 132*   POTASSIUM 4.2   < > 4.9   < > 4.6 3.9 3.6   CHLORIDE 102   < > 95*   < > 90* 90* 86*   CO2 15*   < > 23   < > 24 26 28   BUN 37*   < > 66*   < > 79* 77* 89*   CREATININE 3.06*   < > 4.36*   < > 5.57* 5.95* 6.27*   MAGNESIUM 2.0   < > 3.5*   < > 3.6* 3.6* 3.5*   PHOSPHORUS 4.0  --  7.3*  --   --   --  4.3   CALCIUM 7.8*   < > 7.3*   < > 7.9* 7.1* 6.8*    < > = values in this interval not displayed.     Recent Labs     06/10/24  0729 06/10/24  1240 06/11/24  0337 06/11/24  0428 06/11/24  2319 06/12/24  0510 06/12/24  1030 06/12/24  1706  06/12/24  2304 06/13/24  0506   ALTSGPT 3400*  --   --    < > 4343* 3745*  --   --   --  2282*   ASTSGOT 3716*  --   --    < > 3627* 2520*  --   --   --  900*   ALKPHOSPHAT 75  --   --    < > 120* 120*  --   --   --  114*   TBILIRUBIN <0.2  --   --    < > 1.3 1.4  --   --   --  1.4   DBILIRUBIN  --   --   --   --  0.9*  --   --   --   --   --    PREALBUMIN 12.3*  --  12.7*  --   --   --   --   --   --   --    GLUCOSE 48*   < >  --    < > 105* 102*   < > 119* 131* 125*    < > = values in this interval not displayed.     Recent Labs     06/11/24 0428 06/11/24 2319 06/12/24 0510 06/13/24  0506   WBC 18.5*  --  15.9* 11.0*   NEUTSPOLYS 89.80*  --  86.20* 79.90*   LYMPHOCYTES 3.40*  --  5.20* 11.10*   MONOCYTES 5.10  --  8.60 7.10   EOSINOPHILS 0.00  --  0.00 0.80   BASOPHILS 0.00  --  0.00 0.20   ASTSGOT >7000* 3627* 2520* 900*   ALTSGPT 6503* 4343* 3745* 2282*   ALKPHOSPHAT 117* 120* 120* 114*   TBILIRUBIN 1.4 1.3 1.4 1.4     Recent Labs     06/11/24 0428 06/11/24  0604 06/12/24  0510 06/13/24  0506   RBC 5.33  --  4.70 4.32*   HEMOGLOBIN 14.4  --  12.8* 11.9*   HEMATOCRIT 42.7  --  36.8* 32.2*   PLATELETCT 206  --  185 151*   PROTHROMBTM  --  19.7*  --   --    APTT  --  31.1  --   --    INR  --  1.65*  --   --        Imaging  ECHO:  CONCLUSIONS  Severely reduced left ventricular systolic function.  The left ventricular ejection fraction is visually estimated to be less than 10-15%.  Unable to accurately estimate diastolic function.  The right ventricle is not well visualized, but appears dilated with severe reduced systolic function.  Dilated inferior vena cava without inspiratory collapse.    Assessment/Plan  * Cardiac arrest (HCC)- (present on admission)  Assessment & Plan  Found unresponsive in asystole  Suspected fentanyl overdose with UDS + for amphetamines, fentanyl, cannabinoids  No acute ischemic change on EKG, CTA negative for PE, head CT unremarkable  Continue postarrest care with normothermia protocol,  maintain euvolemia, lung protective ventilation, eucapnia, euglycemia  ECHO with Bi-V failure    Goals of care, counseling/discussion  Assessment & Plan  6/12 - pt's mother and sister came from [place].  I gave them a detailed update of the patient's condition, prolonged downtime, concerns for severe non reversible brain injury due to hypoxia, and poor prognosis.  I explained that his exam was showing signs of brain death with lack of corneal/cough/gag reflexes and no w/d with painful stimuli.  Family demonstrated understanding and very emotional.  6/13 - pt's neuro exam unchanged.  I broached the subject of making the patient a DNR.  I consulted Palliative to assist with care.    Non-traumatic rhabdomyolysis- (present on admission)  Assessment & Plan  ?due to prolonged downtime vs amphetamine use  Trend CPK  Stop Bicarbonate infusion    Shock (HCC)  Assessment & Plan  Undifferentiated shock post asystolic cardiac arrest  MAP goals > 65,  I am actively titrating vasopressors for MAP goal  ECHO with reduced biventricular function  No evidence of PE, tamponade or acute ischemic change    Acute encephalopathy  Assessment & Plan  Suspicion for anoxic encephalopathy due to out of hospital cardiac arrest with unknown downtime  Toxic encephalopathy due to drug ingestion remains in differential  Continue supportive care, normothermia protocol  Initial head CT unremarkable  Stat EEG: no seizures  STAT MRI brain today  Due to worsening clinical exam, concern for progression to brain death    Lactic acidosis  Assessment & Plan  Resolved   Secondary to cardiac arrest  S/p IVF resuscitation and bicarbonate  Trend lactic acid level    Transaminitis- (present on admission)  Assessment & Plan  Suspect ischemic hepatopathy due to cardiac arrest  Hepatitis panel was negative  Improving     Acute kidney injury (HCC)  Assessment & Plan  Suspect component of ATN from cardiac arrest  Limit nephrotoxins  Monitor UOP/creat  May need  nephrology consult, but suspect not an RRT candidate due to severe neurological injury from cardiac arrest.  Stopped bicarb infusion due to alkalemia on ABG    Acute respiratory failure with hypoxia (HCC)  Assessment & Plan  Out of hospital cardiac arrest and intubated on 6/10/2024  Cont full ventilator support  RT/O2 protocols  Ventilator bundle protocols  I am actively adjusting ventilator settings based on ABGs/vent mechanics  S/p bronchoscopy with BAL on 6/10, cont empiric Unasyn, follow cultures  Holding sedation for neuro exam, SBTs with low volumes and low rate           VTE:  Heparin  Ulcer: H2 Antagonist  Lines: Central Line  Ongoing indication addressed, Arterial Line  Ongoing indication addressed, and Hood Catheter  Ongoing indication addressed    I have performed a physical exam and reviewed and updated ROS and Plan today (6/13/2024). In review of yesterday's note (6/12/2024), there are no changes except as documented above.     Discussed patient condition and risk of morbidity and/or mortality with Family, RN, RT, Pharmacy, Charge nurse / hot rounds, and palliative care    The patient remains critically ill.  I have assessed and reassessed the respiratory status and made ventilator adjustments based upon arterial blood gas analysis, ventilator waveforms and airway mechanics.  I have assessed and reassessed the blood pressure, hemodynamics, cardiovascular status. This patient remains at high risk for worsening cardiopulmonary dysfunction and death without the above critical care interventions.'    Critical care time = 115 minutes in directly providing and coordinating critical care and extensive data review.  No time overlap and excludes procedures.

## 2024-06-13 NOTE — PROGRESS NOTES
[facility] Progress Note      Admit Date: 6/10/2024    Resident(s): Juju Ryan M.D.   Attending:  ARACELIS FITZGERALD/ Dr. Pina    Patient ID:    Name:  Luis Alfredo Sands     YOB: 1985  Age:  39 y.o.  male   MRN:  [id number]    Hospital Course (carried forward and updated):  Luis Alfredo Sands is a 39 y.o. male with a PMH of polysubstance use disorder, admitted 06/10/2024 for cardiac arrest.    Consultants:  Critical Care  Cardiology     Interval Events:    06/11 - Hypertensive overnight, has not required pressor therapy. Intermittent episodes of myoclonus, EEG without evidence of seizures. Off sedation, no corneal or gag reflex, responsive to pain in upper extremities only.     06/12 - Febrile overnight, shivering, normothermia protocol initiated. No corneal, cough, or gag reflex. Now unresponsive to pain in all extremities. Fixed pupils. Now off propofol.    06/13 - Neurologic status remains unchanged. Labile blood pressures overnight. Discontinuing sodium bicarbonate gtt. Plan for MRI brain and palliative care consultation.      Vitals Range last 24h:  Temp:  [36.4 °C (97.5 °F)-37.3 °C (99.1 °F)] 37.3 °C (99.1 °F)  Pulse:  [] 91  Resp:  [26-33] 26  BP: (120-165)/(77-82) 120/78  SpO2:  [99 %-100 %] 100 %      Intake/Output Summary (Last 24 hours) at 6/13/2024 1025  Last data filed at 6/13/2024 0800  Gross per 24 hour   Intake 4938.19 ml   Output 185 ml   Net 4753.19 ml        Review of Systems   Reason unable to perform ROS: Intubated.       PHYSICAL EXAM:  Vitals:    06/13/24 0700 06/13/24 0800 06/13/24 0841 06/13/24 0900   BP:  125/77 (!) 165/82 120/78   Pulse: 91 98  91   Resp: (!) 26 (!) 26  (!) 26   Temp:  37.1 °C (98.8 °F)  37.3 °C (99.1 °F)   TempSrc:  Bladder  Bladder   SpO2: 100% 100%  100%   Weight:       Height:        Body mass index is 26.69 kg/m².    O2 therapy: Pulse Oximetry: 100 %, O2 Delivery Device: Ventilator    Date 06/13/24 0700 - 06/14/24 0659   Shift 9745-8561 7829-3243  3303-8444 24 Hour Total   INTAKE   P.O. 0   0     P.O. 0   0   I.V. 22.6   22.6     Fentanyl Volume 5   5     Propofol Volume 17.5   17.5   Other         Balloon Inflated (mL) (Rectal Tube) 1 x   1 x   NG/   120     Intake (mL) (Enteral Tube 06/10/24 Nasogastric 16 Fr. Right nare) 120   120   IV Piggyback 97.1   97.1     Volume (mL) (calcium CHLORIDE 10 % 1,000 mg in dextrose 5% 100 mL IVPB) 97.1   97.1   Shift Total 239.7   239.7   OUTPUT   Urine 15   15     Output (mL) (Urethral Catheter) 15   15   Shift Total 15   15   .7   224.7          Physical Exam  Constitutional:       Appearance: He is normal weight. He is ill-appearing.      Interventions: He is intubated.   HENT:      Head: Normocephalic and atraumatic.   Eyes:      General: No scleral icterus.     Conjunctiva/sclera: Conjunctivae normal.   Cardiovascular:      Rate and Rhythm: Regular rhythm. Tachycardia present.      Heart sounds: No murmur heard.  Pulmonary:      Effort: He is intubated.      Breath sounds: Rhonchi present. No wheezing.   Abdominal:      General: Abdomen is flat. Bowel sounds are normal.      Palpations: Abdomen is soft.   Musculoskeletal:      Right lower leg: No edema.      Left lower leg: No edema.   Skin:     General: Skin is warm and dry.   Neurological:      Mental Status: He is unresponsive.      Comments: Unresponsive to painful stimuli in all extremities, absent corneal/cough/gag reflex, does not follow commands.         Recent Labs     06/12/24  0416 06/13/24  0447 06/13/24  0642   ISTATAPH 7.340* 7.624* 7.659*   ISTATAPCO2 46.6* 29.6 28.4   ISTATAPO2 97* 100* 112*   ISTATATCO2 27 32 33   RAWEZWE9BQR 97 99 99   ISTATARTHCO3 25.1* 30.8* 32.0*   ISTATARTBE -1 10* 11*   ISTATTEMP 36.6 C 37.1 C 36.7 C   ISTATFIO2 30 30 30   ISTATSPEC Arterial Arterial Arterial   ISTATAPHTC 7.346* 7.623* 7.664*   UBYCFUTF6YV 95* 100* 111*     Recent Labs     06/11/24  0428 06/11/24  1215 06/12/24  0510 06/12/24  1030 06/12/24  1706  06/12/24  2304 06/13/24  0506   SODIUM 135   < > 136   < > 132* 136 132*   POTASSIUM 4.2   < > 4.9   < > 4.6 3.9 3.6   CHLORIDE 102   < > 95*   < > 90* 90* 86*   CO2 15*   < > 23   < > 24 26 28   BUN 37*   < > 66*   < > 79* 77* 89*   CREATININE 3.06*   < > 4.36*   < > 5.57* 5.95* 6.27*   MAGNESIUM 2.0   < > 3.5*   < > 3.6* 3.6* 3.5*   PHOSPHORUS 4.0  --  7.3*  --   --   --  4.3   CALCIUM 7.8*   < > 7.3*   < > 7.9* 7.1* 6.8*    < > = values in this interval not displayed.     Recent Labs     06/11/24  0337 06/11/24 0428 06/11/24  2319 06/12/24  0510 06/12/24  1030 06/12/24  1706 06/12/24  2304 06/13/24  0506   ALTSGPT  --    < > 4343* 3745*  --   --   --  2282*   ASTSGOT  --    < > 3627* 2520*  --   --   --  900*   ALKPHOSPHAT  --    < > 120* 120*  --   --   --  114*   TBILIRUBIN  --    < > 1.3 1.4  --   --   --  1.4   DBILIRUBIN  --   --  0.9*  --   --   --   --   --    PREALBUMIN 12.7*  --   --   --   --   --   --   --    GLUCOSE  --    < > 105* 102*   < > 119* 131* 125*    < > = values in this interval not displayed.     Recent Labs     06/11/24  0428 06/11/24  0604 06/12/24  0510 06/13/24  0506   RBC 5.33  --  4.70 4.32*   HEMOGLOBIN 14.4  --  12.8* 11.9*   HEMATOCRIT 42.7  --  36.8* 32.2*   PLATELETCT 206  --  185 151*   PROTHROMBTM  --  19.7*  --   --    APTT  --  31.1  --   --    INR  --  1.65*  --   --      Recent Labs     06/11/24  0428 06/11/24  2319 06/12/24  0510 06/13/24  0506   WBC 18.5*  --  15.9* 11.0*   NEUTSPOLYS 89.80*  --  86.20* 79.90*   LYMPHOCYTES 3.40*  --  5.20* 11.10*   MONOCYTES 5.10  --  8.60 7.10   EOSINOPHILS 0.00  --  0.00 0.80   BASOPHILS 0.00  --  0.00 0.20   ASTSGOT >7000* 3627* 2520* 900*   ALTSGPT 6503* 4343* 3745* 2282*   ALKPHOSPHAT 117* 120* 120* 114*   TBILIRUBIN 1.4 1.3 1.4 1.4       Meds:   acetaminophen  650 mg      ampicillin-sulbactam (UNASYN) IV  3 g Stopped (06/13/24 0544)    artificial tears  1 Application      propofol  0-80 mcg/kg/min (Ideal) Stopped (06/13/24 2374)     hydrALAZINE  20 mg      labetalol  10-20 mg      heparin  5,000 Units      carboxymethylcellulose  1 Drop      busPIRone  15 mg      fentaNYL  100 mcg      fentaNYL   mcg/hr Stopped (06/13/24 0719)    Respiratory Therapy Consult        famotidine  20 mg      senna-docusate  2 Tablet      And    polyethylene glycol/lytes  1 Packet      And    magnesium hydroxide  30 mL      And    bisacodyl  10 mg      lidocaine  2 mL      Pharmacy  1 Each      HYDROmorphone  0.5-1 mg      midazolam  1-5 mg      ipratropium-albuterol  3 mL      thiamine  250 mg 250 mg (06/13/24 0521)        Procedures:  06/10 - Intubation, arterial line, central line, bronchoscopy with BAL    Imaging:  DX-CHEST-PORTABLE (1 VIEW)   Final Result         1.  No acute cardiopulmonary disease.      DX-CHEST-PORTABLE (1 VIEW)   Final Result         1.  No acute cardiopulmonary disease.   2.  Endotracheal tube terminates at T3, could be advanced 1 to 2 cm.      DX-CHEST-PORTABLE (1 VIEW)   Final Result         1.  No acute cardiopulmonary disease.      EC-ECHOCARDIOGRAM COMPLETE W/ CONT   Final Result      DX-CHEST-PORTABLE (1 VIEW)   Final Result      1.  Interval placement of left internal jugular central venous catheter.   2.  Interval placement of enteric sump tube.   3.  No evidence of procedure-related pneumothorax.      DX-ABDOMEN FOR TUBE PLACEMENT   Final Result      Enteric tube tip projects over the upper stomach      CT-CTA CHEST PULMONARY ARTERY W/ RECONS   Final Result      1.  No central or segmental pulmonary embolus   2.  BILATERAL dependent airspace disease airway fluid suspicious for aspiration pneumonia   3.  Hepatic steatosis   4.  Trace LEFT anterior chest wall gas of uncertain etiology            CT-HEAD W/O   Final Result      No acute intracranial abnormality.            DX-CHEST-PORTABLE (1 VIEW)   Final Result         1.  No acute cardiopulmonary disease.      MR-BRAIN-W/O    (Results Pending)       ASSESSEMENT and  PLAN:    * Cardiac arrest (HCC)- (present on admission)  Assessment & Plan  Likely secondary to overdose in the setting of polysubstance use disorder. ECHO showed EF 10-15%, severely reduced LV systolic function.  -Normothermia protocol    Acute encephalopathy  Assessment & Plan  In the setting of cardiac arrest. EEG with findings of severe diffuse cerebral function.  -F/u NSE  -MRI brain 72 hours post-cardiac arrest  -Thiamine    Acute respiratory failure with hypoxia (HCC)  Assessment & Plan  Secondary to aspiration pneumonia in the setting of cardiac arrest. CTA thorax with findings of bilateral dependent airspace disease with fluid in airways.   Intubated 06/10.  -Ampicillin/sulbactam    Lactic acidosis  Assessment & Plan  Secondary to cardiac arrest.  Previously on sodium bicarbonate gtt.    Transaminitis- (present on admission)  Assessment & Plan  Secondary to ischemic hepatitis in the setting of cardiac arrest. Hepatitis panel negative.  -Avoid hepatotoxins    Acute kidney injury (HCC)  Assessment & Plan  Secondary to ATN in the setting of cardiac arrest. Cr 2.35 on admission -> 6.27.  -Monitor  -Avoid nephrotoxins, renally dose medications    Polysubstance use disorder  Assessment & Plan  UDS positive for amphetamines, cannabinoids, fentanyl.         DISPO: Remains inpatient ICU    CODE STATUS: FULL CODE    Quality Measures:  Feeding: Tube feeds  Analgesia: Fentanyl  Sedation: Propofol  Thromboprophylaxis: None  Head of bed: >30 degrees  Ulcer prophylaxis: Famotidine  Glycemic control: Correctional: Insulin regular / Basal: None  Bowel care: Bowel regimen  Indwelling lines: Central line, arterial line  Deescalation of antibiotics: Ampicillin/sulbactam      Juju Ryan M.D.

## 2024-06-13 NOTE — CARE PLAN
Problem: Ventilation  Goal: Ability to achieve and maintain unassisted ventilation or tolerate decreased levels of ventilator support  Description: Target End Date:  4 days     Document on Vent flowsheet    1.  Support and monitor invasive and noninvasive mechanical ventilation  2.  Monitor ventilator weaning response  3.  Perform ventilator associated pneumonia prevention interventions  4.  Manage ventilation therapy by monitoring diagnostic test results  Outcome: Progressing     Ventilator Daily Summary    Vent Day # 4  Airway: 8.0 @ 24    Ventilator settings: 26/570/8/30  Weaning trials:   Respiratory Procedures:     Plan: Continue current ventilator settings and wean mechanical ventilation as tolerated per physician orders.

## 2024-06-13 NOTE — CARE PLAN
The patient is Watcher - Medium risk of patient condition declining or worsening    Shift Goals  Clinical Goals: stable hemodynamics, TTM, manage shivering  Patient Goals: RAGINI  Family Goals: Updates    Problem: Neuro Status  Goal: Neuro status will remain stable or improve  Outcome: Not Progressing  Note: Neuro exam q4h; pt remained GCS 3 with absent corneal/cough/gag reflexes.      Problem: Psychosocial  Goal: Patient and family will demonstrate ability to cope with life altering diagnosis and/or procedure  Outcome: Progressing  Note: Discussed plan of care with family and answered questions about next steps.      Problem: Hemodynamics  Goal: Patient's hemodynamics, fluid balance and neurologic status will be stable or improve  Outcome: Progressing  Note: Pt remained hemodynamically stable this shift without the need for vasopressors. Administered labetalol x1 dose for SBP > 160.      Problem: Pain - Standard  Goal: Alleviation of pain or a reduction in pain to the patient’s comfort goal  Outcome: Progressing  Note: Assessed pain q2h using CPOT; no indication of pain this shift.     Progress made toward(s) clinical / shift goals:  See above.

## 2024-06-13 NOTE — PROGRESS NOTES
Notified MD of new hypotension--SBP 83 with MAP in 50s. No change to plan of care at this time. Also notified of critical lab values: ALT 2282, CPK 85930, and Ca 6.8.

## 2024-06-13 NOTE — PROGRESS NOTES
(Late entry)  1320  - Plan discussed with MD Pina for sedation vacation. Yovani notified that patient continues to be shivering, BSAS 2 while on propofol drip. Per MD Pina, do not proceed with SAT, okay to titrate up propofol for shivering, do not start fentanyl drip.     Propofol titrated to 40 mcg/kg/min with improvement in shivering noted

## 2024-06-13 NOTE — CARE PLAN
The patient is Watcher - Medium risk of patient condition declining or worsening    Shift Goals  Clinical Goals: stable BP and HR, wean prop and fent, no shivering  Patient Goals: na  Family Goals: update on patient condition    Progress made toward(s) clinical / shift goals:  BP stable, prop restarted d/t myoclonus, mild shivering intermittently throughout day      Problem: Pain - Standard  Goal: Alleviation of pain or a reduction in pain to the patient’s comfort goal  Outcome: Progressing     Problem: Skin Integrity  Goal: Skin integrity is maintained or improved  Outcome: Progressing     Problem: Respiratory  Goal: Patient will achieve/maintain optimum respiratory ventilation and gas exchange  Outcome: Progressing     Problem: Hemodynamics  Goal: Patient's hemodynamics, fluid balance and neurologic status will be stable or improve  Outcome: Progressing     Problem: Knowledge Deficit - Standard  Goal: Patient and family/care givers will demonstrate understanding of plan of care, disease process/condition, diagnostic tests and medications  Outcome: Progressing       Patient is not progressing towards the following goals:

## 2024-06-13 NOTE — PROGRESS NOTES
Dr. Pina at bedside and updated on continued low urine output post lasix administration. No changes at this time.

## 2024-06-13 NOTE — CONSULTS
MRN: [id number]  Date of palliative consult: 2024  Reason for consult: ACP/GOC  Referring provider: Dr. Pina  Location of consult: [facility]  Additional consulting services: cardiology, critical care    HPI:   Luis Alfredo Mora is a 39 y.o. male admitted 6/10/2024 with OOH cardiac arrest. Patient was found unresponsive with agonal respirations and CPR was initiated by bystander (patient's brother). EMS found patient in asystole and obtained ROSC after 2 rounds of CPR with epinephrine but subsequently lost pulses again and patient arrived to ED with CPR in progress.  UDS positive for amphetamines, cannabinoids, and fentanyl. Echocardiogram revealed EF of 10-15%.  Bronchoscopy performed 6/10/2024 due to presumed aspiration.  Patient remains intubated in the ICU.    Medication Allergy/Sensitivities:  Not on File    ROS:    Review of Systems   Reason unable to perform ROS: acuity of condition.       PE:   Recent vital signs  BMI: Body mass index is 26.69 kg/m².    Temp (24hrs), Av.8 °C (98.3 °F), Min:36.4 °C (97.5 °F), Max:37.1 °C (98.8 °F)  Temperature: 37.1 °C (98.8 °F), Monitored Temp: 37.1 °C (98.8 °F)  Pulse  Av  Min: 61  Max: 136   Blood Pressure: (!) 165/82, Arterial BP: 158/74       Physical Exam  Constitutional:       Appearance: He is ill-appearing.      Interventions: He is intubated.   Pulmonary:      Effort: He is intubated.       Recent Labs     24  1706 24  2304 24  0506   SODIUM 132* 136 132*   POTASSIUM 4.6 3.9 3.6   CHLORIDE 90* 90* 86*   CO2 24 26 28   GLUCOSE 119* 131* 125*   BUN 79* 77* 89*   CREATININE 5.57* 5.95* 6.27*   CALCIUM 7.9* 7.1* 6.8*     Recent Labs     24  0428 24  0510 24  0506   WBC 18.5* 15.9* 11.0*   RBC 5.33 4.70 4.32*   HEMOGLOBIN 14.4 12.8* 11.9*   HEMATOCRIT 42.7 36.8* 32.2*   MCV 80.1* 78.3* 74.5*   MCH 27.0 27.2 27.5   MCHC 33.7 34.8 37.0*   RDW 43.2 43.0 39.7   PLATELETCT 206 185 151*   MPV 10.6 10.9 10.8       ASSESSMENT/PLAN WITH  SHARED DECISION MAKING:   Medications reviewed. Labs Reviewed.     Pertinent imaging reviewed.    PHYSICAL ASPECTS OF CARE  Palliative Performance Scale: 10%    # Asystolic cardiac arrest  # Acute respiratory failure with hypoxia on ventilator support  # Shock  # Polysubstance abuse  # Acute encephalopathy  # Lactic acidosis  # Elevated LFTs with suspected ischemic liver injury  # GAVIN, likely ATN from cardiac arrest    SOCIAL ASPECTS OF CARE  Patient is not  and has no children.  He has 2 siblings, a brother and a sister.  His parents are . His mom, Edie, lives in [place].  His dad, Nagi Loja, lives in [place].  Patient moved here 2 weeks ago to live with his brother to look for a job.  He does have a history of drug abuse and has been to rehab.  However, family states they are certain that he has never done fentanyl intentionally.  Patient loves basketball.    SPIRITUAL ASPECTS OF CARE   Patient is Religion.  Family feels that he would appreciate a visit from a .  His mother is Sabianism and requests a visit from . Orders placed.    GOALS OF CARE/SERIOUS ILLNESS CONVERSATION  0945: Consult received and EMR reviewed. Case discussed with MD and ICU RN, updates appreciated. Met with patient's mother, Edie, at bedside. She is not open to GOC discussion at this time and requests that PC APRN return this afternoon.     1430: PC APRN returned to bedside x 3 today. Mother not present at bedside. Phone call placed to patient's mother who states that she will return to bedside around 1530. PC APRN to return.     1530:  PC APRN returned to bedside.  Patient's mother, Edie, and pt's sister at bedside.  Patient's brother joined part way through conversation.  Introduced self and role of palliative care.  Family agreeable to goals of care discussion at this time.    Assessed family's understanding of current clinical picture.  They demonstrated good understanding and states that doctors have  been updating them daily.  Discussed concern for anoxic brain injury in the setting of cardiac arrest and pending brain MRI.  Discussed that even should brain MRI be unrevealing, clinical exam still concerning for anoxia. Family verbalized understanding.     Inquired if patient has ever completed an advance directive.  Family does not believe that he has.  Discussed next of kin order.  As patient is not  and has no children, parents would be default healthcare surrogate.  They are no longer  but are willing to work together on medical decision making and would like patient siblings involved whenever possible as well.    Discussed the issue of CODE STATUS, especially given patient's significantly reduced EF of 10 to 15%.  Recommendation made to consider DNAR status given this and patient's concerning neurological exam and poor prognosis.  Family requested that this be discussed with patient's father, Nagi Mills., also.      Patient sister called Nagi Loja  and placed him on speaker phone.  He also demonstrates a good understanding of current clinical picture and would like to participate in medical decision-making.  Discussed the issue of code status with Nagi Mills. as well. Family requested time to discuss this further amongst themselves. PC APRN reassured family that they are able to take time to discuss and that PC remains available to discuss further should questions arise.     Also discussed kidney injury and transaminitis. Encouraged family to consider whether Luis Angel would be accepting of an additional form of life support, such as HD, should this be requried.     Family asked about compassionate extubation.  Brief review of comfort focused care should MRI reveal severe anoxic injury or should family feel that burden of continued medical treatment is outweighing benefits.  Family asked several questions regarding this and all family is in agreement that they do not want patient to suffer.     Active  listening, reflection, reminiscing, validation & normalization, and empathic support utilized throughout this encounter.  All questions answered and contact information provided, encouraged to call with any questions or concerns.     Code Status: Full     ACP Documents: None    25 minutes spent discussing advance care planning, this time excludes any other billed services.    Interval diagnostic studies and medical documentation entries pertinent to this case were reviewed independently by me. This patient has at least one acute or chronic illness or injury that poses a threat to life or bodily function. This patient suffers from a high risk of morbidity from additional invasive diagnostic testing or intensive treatment. Discussion of recommendations and coordination of care undertaken with primary provider/treatment team.      ALVAREZ Sutton  Palliative Care Nurse Practitioner   [phone]

## 2024-06-13 NOTE — DIETARY
"Nutrition support weekly update:  Day 3 of admit.  Luis Alfredo Mora is a 39 y.o. male with admitting DX of Cardiac arrest (HCC) [I46.9]    Current TF is Jevity 1.5, goal rate 60 ml/hr, providing 2160 kcals, 91 grams protein, 1094 mL free water, 30 g fiber.     Current propofol rate is 40 mcg/kg/min (16.4 mL/hr) which provides 433 kcal in 24 hrs. Current total daily kcals from TF+propofol is 2593 kcal/day.    Nutrition calculations per RD note 24:  Calculation/Equation: REE per MSJ (1585 kcal/day) x 1.2-1.4 = 1902 - 2218 kcal/day  PSU (24-hour T-max 37.8C, VE = 18.1L/min) = 2182 kcal/day   Total Calories / day: 1943 - 2151  (Calories / k - 31)  Total Grams Protein / day: 70 - 83  (Grams Protein / k - 1.2)    Protein needs adjusted for declining renal function:  Total Grams Protein / day: 56 - 70  (Grams Protein / k.8 - 1.0)    Due to declining renal function and high kcal provision from propofol, indicated to adjust TF to provider lower protein and fewer kcals/day.    No vasopressors on MAR. Last BM via BMS, last BM     Per MD note today: \"Plan for MRI brain and palliative care consultation.\"    Recommendations/Plan:  Change TF for on propofol goal to Jevity 1.5 at 45 mL/hr, which provides 1620 kcal (+propofol kcal), 69 g protein, and 820 mL free water in 24 hrs. Updated RN.  Additional fluids per MD.  RD monitoring propofol infusion, will make further adjustments to nutrition support regimen as indicated.    RD continues to follow.             "

## 2024-06-13 NOTE — DOCUMENTATION QUERY
"                                                                         On license of [facility]                                                                       Query Response Note      PATIENT:               AMGNO AQUINO  ACCT #:                  [id number]  MRN:                     [id number]  :                      1985  ADMIT DATE:       6/10/2024 6:48 AM  DISCH DATE:          RESPONDING  PROVIDER #:        [id number]           QUERY TEXT:    Encephalopathy is documented in the Medical Record. Please specify type.    The patient's Clinical Indicators include:  40yo with dx of cardiac arrest, pneumonitis, acute respiratory failure with hypoxia, acute and subacute hepatic failure, ATN     PN \"Acute encephalopathy- suspicion for anoxic encephalopathy due to out of hospital cardiac arrest with unknown downtime\"   PN \"Toxic encephalopathy due to drug ingestion remains in differential\"    Risk factors: drug ingestion, cardiac arrest, acute respiratory failure with hypoxia, ATN, aspiration pneumonia  Treatments: EEG, labwork, imaging, monitoring, mechanical intubation, medication    Contact me with questions.     Thank you,  Abena Temple, OMARP, CDI  [email or web address]  Options provided:   -- Toxic/metabolic encephalopathy   -- Due to medications or drugs   -- Metabolic encephalopathy   -- Toxic encephalopathy   -- Other explanation, (please specify other explanation)   -- Unable to determine      Query created by: Abena Temple on 2024 4:26 PM    RESPONSE TEXT:    Toxic/metabolic encephalopathy          Electronically signed by:  KELSI GAN MD 2024 6:16 AM              "

## 2024-06-13 NOTE — ASSESSMENT & PLAN NOTE
6/12 - pt's mother and sister came from [place].  I gave them a detailed update of the patient's condition, prolonged downtime, concerns for severe non reversible brain injury due to hypoxia, and poor prognosis.  I explained that his exam was showing signs of brain death with lack of corneal/cough/gag reflexes and no w/d with painful stimuli.  Family demonstrated understanding and very emotional.  6/13 - pt's neuro exam unchanged.  I broached the subject of making the patient a DNR.  I consulted Palliative to assist with care.  6/14 - family conference with palliative care-->pt now DNAR and family would like to proceed with comfort care.  Pt is a registered donor and will keep patient as comfortable as possible during their evaluation

## 2024-06-13 NOTE — CARE PLAN
Problem: Ventilation  Goal: Ability to achieve and maintain unassisted ventilation or tolerate decreased levels of ventilator support  Description: Target End Date:  4 days     Document on Vent flowsheet    1.  Support and monitor invasive and noninvasive mechanical ventilation  2.  Monitor ventilator weaning response  3.  Perform ventilator associated pneumonia prevention interventions  4.  Manage ventilation therapy by monitoring diagnostic test results  Outcome: Not Met     Ventilator Daily Summary    Vent Day # 4  Airway: 8.0 @ 24    Ventilator settings: 25/570/+8,28%  Weaning trials: None    Plan: Continue current ventilator settings and wean mechanical ventilation as tolerated per physician orders.

## 2024-06-13 NOTE — PROGRESS NOTES
4 Eyes Skin Assessment Completed by OMAYRA Vega and OMAYRA Sousa.    Head WDL  Ears WDL  Nose WDL  Mouth WDL  Neck WDL  Breast/Chest WDL  Shoulder Blades Redness and Blanching  Spine Redness and Blanching, abrasion  (R) Arm/Elbow/Hand Swelling  (L) Arm/Elbow/Hand Swelling  Abdomen WDL  Groin WDL  Scrotum/Coccyx/Buttocks Redness and Blanching  (R) Leg WDL  (L) Leg WDL  (R) Heel/Foot/Toe Redness and Blanching, calloused  (L) Heel/Foot/Toe Redness and Blanching, calloused    Devices In Places ECG, Blood Pressure Cuff, Pulse Ox, Hood, Arterial Line, ET Tube, OG/NG, and Central Line    Interventions In Place Heel Mepilex, Sacral Mepilex, Pillows, Elbow Mepilex, Q2 Turns, and ZFlo Pillow    Possible Skin Injury No    Pictures Uploaded Into Epic N/A  Wound Consult Placed N/A  RN Wound Prevention Protocol Ordered No

## 2024-06-13 NOTE — PROGRESS NOTES
Cardiology Follow Up Progress Note    Date of Service  6/13/2024    Attending Physician  Viri Pina M.D.    Chief Complaint   Cardiac arrest    HPI  Luis Alfredo Sands is a 39 y.o. male admitted 6/10/2024 with cardiac arrest likely secondary to hypoxia from acute drug intoxication    Interim Events  Patient unable to provide history due to ongoing respiratory failure requiring ongoing mechanical support.  There were no acute events per nursing staff.  Telemetry monitor demonstrated sinus tachycardia with intermittent atrial fibrillation with aberrancy.   Review of Systems  Review of Systems    Vital signs in last 24 hours  Temp:  [36.4 °C (97.5 °F)-37.3 °C (99.1 °F)] 37.3 °C (99.1 °F)  Pulse:  [] 91  Resp:  [26-33] 26  BP: (120-165)/(77-82) 120/78  SpO2:  [99 %-100 %] 100 %    Physical Exam  Physical Exam  Constitutional:       Appearance: Normal appearance. He is ill-appearing.   HENT:      Head: Normocephalic and atraumatic.      Mouth/Throat:      Mouth: Mucous membranes are moist.      Pharynx: Oropharynx is clear.   Eyes:      Extraocular Movements: Extraocular movements intact.      Conjunctiva/sclera: Conjunctivae normal.   Cardiovascular:      Rate and Rhythm: Normal rate and regular rhythm.      Pulses: Normal pulses.      Heart sounds: Normal heart sounds. No murmur heard.     No friction rub. No gallop.   Pulmonary:      Effort: Pulmonary effort is normal.      Breath sounds: Rhonchi present. No wheezing or rales.      Comments: Intubated with endotracheal tube taped at the lips  Abdominal:      General: Bowel sounds are normal. There is no distension.      Palpations: Abdomen is soft.   Musculoskeletal:         General: Normal range of motion.      Cervical back: Normal range of motion and neck supple.      Right lower leg: No edema.      Left lower leg: No edema.   Skin:     General: Skin is warm and dry.   Neurological:      General: No focal deficit present.      Mental Status: Mental status  "is at baseline.   Psychiatric:         Mood and Affect: Mood normal.         Behavior: Behavior normal.         Thought Content: Thought content normal.         Judgment: Judgment normal.         Lab Review  Lab Results   Component Value Date/Time    WBC 11.0 (H) 06/13/2024 05:06 AM    RBC 4.32 (L) 06/13/2024 05:06 AM    HEMOGLOBIN 11.9 (L) 06/13/2024 05:06 AM    HEMATOCRIT 32.2 (L) 06/13/2024 05:06 AM    MCV 74.5 (L) 06/13/2024 05:06 AM    MCH 27.5 06/13/2024 05:06 AM    MCHC 37.0 (H) 06/13/2024 05:06 AM    MPV 10.8 06/13/2024 05:06 AM      Lab Results   Component Value Date/Time    SODIUM 132 (L) 06/13/2024 05:06 AM    POTASSIUM 3.6 06/13/2024 05:06 AM    CHLORIDE 86 (L) 06/13/2024 05:06 AM    CO2 28 06/13/2024 05:06 AM    GLUCOSE 125 (H) 06/13/2024 05:06 AM    BUN 89 (H) 06/13/2024 05:06 AM    CREATININE 6.27 (HH) 06/13/2024 05:06 AM      Lab Results   Component Value Date/Time    ASTSGOT 900 (H) 06/13/2024 05:06 AM    ALTSGPT 2282 (HH) 06/13/2024 05:06 AM     Lab Results   Component Value Date/Time    TRIGLYCERIDE 66 06/10/2024 07:29 AM    TROPONINT 89 (H) 06/10/2024 07:29 AM       No results for input(s): \"NTPROBNP\" in the last 72 hours.    Cardiac Imaging and Procedures Review  Echocardiogram: Severe LV systolic dysfunction with estimate ejection fraction of 10 to 15%, severe reduction in RV systolic function with elevated right atrial pressure estimated at 15 mmHg      Assessment/Plan  1.  Cardiac arrest status post CPR bystander as well as EMS CPR with return of spontaneous circulation  2.  Marijuana use  3.  Alcohol use  4.  Amphetamine and fentanyl toxicity    Clinically, he is doing very poorly and likely suffered a drug overdose leading to progressive hypoxia with subsequent hypoxic cardiac arrest.  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  At this time patient appears to still be in multisystem organ failure and requiring ongoing mechanical ventilation but by laboratory evaluation has seen some improvement..  An extensive discussion was had with Dr. Alvares of the critical care service regarding overall prognosis as well as significant bedside discussion with the family regarding his guarded prognosis.  He is not able to be instituted on goal-directed medical therapy for his likely nonischemic cardiomyopathy at this time given ongoing pressor support.  We will continue to follow closely and institute appropriate medical therapy if clinically indicated.  Family at bedside consisted of his brother, his mother and his sister.        Critical care time: 32 minutes was utilized in direct face-to-face patient contact, discussion with family and procuring information from family, chart review, chart preparation, laboratory review, imaging review, EKG review and discussion and a multidisciplinary team approach          Thank you for allowing me to participate in the care of this patient.  I will continue to follow this patient    Please contact me with any questions.    Clinton Lion D.O.   Cardiologist, [facility]  [phone]

## 2024-06-13 NOTE — PROGRESS NOTES
(Late entry)    0719 - Sedation drips fentanyl and propofol turned off per MD Pina request in order to evaluate neuro status.     0940 - MD Pina notified of Shivering noted to patients arms, BSAS of 2. Order received to restart propofol at 30 mcg/kg/min, and titrate to stop shivering

## 2024-06-13 NOTE — CARE PLAN
The patient is Watcher - Medium risk of patient condition declining or worsening    Shift Goals  Clinical Goals: Hemodynamic stability, TTM, MRI  Patient Goals: RAGINI  Family Goals: updates    Progress made toward(s) clinical / shift goals:    Problem: Hemodynamics  Goal: Patient's hemodynamics, fluid balance and neurologic status will be stable or improve  Outcome: Progressing  Note: Hemodynamics stable with no need for vasopressors, hypertension managed with ordered medications      Problem: Mechanical Ventilation  Goal: Safe management of artificial airway and ventilation  Outcome: Progressing     Problem: Pain - Standard  Goal: Alleviation of pain or a reduction in pain to the patient’s comfort goal  Outcome: Progressing       Patient is not progressing towards the following goals:      Problem: Urinary Elimination  Goal: Establish and maintain regular urinary output  Outcome: Not Progressing  Note: Urinary output continues to decrease, <10mL/hr. MD Pina notified

## 2024-06-14 NOTE — CARE PLAN
Problem: Ventilation  Goal: Ability to achieve and maintain unassisted ventilation or tolerate decreased levels of ventilator support  Description: Target End Date:  4 days     Document on Vent flowsheet    1.  Support and monitor invasive and noninvasive mechanical ventilation  2.  Monitor ventilator weaning response  3.  Perform ventilator associated pneumonia prevention interventions  4.  Manage ventilation therapy by monitoring diagnostic test results  Outcome: Not Met     Ventilator Daily Summary    Vent Day # 5  Airway: 8.0 @ 24    Ventilator settings: 25/400/+8, 30%    Weaning trials: No SBT per MD    Plan: Continue current ventilator settings and wean mechanical ventilation as tolerated per physician orders.

## 2024-06-14 NOTE — PROGRESS NOTES
4 Eyes Skin Assessment Completed by OMYARA Forde and OMAYRA Marquez.    Head WDL  Ears WDL  Nose WDL  Mouth dry lips  Neck WDL  Breast/Chest WDL  Shoulder Blades WDL  Spine abrasion center of back, see photo    (R) Arm/Elbow/Hand WDL  (L) Arm/Elbow/Hand WDL  Abdomen WDL  Groin WDL  Scrotum/Coccyx/Buttocks Redness and Blanching  (R) Leg Swelling, dry  (L) Leg Swelling, dry  (R) Heel/Foot/Toe Redness and Blanching, calloused  (L) Heel/Foot/Toe Redness and Blanching, dry          Devices In Places ECG, Blood Pressure Cuff, Pulse Ox, Hood, Arterial Line, SCD's, ET Tube, OG/NG, and Central Line      Interventions In Place Heel Float Boots, TAP System, Pillows, Q2 Turns, Low Air Loss Mattress, Barrier Cream, and Heels Loaded W/Pillows    Possible Skin Injury Yes    Pictures Uploaded Into Epic Yes  Wound Consult Placed Yes  RN Wound Prevention Protocol Ordered Yes

## 2024-06-14 NOTE — PROGRESS NOTES
Pulmonary/Critical Care Medicine   Progress Note    Date of service: 6/14/2024  Time: 11:30    Met with the patient's family with palliative and gave them the unfortunate news and findings on MRI of the brain that were consistent with anoxic brain injury.  I explained that this is a non reversible injury and would be in a persistent vegetative state if he did not proceed to brain death and likely would not be able to be liberated safely from the ventilator.      The family decided to proceed with transition to comfort care.  The are requesting spiritual consultation.  Palliative is assisting with this.  We will await their decision as to when to proceed.    Viri Pina MD  Pulmonary and Critical Care Medicine

## 2024-06-14 NOTE — RESPIRATORY CARE
S.T.O.P for Intrahospital Transportation Safety for Ventilated Patients     S - Borges then maneuver.  Were airway secretions cleared? yes    T - Check your tube. Is your airway patent and in correct position? yes    O - Transition from oxygen cylinder to wall oxygen source on return.  Was this accomplished?  yes    P - Transition from ventilator battery power to wall power source on return.  Was this accomplished?  yes

## 2024-06-14 NOTE — PROGRESS NOTES
[facility] ICU Progress Note      Admit Date: 6/10/2024    Resident(s): Juju Ryan M.D.   Attending:  ARACELIS FITZGERALD/ Dr. Pina    Patient ID:    Name:  Luis Alfredo Sands     YOB: 1985  Age:  39 y.o.  male   MRN:  [id number]    Hospital Course (carried forward and updated):  Luis Alfredo Sands is a 39 y.o. male with a PMH of polysubstance use disorder, admitted 06/10/2024 for cardiac arrest.    Consultants:  Critical Care  Cardiology     Interval Events:    06/11 - Hypertensive overnight, has not required pressor therapy. Intermittent episodes of myoclonus, EEG without evidence of seizures. Off sedation, no corneal or gag reflex, responsive to pain in upper extremities only.     06/12 - Febrile overnight, shivering, normothermia protocol initiated. No corneal, cough, or gag reflex. Now unresponsive to pain in all extremities. Fixed pupils. Now off propofol.    06/13 - Neurologic status remains unchanged. Labile blood pressures overnight. Discontinuing sodium bicarbonate gtt. Plan for MRI brain and palliative care consultation.    06/14 - Neurologic exam remains unchanged with absent corneal, cough, and gag reflex, pupils fixed. MRI brain showed evidence of anoxic brain injury. Discontinuing fentanyl. Sinus rhythm to sinus tachycardia with SBP 90-120s overnight. Anuric. Goals of care discussion today.    Vitals Range last 24h:  Temp:  [36.3 °C (97.3 °F)-37.1 °C (98.8 °F)] 36.9 °C (98.4 °F)  Pulse:  [] 96  Resp:  [26-29] 28  BP: ()/(52-91) 109/59  SpO2:  [97 %-100 %] 98 %      Intake/Output Summary (Last 24 hours) at 6/14/2024 1004  Last data filed at 6/14/2024 0800  Gross per 24 hour   Intake 1532.83 ml   Output 165 ml   Net 1367.83 ml        Review of Systems   Reason unable to perform ROS: Intubated.       PHYSICAL EXAM:  Vitals:    06/14/24 0602 06/14/24 0700 06/14/24 0800 06/14/24 0900   BP:  111/58 107/57 109/59   Pulse: 90 92 94 96   Resp: (!) 26 (!) 27 (!) 26 (!) 28   Temp:  36.7 °C (98.1  °F) 36.9 °C (98.4 °F) 36.9 °C (98.4 °F)   TempSrc:  Bladder Bladder Bladder   SpO2: 98% 98% 98% 98%   Weight:       Height:        Body mass index is 26.69 kg/m².    O2 therapy: Pulse Oximetry: 98 %, O2 Delivery Device: Ventilator    Date 06/14/24 0700 - 06/15/24 0659   Shift 6768-6279 1023-6162 9576-3686 24 Hour Total   INTAKE   P.O. 0   0     P.O. 0   0   I.V. 32.9   32.9     Propofol Volume 32.9   32.9   NG/GT 90   90     Intake (mL) (Enteral Tube 06/10/24 Nasogastric 16 Fr. Right nare) 90   90   Enteral 30   30     Free Water / Tube Flush 30   30   Shift Total 152.9   152.9   OUTPUT   Urine 0   0     Indwelling Cathether 0   0     Output (mL) (Urethral Catheter) 0   0   Shift Total 0   0   .9   152.9            Physical Exam  Constitutional:       Appearance: He is normal weight. He is ill-appearing.      Interventions: He is intubated.   HENT:      Head: Normocephalic and atraumatic.   Eyes:      General: No scleral icterus.     Conjunctiva/sclera: Conjunctivae normal.   Cardiovascular:      Rate and Rhythm: Regular rhythm. Tachycardia present.      Heart sounds: No murmur heard.  Pulmonary:      Effort: He is intubated.      Breath sounds: Rhonchi present. No wheezing.   Abdominal:      General: Abdomen is flat. Bowel sounds are normal.      Palpations: Abdomen is soft.   Musculoskeletal:      Right lower leg: No edema.      Left lower leg: No edema.   Skin:     General: Skin is warm and dry.   Neurological:      Mental Status: He is unresponsive.      Comments: Unresponsive to painful stimuli in all extremities, absent corneal/cough/gag reflex, does not follow commands.         Recent Labs     06/13/24  0642 06/13/24  1331 06/13/24  1928   ISTATAPH 7.659* 7.562* 7.561*   ISTATAPCO2 28.4 37.4* 37.2*   ISTATAPO2 112* 93* 94*   ISTATATCO2 33 35* 35*   SBTIQZB8QXS 99 98 98   ISTATARTHCO3 32.0* 33.6* 33.4*   ISTATARTBE 11* 11* 10*   ISTATTEMP 36.7 C 36.8 C 36.8 C   ISTATFIO2 30 28 28   ISTATSPEC Arterial  Arterial Arterial   ISTATAPHTC 7.664* 7.565* 7.565*   ZKHFCHLO9CW 111* 92* 93*     Recent Labs     06/12/24 0510 06/12/24 1030 06/13/24 0506 06/13/24 1052 06/13/24 1742 06/13/24 2007 06/13/24 2322 06/14/24  0420   SODIUM 136   < > 132*   < > 133* 134*  --  133*   POTASSIUM 4.9   < > 3.6   < > 3.7 3.8  --  4.0   CHLORIDE 95*   < > 86*   < > 87* 86*  --  87*   CO2 23   < > 28   < > 28 29  --  28   BUN 66*   < > 89*   < > 98* 98*  --  103*   CREATININE 4.36*   < > 6.27*   < > 7.33* 7.65*  --  8.28*   MAGNESIUM 3.5*   < > 3.5*   < > 3.5*  --  3.7* 3.7*   PHOSPHORUS 7.3*  --  4.3  --   --   --   --   --    CALCIUM 7.3*   < > 6.8*   < > 7.1* 7.0*  --  7.1*    < > = values in this interval not displayed.     Recent Labs     06/11/24 2319 06/12/24 0510 06/12/24 1030 06/13/24 0506 06/13/24 1052 06/13/24 1742 06/13/24 2007 06/14/24  0420   ALTSGPT 4343* 3745*  --  2282*  --   --   --  1427*   ASTSGOT 3627* 2520*  --  900*  --   --   --  445*   ALKPHOSPHAT 120* 120*  --  114*  --   --   --  119*   TBILIRUBIN 1.3 1.4  --  1.4  --   --   --  1.2   DBILIRUBIN 0.9*  --   --   --   --   --   --   --    GLUCOSE 105* 102*   < > 125*   < > 129* 118* 102*    < > = values in this interval not displayed.     Recent Labs     06/12/24 0510 06/13/24 0506 06/14/24  0420   RBC 4.70 4.32* 3.83*   HEMOGLOBIN 12.8* 11.9* 10.2*   HEMATOCRIT 36.8* 32.2* 28.8*   PLATELETCT 185 151* 113*     Recent Labs     06/12/24  0510 06/13/24  0506 06/14/24  0420   WBC 15.9* 11.0* 10.9*   NEUTSPOLYS 86.20* 79.90* 77.70*   LYMPHOCYTES 5.20* 11.10* 10.60*   MONOCYTES 8.60 7.10 8.90   EOSINOPHILS 0.00 0.80 2.00   BASOPHILS 0.00 0.20 0.20   ASTSGOT 2520* 900* 445*   ALTSGPT 3745* 2282* 1427*   ALKPHOSPHAT 120* 114* 119*   TBILIRUBIN 1.4 1.4 1.2       Meds:   acetaminophen  650 mg      artificial tears  1 Application      propofol  0-80 mcg/kg/min (Ideal) 30 mcg/kg/min (06/14/24 0924)    hydrALAZINE  20 mg      labetalol  10-20 mg      heparin  5,000  Units      carboxymethylcellulose  1 Drop      Respiratory Therapy Consult        famotidine  20 mg      senna-docusate  2 Tablet      And    polyethylene glycol/lytes  1 Packet      And    magnesium hydroxide  30 mL      And    bisacodyl  10 mg      lidocaine  2 mL      Pharmacy  1 Each      HYDROmorphone  0.5-1 mg      midazolam  1-5 mg      ipratropium-albuterol  3 mL          Procedures:  06/10 - Intubation, arterial line, central line, bronchoscopy with BAL    Imaging:  MR-BRAIN-W/O   Final Result         The diffusion-weighted sequences demonstrates diffuse area of restricted diffusion in the supratentorial gray matter. There are also areas of restricted diffusion in the bilateral basal ganglia. These findings are consistent with diffuse anoxic brain    injury.      DX-CHEST-PORTABLE (1 VIEW)   Final Result         1.  No acute cardiopulmonary disease.      DX-CHEST-PORTABLE (1 VIEW)   Final Result         1.  No acute cardiopulmonary disease.   2.  Endotracheal tube terminates at T3, could be advanced 1 to 2 cm.      DX-CHEST-PORTABLE (1 VIEW)   Final Result         1.  No acute cardiopulmonary disease.      EC-ECHOCARDIOGRAM COMPLETE W/ CONT   Final Result      DX-CHEST-PORTABLE (1 VIEW)   Final Result      1.  Interval placement of left internal jugular central venous catheter.   2.  Interval placement of enteric sump tube.   3.  No evidence of procedure-related pneumothorax.      DX-ABDOMEN FOR TUBE PLACEMENT   Final Result      Enteric tube tip projects over the upper stomach      CT-CTA CHEST PULMONARY ARTERY W/ RECONS   Final Result      1.  No central or segmental pulmonary embolus   2.  BILATERAL dependent airspace disease airway fluid suspicious for aspiration pneumonia   3.  Hepatic steatosis   4.  Trace LEFT anterior chest wall gas of uncertain etiology            CT-HEAD W/O   Final Result      No acute intracranial abnormality.            DX-CHEST-PORTABLE (1 VIEW)   Final Result         1.  No  acute cardiopulmonary disease.          ASSESSEMENT and PLAN:    * Cardiac arrest (HCC)- (present on admission)  Assessment & Plan  Likely secondary to overdose in the setting of polysubstance use disorder. ECHO showed EF 10-15%, severely reduced LV systolic function.  -Normothermia protocol    Acute encephalopathy  Assessment & Plan  Likely anoxic brain injury in the setting of cardiac arrest. EEG with findings of severe diffuse cerebral function. Elevated .7.  MRI brain showed evidence of diffuse anoxic brain injury.  -Thiamine    Acute respiratory failure with hypoxia (HCC)  Assessment & Plan  Secondary to aspiration pneumonia in the setting of cardiac arrest. CTA thorax with findings of bilateral dependent airspace disease with fluid in airways.   Intubated 06/10.  -Ampicillin/sulbactam    Lactic acidosis  Assessment & Plan  Secondary to cardiac arrest.  Previously on sodium bicarbonate gtt.    Transaminitis- (present on admission)  Assessment & Plan  Secondary to ischemic hepatitis in the setting of cardiac arrest. Hepatitis panel negative.  -Avoid hepatotoxins    Acute kidney injury (HCC)  Assessment & Plan  Secondary to ATN in the setting of cardiac arrest. Cr 2.35 on admission -> 8.28.  -Avoid nephrotoxins, renally dose medications    Polysubstance use disorder  Assessment & Plan  UDS positive for amphetamines, cannabinoids, fentanyl.         DISPO: Remains inpatient ICU, poor prognosis    CODE STATUS: FULL CODE    Quality Measures:  Feeding: Tube feeds  Analgesia: None  Sedation: Propofol  Thromboprophylaxis: None  Head of bed: >30 degrees  Ulcer prophylaxis: Famotidine  Glycemic control: Correctional: Insulin regular / Basal: None  Bowel care: Bowel regimen  Indwelling lines: Central line, arterial line  Deescalation of antibiotics: Ampicillin/sulbactam      Juju Ryan M.D.

## 2024-06-14 NOTE — CARE PLAN
The patient is Watcher - Medium risk of patient condition declining or worsening    Shift Goals  Clinical Goals: hemodynamic stability  Patient Goals: RAGINI  Family Goals: updates    Progress made toward(s) clinical / shift goals:    Problem: Hemodynamics  Goal: Patient's hemodynamics, fluid balance and neurologic status will be stable or improve  Outcome: Progressing     Problem: Mechanical Ventilation  Goal: Safe management of artificial airway and ventilation  Outcome: Progressing     Problem: Pain - Standard  Goal: Alleviation of pain or a reduction in pain to the patient’s comfort goal  Outcome: Progressing       Patient is not progressing towards the following goals:

## 2024-06-14 NOTE — PROGRESS NOTES
4 Eyes Skin Assessment Completed by OMAYRA Up and OMAYRA Wang.    Head WDL  Ears WDL  Nose WDL  Mouth Redness and Bleeding to tongue    Neck WDL  Breast/Chest WDL  Shoulder Blades WDL  Spine abrasion mid spine  (R) Arm/Elbow/Hand WDL  (L) Arm/Elbow/Hand WDL  Abdomen WDL  Groin WDL  Scrotum/Coccyx/Buttocks Redness and Blanching  (R) Leg Swelling  (L) Leg Swelling  (R) Heel/Foot/Toe Redness, Blanching, and Boggy, calloused, cracking  (L) Heel/Foot/Toe Redness, Blanching, and Boggy, calloused, cracking           Devices In Places ECG, Blood Pressure Cuff, Pulse Ox, Hood, Arterial Line, SCD's, ET Tube, Central Line, and BMS      Interventions In Place Heel Mepilex, Sacral Mepilex, Heel Float Boots, TAP System, Pillows, Elbow Mepilex, Q2 Turns, Low Air Loss Mattress, Barrier Cream, and Heels Loaded W/Pillows    Possible Skin Injury yes    Pictures Uploaded Into Epic No, needs to be completed  Wound Consult Placed yes  RN Wound Prevention Protocol Ordered Yes

## 2024-06-14 NOTE — PROGRESS NOTES
Palliative Care    Room: T624    HPI:   Luis Alfredo Mora is a 39 y.o. male admitted 6/10/2024 with OOH cardiac arrest. Patient was found unresponsive with agonal respirations and CPR was initiated by bystander (patient's brother). EMS found patient in asystole and obtained ROSC after 2 rounds of CPR with epinephrine but subsequently lost pulses again and patient arrived to ED with CPR in progress.  UDS positive for amphetamines, cannabinoids, and fentanyl. Echocardiogram revealed EF of 10-15%.  Bronchoscopy performed 6/10/2024 due to presumed aspiration.  Patient remains intubated in the ICU. MRI 6/14/2024 with diffuse anoxic brain injury. Palliative care consulted to assist with GOC discussions, initial consultation on 6/13/2024.    Discussion/Plan   Met with patient's family including mother (Edie), brother (Nagi Glasgow), sister (Hui), step-mother (Remedios), and step-daughter along with Dr. Pina in conference room. Patient's father, Nagi Loja, was on the speaker phone. Discussed MRI results indicating anoxic brain injury and discussed that this is not reversible. Given this information, discussed the option of comfort focused care in more detail. All family present is in agreement that patient would not be accepting of his current state long term. They would like to proceed with comfort focused care and will discuss timing amongst themselves.    In the interim, recommend consideration of DNAR status.  All family is in agreement to allow patient to die naturally should cardiac arrest occur prior to comfort care transition.    PC APRN answered several questions from family about mortuary choice to the best of my ability.  Family inquired about work excuse notes.  Which PC APRN will provide.    Active listening, reflection, reminiscing, validation & normalization, and empathic support utilized throughout this encounter.  All questions answered and contact information provided, encouraged to call with any questions  or concerns.     48 minutes spent discussing advance care planning, this time excludes any other billed services.    ALVAREZ Sutton  Palliative Care Nurse Practitioner   [phone]

## 2024-06-14 NOTE — PROGRESS NOTES
Monitor Summary:   Sinus Rhythm, Sinus Tachycardia 90-100s   LA 0.136 / QRS 0.085 / QTC 0.477

## 2024-06-14 NOTE — PROGRESS NOTES
Critical Care Progress Note    Date of admission  6/10/2024    Chief Complaint  39 y.o. male admitted 6/10/2024 with cardiac arrest    Hospital Course  Mr. Sands is a 39 year old male with no known past medical history who was found down by his brother on 6/10/2024 with unknown downtime.  The brother initiated CPR and EMS was activated.  When EMS arrived, they found the patient in asystole requiring 2 rounds of CPR with epinephrine before ROSC was achieved.  Unfortunately, the patient lost pulses again and arrived to the ER with CPR in progress receiving more epinephrine and bicarbonate for severe acidemia.  The patient's work up revealed a urine drug screen positive for amphetamines, cannabinoids, and fentanyl.  He was admitted to the ICU for ongoing care.  6/11 - VD#2, no gag/cough/corneal, decorticate posturing with sternal rub, worsening renal function, updated family of very poor prognosis  6/12 - VD#3, no gag/cough/corneal, no posturing or w/d with noxious stimuli, creat now at 5, mother and sister in town-->updated with concerns that patient may proceed to brain death  6/13 - VD#4, no gag/cough/corneal, no w/d to pain, creat at 6 with very little UOP, Palliative consulted, MRI ordered, updated family    Interval Problem Update  Reviewed last 24 hour events:   - MRI last night   - Prop at 30-->will stop for neuro exam   - RASS -5   - no corneal/gag/cough, not w/d to pain   - Tmax 38   - SR/ST 90-100s   - SBP 90-120s   - TFs at goal   - BMS with 100cc overnight   - Erwin Pérez   - VD#5   - no SBT   - no CXR   - pepcid   - heparin   - day 5/5 of Unasyn   - MRI brain with severe anoxic brain injury   - WBCs 10.9   - Hb 10.2   - platelets 113   - K 4   - creat 8.28   -    - ALT 1427   - Mg 3.7   - CPK 4260    Yesterday's Events:   - pt with hypotension in the 70-80s for about 10 minutes and hypertensive getting prns   - stopped sedation this morning: no cough, gag, pt with twitching of eye lids making  assessment for corneal limited: no corneal on left   - no w/d or posturing with sternal rub   - [place] ongoing, Tmax 98.8   - pupils pinpoint: not reactive   - SR/ST 90-100s   - -160s   - Bicarb at 150cc/hr-->D/C   - NG with TFs at goal   - UOP of 175 cc overnight, Hood    - BMS: no output overnight   - left IJ TLC   - VD#4   - no CXR today   - SBT-->still triggers, very low volumes   - ABG of 7.6/30/100-->rate dropped from 32 to 26   - repeat 7.66/28/111-->stopped bicarb   - pepcid   - heparin   - day 4/5 of Unasyn   - BAL: negative   - WBCs 11   - K 3.6   - creat 6.27   - AST//2282   - CPK 86829   - phos 4.3   - Mg 3.5   - Ca of 6.8    **sedation resumed  for shivering with only propofol at 30.  STAT MRI brain ordered.  Palliative consulted    Review of Systems  Review of Systems   Unable to perform ROS: Intubated        Vital Signs for last 24 hours   Temp:  [36.3 °C (97.3 °F)-37.3 °C (99.1 °F)] 36.8 °C (98.2 °F)  Pulse:  [] 90  Resp:  [26-29] 26  BP: ()/(52-91) 107/59  SpO2:  [97 %-100 %] 98 %    Hemodynamic parameters for last 24 hours       Respiratory Information for the last 24 hours  Vent Mode: APVCMV  Rate (breaths/min): 26  Vt Target (mL): 400  PEEP/CPAP: 8  MAP: 11  Control VTE (exp VT): 400    Physical Exam   Physical Exam  Vitals and nursing note reviewed.   Constitutional:       General: He is not in acute distress.     Appearance: He is ill-appearing and toxic-appearing.   HENT:      Head: Normocephalic and atraumatic.      Right Ear: External ear normal.      Left Ear: External ear normal.      Nose: Nose normal. No rhinorrhea.      Mouth/Throat:      Mouth: Mucous membranes are moist.      Comments: ETT in place  Eyes:      General: No scleral icterus.     Extraocular Movements:      Right eye: No nystagmus.      Left eye: No nystagmus.      Conjunctiva/sclera:      Right eye: Right conjunctiva is injected. Chemosis present.      Left eye: Left conjunctiva is  injected. Chemosis present.      Comments: Pupils pinpoint   Neck:      Comments: Left IJ TLC (6/10)  Cardiovascular:      Rate and Rhythm: Regular rhythm. Tachycardia present.      Pulses: Normal pulses.      Heart sounds: No murmur heard.  Pulmonary:      Breath sounds: No wheezing.      Comments: Coarse breath sounds heard throughout  Chest:      Chest wall: No tenderness.   Abdominal:      Palpations: Abdomen is soft.      Tenderness: There is no abdominal tenderness. There is no guarding or rebound.   Genitourinary:     Comments: Hood in place  Musculoskeletal:      Cervical back: Normal range of motion and neck supple.      Right lower leg: No edema.      Left lower leg: No edema.   Lymphadenopathy:      Cervical: No cervical adenopathy.   Skin:     General: Skin is warm and dry.      Capillary Refill: Capillary refill takes less than 2 seconds.      Findings: No rash.   Neurological:      Comments: no cough/gag/corneal, no posturing or withdrawal with sternal rub   Psychiatric:      Comments: Unable to assess     No change to exam    Medications  Current Facility-Administered Medications   Medication Dose Route Frequency Provider Last Rate Last Admin    acetaminophen (Tylenol) tablet 650 mg  650 mg Enteral Tube Q6HRS PRN Viri Pina M.D.   650 mg at 06/12/24 1503    ampicillin/sulbactam (Unasyn) 3 g in  mL IVPB  3 g Intravenous Q12HRS Viri Pina M.D.   Stopped at 06/14/24 0544    artificial tears (Eye Lubricant) ophth ointment 1 Application  1 Application Both Eyes Q8HRS Viri Pina M.D.   1 Application at 06/14/24 0515    propofol (DIPRIVAN) injection  0-80 mcg/kg/min (Ideal) Intravenous Continuous Viri Pina M.D. 16.4 mL/hr at 06/14/24 0409 40 mcg/kg/min at 06/14/24 0409    hydrALAZINE (Apresoline) injection 20 mg  20 mg Intravenous Q4HRS PRN Viri Pina M.D.        labetalol (Normodyne/Trandate) injection 10-20 mg  10-20 mg Intravenous Q2HRS PRN Viri Pina M.D.    10 mg at 06/13/24 0841    heparin injection 5,000 Units  5,000 Units Subcutaneous Q8HRS Viri Pina M.D.   5,000 Units at 06/14/24 0515    carboxymethylcellulose (Refresh Tears) 0.5 % ophthalmic drops 1 Drop  1 Drop Both Eyes Q2HRS PRN Juju Ryan M.D.   1 Drop at 06/11/24 1600    fentaNYL (Sublimaze) injection 100 mcg  100 mcg Intravenous Q HOUR PRN Geoff Perry D.O.   100 mcg at 06/11/24 2328    fentanyl 50 mcg/mL infusion   mcg/hr Intravenous Continuous Geoff Perry D.O.   Stopped at 06/13/24 0719    Respiratory Therapy Consult   Nebulization Continuous RT Pedrito Gross M.D.        famotidine (Pepcid) tablet 20 mg  20 mg Enteral Tube Q24HRS Pedrito Gross M.D.   20 mg at 06/14/24 0515    senna-docusate (Pericolace Or Senokot S) 8.6-50 MG per tablet 2 Tablet  2 Tablet Enteral Tube BID Pedrito Gross M.D.   2 Tablet at 06/13/24 0524    And    polyethylene glycol/lytes (Miralax) Packet 1 Packet  1 Packet Enteral Tube QDAY PRN Pedrito Gross M.D.        And    magnesium hydroxide (Milk Of Magnesia) suspension 30 mL  30 mL Enteral Tube QDAY PRN Pedrito Gross M.D.        And    bisacodyl (Dulcolax) suppository 10 mg  10 mg Rectal QDAY PRN Pedrito Gross M.D.        lidocaine (Xylocaine) 1 % injection 2 mL  2 mL Tracheal Tube Q30 MIN PRN Pedrito Gross M.D.        Pharmacy Consult: Enteral tube insertion - review meds/change route/product selection  1 Each Other PHARMACY TO DOSE Pedrito Gross M.D.        HYDROmorphone (Dilaudid) injection 0.5-1 mg  0.5-1 mg Intravenous Q HOUR PRN Pedrito Gross M.D.   1 mg at 06/11/24 2108    midazolam (Versed) injection 1-5 mg  1-5 mg Intravenous Q HOUR PRN Pedrito Gross M.D.        ipratropium-albuterol (DUONEB) nebulizer solution  3 mL Nebulization Q2HRS PRN (RT) Pedrito Gross M.D.           Fluids    Intake/Output Summary (Last 24 hours) at 6/14/2024 0635  Last data filed at 6/14/2024 0600  Gross per 24 hour   Intake 1649.32 ml   Output 210 ml   Net  1439.32 ml       Laboratory  Recent Labs     06/13/24  0642 06/13/24  1331 06/13/24  1928   ISTATAPH 7.659* 7.562* 7.561*   ISTATAPCO2 28.4 37.4* 37.2*   ISTATAPO2 112* 93* 94*   ISTATATCO2 33 35* 35*   FHZUUMM0CDF 99 98 98   ISTATARTHCO3 32.0* 33.6* 33.4*   ISTATARTBE 11* 11* 10*   ISTATTEMP 36.7 C 36.8 C 36.8 C   ISTATFIO2 30 28 28   ISTATSPEC Arterial Arterial Arterial   ISTATAPHTC 7.664* 7.565* 7.565*   DINYCFTB9YO 111* 92* 93*     Recent Labs     06/12/24  0510 06/13/24  0506 06/14/24  0420   CPKTOTAL 44106* 86767* 4260*     Recent Labs     06/12/24  0510 06/12/24  1030 06/13/24  0506 06/13/24  1052 06/13/24  1742 06/13/24 2007 06/13/24  2322 06/14/24  0420   SODIUM 136   < > 132* 133* 133* 134*  --  133*   POTASSIUM 4.9   < > 3.6 3.8 3.7 3.8  --  4.0   CHLORIDE 95*   < > 86* 88* 87* 86*  --  87*   CO2 23   < > 28 28 28 29  --  28   BUN 66*   < > 89* 93* 98* 98*  --   --    CREATININE 4.36*   < > 6.27* 6.87* 7.33* 7.65*  --  8.28*   MAGNESIUM 3.5*   < > 3.5* 3.5* 3.5*  --  3.7* 3.7*   PHOSPHORUS 7.3*  --  4.3  --   --   --   --   --    CALCIUM 7.3*   < > 6.8* 7.4* 7.1* 7.0*  --  7.1*    < > = values in this interval not displayed.     Recent Labs     06/11/24  2319 06/12/24  0510 06/12/24  1030 06/13/24  0506 06/13/24  1052 06/13/24  1742 06/13/24  2007 06/14/24  0420   ALTSGPT 4343* 3745*  --  2282*  --   --   --  1427*   ASTSGOT 3627* 2520*  --  900*  --   --   --  445*   ALKPHOSPHAT 120* 120*  --  114*  --   --   --  119*   TBILIRUBIN 1.3 1.4  --  1.4  --   --   --  1.2   DBILIRUBIN 0.9*  --   --   --   --   --   --   --    GLUCOSE 105* 102*   < > 125*   < > 129* 118* 102*    < > = values in this interval not displayed.     Recent Labs     06/12/24  0510 06/13/24  0506 06/14/24 0420   WBC 15.9* 11.0* 10.9*   NEUTSPOLYS 86.20* 79.90* 77.70*   LYMPHOCYTES 5.20* 11.10* 10.60*   MONOCYTES 8.60 7.10 8.90   EOSINOPHILS 0.00 0.80 2.00   BASOPHILS 0.00 0.20 0.20   ASTSGOT 2520* 900* 445*   ALTSGPT 3745* 2282*  1427*   ALKPHOSPHAT 120* 114* 119*   TBILIRUBIN 1.4 1.4 1.2     Recent Labs     06/12/24  0510 06/13/24  0506 06/14/24  0420   RBC 4.70 4.32* 3.83*   HEMOGLOBIN 12.8* 11.9* 10.2*   HEMATOCRIT 36.8* 32.2* 28.8*   PLATELETCT 185 151* 113*       Imaging  ECHO:  CONCLUSIONS  Severely reduced left ventricular systolic function.  The left ventricular ejection fraction is visually estimated to be less than 10-15%.  Unable to accurately estimate diastolic function.  The right ventricle is not well visualized, but appears dilated with severe reduced systolic function.  Dilated inferior vena cava without inspiratory collapse.    Assessment/Plan  * Cardiac arrest (HCC)- (present on admission)  Assessment & Plan  Found unresponsive in asystole  Suspected fentanyl overdose with UDS + for amphetamines, fentanyl, cannabinoids  No acute ischemic change on EKG, CTA negative for PE, head CT unremarkable  Continue postarrest care with normothermia protocol, maintain euvolemia, lung protective ventilation, eucapnia, euglycemia  ECHO with Bi-V failure and will need evaluation if a candidate     Goals of care, counseling/discussion  Assessment & Plan  6/12 - pt's mother and sister came from [place].  I gave them a detailed update of the patient's condition, prolonged downtime, concerns for severe non reversible brain injury due to hypoxia, and poor prognosis.  I explained that his exam was showing signs of brain death with lack of corneal/cough/gag reflexes and no w/d with painful stimuli.  Family demonstrated understanding and very emotional.  6/13 - pt's neuro exam unchanged.  I broached the subject of making the patient a DNR.  I consulted Palliative to assist with care.    Non-traumatic rhabdomyolysis- (present on admission)  Assessment & Plan  ?due to prolonged downtime vs amphetamine use  Trend CPK-->improving   Stop Bicarbonate infusion    Shock (HCC)  Assessment & Plan  Undifferentiated shock post asystolic cardiac arrest has  RESOLVED  MAP goals > 65,  Off vasopressors  ECHO with reduced biventricular function  No evidence of PE, tamponade or acute ischemic change    Acute encephalopathy  Assessment & Plan  Suspicion for anoxic encephalopathy due to out of hospital cardiac arrest with unknown downtime  Toxic encephalopathy due to drug ingestion remains in differential  Continue supportive care, normothermia protocol  Initial head CT unremarkable  Stat EEG: no seizures  MRI brain consistent with severe anoxic brain injury  Due to worsening clinical exam, concern for progression to brain death    Lactic acidosis  Assessment & Plan  Resolved   Secondary to cardiac arrest  S/p IVF resuscitation and bicarbonate  Trend lactic acid level    Transaminitis- (present on admission)  Assessment & Plan  Suspect ischemic hepatopathy due to cardiac arrest  Hepatitis panel was negative  Cont to improve    Acute kidney injury (HCC)  Assessment & Plan  Suspect component of ATN from cardiac arrest  Limit nephrotoxins  Monitor UOP/creat  May need nephrology consult, but suspect not an RRT candidate due to severe neurological injury from cardiac arrest.  Stopped bicarb infusion due to alkalemia on ABG.  Pt is now anuric    Acute respiratory failure with hypoxia (HCC)  Assessment & Plan  Out of hospital cardiac arrest and intubated on 6/10/2024  Cont full ventilator support  RT/O2 protocols  Ventilator bundle protocols  I am actively adjusting ventilator settings based on ABGs/vent mechanics  S/p bronchoscopy with BAL on 6/10, cont empiric Unasyn, follow cultures  Holding sedation for neuro exam today           VTE:  Heparin  Ulcer: H2 Antagonist  Lines: Central Line  Ongoing indication addressed, Arterial Line  Ongoing indication addressed, and Hood Catheter  Ongoing indication addressed    I have performed a physical exam and reviewed and updated ROS and Plan today (6/14/2024). In review of yesterday's note (6/13/2024), there are no changes except as  documented above.     Discussed patient condition and risk of morbidity and/or mortality with Family, RN, RT, Pharmacy, Charge nurse / hot rounds, and palliative care    The patient remains critically ill.  I have assessed and reassessed the respiratory status and made ventilator adjustments based upon arterial blood gas analysis, ventilator waveforms and airway mechanics.  I have assessed and reassessed the blood pressure, hemodynamics, cardiovascular status. This patient remains at high risk for worsening cardiopulmonary dysfunction and death without the above critical care interventions.    Critical care time = 110 minutes in directly providing and coordinating critical care and extensive data review.  No time overlap and excludes procedures.

## 2024-06-14 NOTE — PROGRESS NOTES
Lab called with critical result of CPK 4260, ALT 1427, and Creatinine 8.28 at 0516. Critical lab result read back to Lab.   Resident Charity notified of critical lab result at 0526.  Critical lab result read back by Dr. Nash.

## 2024-06-14 NOTE — PROGRESS NOTES
Cardiology Follow Up Progress Note    Date of Service  6/14/2024    Attending Physician  Viri Pina M.D.    Chief Complaint   Cardiac arrest    HPI  Luis Alfredo Sands is a 39 y.o. male admitted 6/10/2024 with cardiac arrest likely secondary to hypoxia from acute drug intoxication    Interim Events  Patient unable to provide history due to ongoing respiratory failure requiring ongoing mechanical support.  There were no acute events per nursing staff.  Telemetry monitor demonstrated sinus tachycardia and sinus rhythm.  Review of Systems  Review of Systems    Vital signs in last 24 hours  Temp:  [2.7 °C (36.9 °F)-37.2 °C (99 °F)] 2.7 °C (36.9 °F)  Pulse:  [] 94  Resp:  [26-29] 26  BP: ()/(52-91) 107/57  SpO2:  [97 %-100 %] 98 %    Physical Exam  Physical Exam  Constitutional:       Appearance: Normal appearance. He is ill-appearing.   HENT:      Head: Normocephalic and atraumatic.      Mouth/Throat:      Mouth: Mucous membranes are moist.      Pharynx: Oropharynx is clear.   Eyes:      Extraocular Movements: Extraocular movements intact.      Conjunctiva/sclera: Conjunctivae normal.   Cardiovascular:      Rate and Rhythm: Normal rate and regular rhythm.      Pulses: Normal pulses.      Heart sounds: Normal heart sounds. No murmur heard.     No friction rub. No gallop.   Pulmonary:      Effort: Pulmonary effort is normal.      Breath sounds: Rhonchi present. No wheezing or rales.      Comments: Intubated with endotracheal tube taped at the lips  Abdominal:      General: Bowel sounds are normal. There is no distension.      Palpations: Abdomen is soft.   Musculoskeletal:         General: Normal range of motion.      Cervical back: Normal range of motion and neck supple.      Right lower leg: No edema.      Left lower leg: No edema.   Skin:     General: Skin is warm and dry.   Neurological:      General: No focal deficit present.      Mental Status: Mental status is at baseline.   Psychiatric:         Mood  "and Affect: Mood normal.         Behavior: Behavior normal.         Thought Content: Thought content normal.         Judgment: Judgment normal.         Lab Review  Lab Results   Component Value Date/Time    WBC 10.9 (H) 06/14/2024 04:20 AM    RBC 3.83 (L) 06/14/2024 04:20 AM    HEMOGLOBIN 10.2 (L) 06/14/2024 04:20 AM    HEMATOCRIT 28.8 (L) 06/14/2024 04:20 AM    MCV 75.2 (L) 06/14/2024 04:20 AM    MCH 26.6 (L) 06/14/2024 04:20 AM    MCHC 35.4 06/14/2024 04:20 AM    MPV 11.3 06/14/2024 04:20 AM      Lab Results   Component Value Date/Time    SODIUM 133 (L) 06/14/2024 04:20 AM    POTASSIUM 4.0 06/14/2024 04:20 AM    CHLORIDE 87 (L) 06/14/2024 04:20 AM    CO2 28 06/14/2024 04:20 AM    GLUCOSE 102 (H) 06/14/2024 04:20 AM     (H) 06/14/2024 04:20 AM    CREATININE 8.28 (HH) 06/14/2024 04:20 AM      Lab Results   Component Value Date/Time    ASTSGOT 445 (H) 06/14/2024 04:20 AM    ALTSGPT 1427 (HH) 06/14/2024 04:20 AM     Lab Results   Component Value Date/Time    TRIGLYCERIDE 66 06/10/2024 07:29 AM    TROPONINT 89 (H) 06/10/2024 07:29 AM       No results for input(s): \"NTPROBNP\" in the last 72 hours.    Cardiac Imaging and Procedures Review  Echocardiogram: Severe LV systolic dysfunction with estimate ejection fraction of 10 to 15%, severe reduction in RV systolic function with elevated right atrial pressure estimated at 15 mmHg      Assessment/Plan  1.  Cardiac arrest status post CPR bystander as well as EMS CPR with return of spontaneous circulation  2.  Marijuana use  3.  Alcohol use  4.  Amphetamine and fentanyl toxicity    Clinically, he is doing very poorly and likely suffered a drug overdose leading to progressive hypoxia with subsequent hypoxic cardiac arrest.  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  At this time patient appears to still be in multisystem organ failure and requiring ongoing mechanical ventilation but by laboratory evaluation has seen some improvement.  Ongoing acute kidney injury with worsening creatinine and noted resuscitative fluids of +15 L since admission.  An extensive discussion was had with Dr. Alvares of the critical care service regarding overall prognosis.  He is not able to be instituted on goal-directed medical therapy for his likely nonischemic cardiomyopathy at this time given ongoing pressor support.  We will continue to follow closely and institute appropriate medical therapy if clinically indicated.        Critical care time: 31 minutes was utilized in direct face-to-face patient contact, discussion with family and procuring information from family, chart review, chart preparation, laboratory review, imaging review, EKG review and discussion and a multidisciplinary team approach          Thank you for allowing me to participate in the care of this patient.  I will continue to follow this patient    Please contact me with any questions.    Clinton Lion D.O.   Cardiologist, [facility]  [phone]

## 2024-06-15 NOTE — CARE PLAN
The patient is Unstable - High likelihood or risk of patient condition declining or worsening    Shift Goals: Q2 turn patient, central line dressing changes, Oral care.   Clinical Goals: Hemodynamic stability  Patient Goals: RAGINI  Family Goals: updates    Progress made toward(s) clinical / shift goals:  Patient was given full bed bath with CHG and linen change. Patient not on any pressors or sedation and VSS.     Patient is not progressing towards the following goals:

## 2024-06-15 NOTE — PROGRESS NOTES
Patient transported to CT scanner on transport monitor and ventilator with RN and RT present. Vital signs stable throughout transport, patient returned back to room uneventfully.

## 2024-06-15 NOTE — PROCEDURES
Procedure Note    Date: 6/15/2024  Time: 0700    Procedure: Bronchoscopy with bronchoalveolar lavage and therapeutic aspiration of secretions from bronchi    Indication: lung surveillance for donor team, acute hypoxic respiratory failure    Consent: Informed consent obtained from patient or designated decision maker after explaining the benefits/risks of the procedure including but not limited to bleeding, infection, airway trauma or loss therof, pneumothorax/hemothorax, arrythmia, or death. Patient or surrogate expressed understanding and agreement.    Time-out: Verbal consent was obtained. Immediately prior to procedure, a time out was called to verify the correct patient, procedure, equipment, support staff and site/side marked as required. Pre-procedure pain reported as 0/10 and post-procedure was 0/10.    Procedure: After obtaining consent, a time-out was performed. Respiratory therapy and nursing at bedside throughout procedure. Patient provided sedation and analgesia throughout the procedure. Placed on full ventilator support with an FiO2 of 100% throughout the procedure. Using a fiberoptic bronchoscope, trachea entered via 8.0 ETT.  0 mL of local anesthetic sprayed at the shantelle (2% lidocaine) achieving appropriate comfort level for patient. Airways visualized directly and the following intervention was performed: diagnostic and therapeutic lavage with all areas of lungs suctioned to clear. Findings as below. Patient tolerated procedure well without any difficulties and left in care of bedside nurse/RT.     Medications: Dilaudid 1mg IVP    Findings:     Upper airway - Not visualized as bronchoscope passed through ETT.    Trachea to shantelle - moderately inflamed appearing mucosa without lesions or mass, ETT tip measured 4-5 cm from the shantelle.    Right mainstem, RUL, RML, RLL and distal airways - moderately inflamed appearing mucosa without mass/lesion/anatomic variance, secretions: thick white/tan noted only  to RML anterior segment that was suctioned to clear    Left mainstem, HARIS, lingula, LLL and distal airways - moderately inflamed appearing mucosa without mass/lesion/anatomic variance, secretions: copious thick white/tan secretions noted throughout the left lung starting at the left mainstem that were lavaged multiple times and suctioned to clear during multiple passes          Samples - LLL of bronchi sent for micro analysis    Complications: none  CXR (if applicable): n/a    Viri Pina MD  Pulmonary and Critical Care Medicine

## 2024-06-15 NOTE — PROGRESS NOTES
[facility] ICU Progress Note      Admit Date: 6/10/2024    Resident(s): Juju Ryan M.D.   Attending:  ARACELIS FITZGERALD/ Dr. Pina    Patient ID:    Name:  Luis Alfredo Sands     YOB: 1985  Age:  39 y.o.  male   MRN:  [id number]    Hospital Course (carried forward and updated):  Luis Alfredo Sands is a 39 y.o. male with a PMH of polysubstance use disorder, admitted 06/10/2024 for cardiac arrest.    Consultants:  Critical Care  Cardiology     Interval Events:    06/11 - Hypertensive overnight, has not required pressor therapy. Intermittent episodes of myoclonus, EEG without evidence of seizures. Off sedation, no corneal or gag reflex, responsive to pain in upper extremities only.     06/12 - Febrile overnight, shivering, normothermia protocol initiated. No corneal, cough, or gag reflex. Now unresponsive to pain in all extremities. Fixed pupils. Now off propofol.    06/13 - Neurologic status remains unchanged. Labile blood pressures overnight. Discontinuing sodium bicarbonate gtt. Plan for MRI brain and palliative care consultation.    06/14 - Neurologic exam remains unchanged with absent corneal, cough, and gag reflex, pupils fixed. MRI brain showed evidence of anoxic brain injury. Discontinuing fentanyl. Sinus rhythm to sinus tachycardia with SBP 90-120s overnight. Anuric. CODE STATUS changed to DNAR/I OK after goals of care discussion.    06/15 - Unchanged neurologic exam. Pending transition to COMFORT CARE, patient is a registered donor.     Vitals Range last 24h:  Temp:  [36.6 °C (97.9 °F)-37.1 °C (98.8 °F)] 36.6 °C (97.9 °F)  Pulse:  [] 92  Resp:  [19-36] 23  BP: (116-164)/(58-92) 159/90  SpO2:  [94 %-100 %] 98 %      Intake/Output Summary (Last 24 hours) at 6/15/2024 0947  Last data filed at 6/15/2024 0800  Gross per 24 hour   Intake 1227.12 ml   Output 478 ml   Net 749.12 ml        Review of Systems   Reason unable to perform ROS: Intubated.       PHYSICAL EXAM:  Vitals:    06/15/24 0648 06/15/24 0700  06/15/24 0800 06/15/24 0900   BP:  (!) 161/90 135/78 (!) 159/90   Pulse: 86 86 87 92   Resp: (!) 24 (!) 27 (!) 26 (!) 23   Temp:   36.6 °C (97.9 °F)    TempSrc:   Bladder    SpO2: 98% 99% 98% 98%   Weight:       Height:        Body mass index is 27.83 kg/m².    O2 therapy: Pulse Oximetry: 98 %, O2 Delivery Device: Ventilator    Date 06/15/24 0700 - 06/16/24 0659   Shift 6070-9287 7302-3111 6119-5018 24 Hour Total   INTAKE   P.O. 0   0     P.O. 0   0   Other         Balloon Inflated (mL) (Rectal Tube) 1 x   1 x   NG/GT 90   90     Intake (mL) (Enteral Tube 06/10/24 Nasogastric 16 Fr. Right nare) 90   90   Shift Total 90   90   OUTPUT   Shift Total       NET 90   90              Physical Exam  Constitutional:       Appearance: He is normal weight. He is ill-appearing.      Interventions: He is intubated.   HENT:      Head: Normocephalic and atraumatic.   Eyes:      General: No scleral icterus.     Conjunctiva/sclera: Conjunctivae normal.   Cardiovascular:      Rate and Rhythm: Normal rate and regular rhythm.      Heart sounds: No murmur heard.  Pulmonary:      Effort: He is intubated.      Breath sounds: Rhonchi present. No wheezing.   Abdominal:      General: Abdomen is flat. Bowel sounds are decreased.      Palpations: Abdomen is soft.   Musculoskeletal:      Right lower leg: No edema.      Left lower leg: No edema.   Skin:     General: Skin is warm and dry.   Neurological:      Mental Status: He is unresponsive.      Comments: Unresponsive to painful stimuli in all extremities, absent corneal/cough/gag reflex, does not follow commands.         Recent Labs     06/13/24  1928 06/14/24  1049 06/15/24  0016 06/15/24  0054   XYRBY11T  --   --   --  7.48   CLVXPW815H  --   --   --  39.4*   PNGVP802P  --   --   --  82.1   MUJU2KCS  --   --   --  95.3   ARTHCO3  --   --   --  29*   [id number]  --   --   --  N/A   ARTBE  --   --   --  5*   ISTATAPH 7.561* 7.551* 7.501*  --    ISTATAPCO2 37.2* 38.4* 44.6*  --    ISTATAPO2 94*  99* 344*  --    ISTATATCO2 35* 35* 36*  --    ALQDOOV6UUK 98 98 100*  --    ISTATARTHCO3 33.4* 33.6* 34.9*  --    ISTATARTBE 10* 10* 11*  --    ISTATTEMP 36.8 C 36.8 C 36.9 C  --    ISTATFIO2 28 30 100  --    ISTATSPEC Arterial Arterial Arterial  --    ISTATAPHTC 7.565* 7.554* 7.503*  --    ZUCYVYVD4EJ 93* 98* 343*  --      Recent Labs     06/14/24  2155 06/15/24  0054 06/15/24  0541   SODIUM 135 136 135   POTASSIUM 4.6 4.6 4.8   CHLORIDE 87* 86* 86*   CO2 26 28 28   * 108* 108*   CREATININE 10.49* 10.14* 4.40*   MAGNESIUM 4.4* 4.3* 4.5*   PHOSPHORUS 8.2* 9.1* 9.8*   CALCIUM 7.7* 7.8* 8.0*     Recent Labs     06/14/24  0420 06/14/24  1047 06/14/24  2155 06/15/24  0054 06/15/24  0541   ALTSGPT 1427*  --   --  1050* 1020*   ASTSGOT 445*  --   --  301* 286*   ALKPHOSPHAT 119*  --   --  197* 221*   TBILIRUBIN 1.2  --   --  1.0 0.9   DBILIRUBIN  --   --  0.9* 0.8* 0.7*   GAMMAGT  --   --  229* 247* 269*   AMYLASE  --   --  215*  --   --    LIPASE  --   --   --  417*  --    GLUCOSE 102*   < > 135* 128* 132*    < > = values in this interval not displayed.     Recent Labs     06/14/24  0420 06/15/24  0054 06/15/24  0541   RBC 3.83* 3.80* 3.78*   HEMOGLOBIN 10.2* 10.4* 10.1*   HEMATOCRIT 28.8* 29.4* 29.4*   PLATELETCT 113* 115* 115*   PROTHROMBTM  --  13.4 13.5   APTT  --  35.1 33.7   INR  --  1.01 1.02     Recent Labs     06/14/24  0420 06/15/24  0054 06/15/24  0541   WBC 10.9* 16.4* 17.0*   NEUTSPOLYS 77.70* 84.00* 86.40*   LYMPHOCYTES 10.60* 6.00* 5.10*   MONOCYTES 8.90 7.90 7.10   EOSINOPHILS 2.00 0.80 0.50   BASOPHILS 0.20 0.20 0.10   ASTSGOT 445* 301* 286*   ALTSGPT 1427* 1050* 1020*   ALKPHOSPHAT 119* 197* 221*   TBILIRUBIN 1.2 1.0 0.9       Meds:   HYDROmorphone   1 mg/hr (06/15/24 0817)    acetaminophen  650 mg      artificial tears  1 Application      propofol  0-80 mcg/kg/min (Ideal) Stopped (06/14/24 0944)    hydrALAZINE  20 mg      labetalol  10-20 mg      heparin  5,000 Units      carboxymethylcellulose   1 Drop      Respiratory Therapy Consult        famotidine  20 mg      senna-docusate  2 Tablet      And    polyethylene glycol/lytes  1 Packet      And    magnesium hydroxide  30 mL      And    bisacodyl  10 mg      lidocaine  2 mL      Pharmacy  1 Each      HYDROmorphone  0.5-1 mg      midazolam  1-5 mg      ipratropium-albuterol  3 mL          Procedures:  06/10 - Intubation, arterial line, central line, bronchoscopy with BAL    Imaging:  EC-ECHOCARDIOGRAM COMPLETE W/ CONT   Final Result      DX-CHEST-LIMITED (1 VIEW)   Final Result      No significant change      DX-CHEST-LIMITED (1 VIEW)   Final Result      1.  Unchanged central and LEFT greater than RIGHT basilar pulmonary opacities likely a combination of atelectasis and edema or pneumonia   2.  Slight interval retraction of endotracheal tube      DX-CHEST-LIMITED (1 VIEW)   Final Result      1.  Increased central and LEFT greater than RIGHT basilar pulmonary opacities likely a combination of atelectasis and edema or pneumonia   2.  Slight interval retraction of endotracheal tube      MR-BRAIN-W/O   Final Result         The diffusion-weighted sequences demonstrates diffuse area of restricted diffusion in the supratentorial gray matter. There are also areas of restricted diffusion in the bilateral basal ganglia. These findings are consistent with diffuse anoxic brain    injury.      DX-CHEST-PORTABLE (1 VIEW)   Final Result         1.  No acute cardiopulmonary disease.      DX-CHEST-PORTABLE (1 VIEW)   Final Result         1.  No acute cardiopulmonary disease.   2.  Endotracheal tube terminates at T3, could be advanced 1 to 2 cm.      DX-CHEST-PORTABLE (1 VIEW)   Final Result         1.  No acute cardiopulmonary disease.      EC-ECHOCARDIOGRAM COMPLETE W/ CONT   Final Result      DX-CHEST-PORTABLE (1 VIEW)   Final Result      1.  Interval placement of left internal jugular central venous catheter.   2.  Interval placement of enteric sump tube.   3.  No evidence  of procedure-related pneumothorax.      DX-ABDOMEN FOR TUBE PLACEMENT   Final Result      Enteric tube tip projects over the upper stomach      CT-CTA CHEST PULMONARY ARTERY W/ RECONS   Final Result      1.  No central or segmental pulmonary embolus   2.  BILATERAL dependent airspace disease airway fluid suspicious for aspiration pneumonia   3.  Hepatic steatosis   4.  Trace LEFT anterior chest wall gas of uncertain etiology            CT-HEAD W/O   Final Result      No acute intracranial abnormality.            DX-CHEST-PORTABLE (1 VIEW)   Final Result         1.  No acute cardiopulmonary disease.      DX-CHEST-LIMITED (1 VIEW)    (Results Pending)   DX-CHEST-LIMITED (1 VIEW)    (Results Pending)   CT-CHEST,ABDOMEN,PELVIS W/O    (Results Pending)       ASSESSEMENT and PLAN:    * Cardiac arrest (HCC)- (present on admission)  Assessment & Plan  Likely secondary to overdose in the setting of polysubstance use disorder. ECHO showed EF 10-15%, severely reduced LV systolic function.  -Normothermia protocol    Acute encephalopathy  Assessment & Plan  Likely anoxic brain injury in the setting of cardiac arrest. EEG with findings of severe diffuse cerebral function. Elevated .7.  MRI brain showed evidence of diffuse anoxic brain injury.    Acute respiratory failure with hypoxia (HCC)  Assessment & Plan  Secondary to aspiration pneumonia in the setting of cardiac arrest. CTA thorax with findings of bilateral dependent airspace disease with fluid in airways.   Intubated 06/10.  Completed 5 day course of ampicillin/sulbactam.    Lactic acidosis  Assessment & Plan  Secondary to cardiac arrest.  Previously on sodium bicarbonate gtt.    Transaminitis- (present on admission)  Assessment & Plan  Secondary to ischemic hepatitis in the setting of cardiac arrest. Hepatitis panel negative.  -Avoid hepatotoxins    Acute kidney injury (HCC)  Assessment & Plan  Secondary to ATN in the setting of cardiac arrest. Cr 2.35 on admission  -> 8.28.  -Avoid nephrotoxins, renally dose medications    Polysubstance use disorder  Assessment & Plan  UDS positive for amphetamines, cannabinoids, fentanyl.         DISPO: Remains inpatient ICU, poor prognosis    CODE STATUS: DNAR/I OK    Quality Measures:  Feeding: Tube feeds  Analgesia: None  Sedation: Propofol  Thromboprophylaxis: None  Head of bed: >30 degrees  Ulcer prophylaxis: Famotidine  Glycemic control: Correctional: None / Basal: None  Bowel care: Bowel regimen  Indwelling lines: Central line, arterial line  Deescalation of antibiotics: Ampicillin/sulbactam      Juju Ryan M.D.

## 2024-06-15 NOTE — PROGRESS NOTES
Critical Care Progress Note    Date of admission  6/10/2024    Chief Complaint  39 y.o. male admitted 6/10/2024 with cardiac arrest    Hospital Course  Mr. Sands is a 39 year old male with no known past medical history who was found down by his brother on 6/10/2024 with unknown downtime.  The brother initiated CPR and EMS was activated.  When EMS arrived, they found the patient in asystole requiring 2 rounds of CPR with epinephrine before ROSC was achieved.  Unfortunately, the patient lost pulses again and arrived to the ER with CPR in progress receiving more epinephrine and bicarbonate for severe acidemia.  The patient's work up revealed a urine drug screen positive for amphetamines, cannabinoids, and fentanyl.  He was admitted to the ICU for ongoing care.  6/11 - VD#2, no gag/cough/corneal, decorticate posturing with sternal rub, worsening renal function, updated family of very poor prognosis  6/12 - VD#3, no gag/cough/corneal, no posturing or w/d with noxious stimuli, creat now at 5, mother and sister in town-->updated with concerns that patient may proceed to brain death  6/13 - VD#4, no gag/cough/corneal, no w/d to pain, creat at 6 with very little UOP, Palliative consulted, MRI ordered, updated family  6/14 - VD#5, no gag/cough/corneal, no w/d to pain, creat to 8, anuric, MRI brain with anoxic brain injury    Interval Problem Update  Reviewed last 24 hour events:   - no events overnight   - family has chose comfort care, pt is a registered donor   - no sedation: spontaneously swallowing, no w/d to painful stimuli, no cough/gag, ?corneal to right eye   - will start dilaudid infusion   - SR/ST 90-100s   - -160s   - Tmax 98.8   - TFs at goal   - UOP: 63 cc overnight, Hood   - BMS with 300cc overnight   - level 1 mobility   - pt is 16 liters (+)   - VD#6   - CXR(reviewed): left base opacities, mild cephalization   - no SBT   - heparin   - pepcid   - K 4.8   - creat 10   -    - ALT 1020   - Mg  4.5   - CPK 2352    Yesterday's Events:   - MRI last night   - Prop at 30-->will stop for neuro exam   - RASS -5   - no corneal/gag/cough, not w/d to pain   - Tmax 38   - SR/ST 90-100s   - SBP 90-120s   - TFs at goal   - BMS with 100cc overnight   - Erwin Pérez   - VD#5   - no SBT   - no CXR   - pepcid   - heparin   - day 5/5 of Unasyn   - MRI brain with severe anoxic brain injury   - WBCs 10.9   - Hb 10.2   - platelets 113   - K 4   - creat 8.28   -    - ALT 1427   - Mg 3.7   - CPK 4260    Review of Systems  Review of Systems   Unable to perform ROS: Intubated        Vital Signs for last 24 hours   Temp:  [36.7 °C (98.1 °F)-37.1 °C (98.8 °F)] 37 °C (98.6 °F)  Pulse:  [] 103  Resp:  [19-36] 25  BP: (107-164)/(57-92) 164/86  SpO2:  [94 %-100 %] 94 %    Hemodynamic parameters for last 24 hours  CVP:  [9 MM HG-13 MM HG] 9 MM HG    Respiratory Information for the last 24 hours  Vent Mode: APVCMV  Rate (breaths/min): 26  Vt Target (mL): 400  PEEP/CPAP: 8  MAP: 12  Control VTE (exp VT): 387    Physical Exam   Physical Exam  Vitals and nursing note reviewed.   Constitutional:       General: He is not in acute distress.     Appearance: He is ill-appearing and toxic-appearing.   HENT:      Head: Normocephalic and atraumatic.      Right Ear: External ear normal.      Left Ear: External ear normal.      Nose: Nose normal. No rhinorrhea.      Mouth/Throat:      Mouth: Mucous membranes are moist.      Comments: ETT in place  Eyes:      General: No scleral icterus.     Extraocular Movements:      Right eye: No nystagmus.      Left eye: No nystagmus.      Conjunctiva/sclera:      Right eye: Right conjunctiva is injected. Chemosis present.      Left eye: Left conjunctiva is injected. Chemosis present.      Comments: Pupils pinpoint   Neck:      Comments: Left IJ TLC (6/10)  Cardiovascular:      Rate and Rhythm: Regular rhythm. Tachycardia present.      Pulses: Normal pulses.      Heart sounds: No murmur  heard.  Pulmonary:      Breath sounds: No wheezing.      Comments: diminished throughout  Chest:      Chest wall: No tenderness.   Abdominal:      Palpations: Abdomen is soft.      Tenderness: There is no abdominal tenderness. There is no guarding or rebound.   Genitourinary:     Comments: Hood in place  Musculoskeletal:      Cervical back: Normal range of motion and neck supple.      Right lower leg: No edema.      Left lower leg: No edema.   Lymphadenopathy:      Cervical: No cervical adenopathy.   Skin:     General: Skin is warm and dry.      Capillary Refill: Capillary refill takes less than 2 seconds.      Findings: No rash.   Neurological:      Comments: no cough/gag/=, ? Corneal to right eye, no posturing or withdrawal with sternal rub, spontaneously swallowing   Psychiatric:      Comments: Unable to assess         Medications  Current Facility-Administered Medications   Medication Dose Route Frequency Provider Last Rate Last Admin    acetaminophen (Tylenol) tablet 650 mg  650 mg Enteral Tube Q6HRS PRN Viri Pina M.D.   650 mg at 06/12/24 1503    artificial tears (Eye Lubricant) ophth ointment 1 Application  1 Application Both Eyes Q8HRS Viri Pina M.D.   1 Application at 06/15/24 0600    propofol (DIPRIVAN) injection  0-80 mcg/kg/min (Ideal) Intravenous Continuous Viri Pina M.D.   Stopped at 06/14/24 0944    hydrALAZINE (Apresoline) injection 20 mg  20 mg Intravenous Q4HRS PRN Viri Pina M.D.        labetalol (Normodyne/Trandate) injection 10-20 mg  10-20 mg Intravenous Q2HRS PRN Viri Pina M.D.   10 mg at 06/15/24 0630    heparin injection 5,000 Units  5,000 Units Subcutaneous Q8HRS Viri Pina M.D.   5,000 Units at 06/15/24 0523    carboxymethylcellulose (Refresh Tears) 0.5 % ophthalmic drops 1 Drop  1 Drop Both Eyes Q2HRS PRN Juju Ryan M.D.   1 Drop at 06/14/24 2228    Respiratory Therapy Consult   Nebulization Continuous RT Pedrito Gross M.D.         famotidine (Pepcid) tablet 20 mg  20 mg Enteral Tube Q24HRS Pedrito Gross M.D.   20 mg at 06/15/24 0523    senna-docusate (Pericolace Or Senokot S) 8.6-50 MG per tablet 2 Tablet  2 Tablet Enteral Tube BID Pedrito Gross M.D.   2 Tablet at 06/13/24 0524    And    polyethylene glycol/lytes (Miralax) Packet 1 Packet  1 Packet Enteral Tube QDAY PRN Pedrito Gross M.D.        And    magnesium hydroxide (Milk Of Magnesia) suspension 30 mL  30 mL Enteral Tube QDAY PRN Pedrito Gross M.D.        And    bisacodyl (Dulcolax) suppository 10 mg  10 mg Rectal QDAY PRN Pedrito Gross M.D.        lidocaine (Xylocaine) 1 % injection 2 mL  2 mL Tracheal Tube Q30 MIN PRN Pedrito Gross M.D.        Pharmacy Consult: Enteral tube insertion - review meds/change route/product selection  1 Each Other PHARMACY TO DOSE Pedrito Gross M.D.        HYDROmorphone (Dilaudid) injection 0.5-1 mg  0.5-1 mg Intravenous Q HOUR PRN Pedrito Gross M.D.   1 mg at 06/11/24 2108    midazolam (Versed) injection 1-5 mg  1-5 mg Intravenous Q HOUR PRN Pedrito Gross M.D.        ipratropium-albuterol (DUONEB) nebulizer solution  3 mL Nebulization Q2HRS PRN (RT) Pedrito Gross M.D.           Fluids    Intake/Output Summary (Last 24 hours) at 6/15/2024 0634  Last data filed at 6/15/2024 0600  Gross per 24 hour   Intake 1290.05 ml   Output 478 ml   Net 812.05 ml       Laboratory  Recent Labs     06/13/24  1928 06/14/24  1049 06/15/24  0016 06/15/24  0054   [id number]  --   --   --  7.48   [id number]  --   --   --  39.4*   [id number]  --   --   --  82.1   FNKE8QLE  --   --   --  95.3   ARTHCO3  --   --   --  29*   [id number]  --   --   --  N/A   ARTBE  --   --   --  5*   ISTATAPH 7.561* 7.551* 7.501*  --    ISTATAPCO2 37.2* 38.4* 44.6*  --    ISTATAPO2 94* 99* 344*  --    ISTATATCO2 35* 35* 36*  --    HEEQVVI5ODC 98 98 100*  --    ISTATARTHCO3 33.4* 33.6* 34.9*  --    ISTATARTBE 10* 10* 11*  --    ISTATTEMP 36.8 C 36.8 C 36.9 C  --    ISTATFIO2 28 30 100   --    ISTATSPEC Arterial Arterial Arterial  --    ISTATAPHTC 7.565* 7.554* 7.503*  --    [id number] 93* 98* 343*  --      Recent Labs     06/13/24 0506 06/14/24 0420   CPKTOTAL 21001* 4260*     Recent Labs     06/13/24  0506 06/13/24  1052 06/14/24  1740 06/14/24  2155 06/15/24  0054   SODIUM 132*   < > 135 135 136   POTASSIUM 3.6   < > 4.8 4.6 4.6   CHLORIDE 86*   < > 87* 87* 86*   CO2 28   < > 29 26 28   BUN 89*   < > >112* 111* 108*   CREATININE 6.27*   < > 9.25* 10.49* 10.14*   MAGNESIUM 3.5*   < > 4.2* 4.4* 4.3*   PHOSPHORUS 4.3  --   --  8.2* 9.1*   CALCIUM 6.8*   < > 7.6* 7.7* 7.8*    < > = values in this interval not displayed.     Recent Labs     06/13/24  0506 06/13/24  1052 06/14/24  0420 06/14/24  1047 06/14/24  1740 06/14/24  2155 06/15/24  0054   ALTSGPT 2282*  --  1427*  --   --   --  1050*   ASTSGOT 900*  --  445*  --   --   --  301*   ALKPHOSPHAT 114*  --  119*  --   --   --  197*   TBILIRUBIN 1.4  --  1.2  --   --   --  1.0   DBILIRUBIN  --   --   --   --   --  0.9* 0.8*   GAMMAGT  --   --   --   --   --  229* 247*   AMYLASE  --   --   --   --   --  215*  --    LIPASE  --   --   --   --   --   --  417*   GLUCOSE 125*   < > 102*   < > 112* 135* 128*    < > = values in this interval not displayed.     Recent Labs     06/13/24  0506 06/14/24  0420 06/15/24  0054   WBC 11.0* 10.9* 16.4*   NEUTSPOLYS 79.90* 77.70* 84.00*   LYMPHOCYTES 11.10* 10.60* 6.00*   MONOCYTES 7.10 8.90 7.90   EOSINOPHILS 0.80 2.00 0.80   BASOPHILS 0.20 0.20 0.20   ASTSGOT 900* 445* 301*   ALTSGPT 2282* 1427* 1050*   ALKPHOSPHAT 114* 119* 197*   TBILIRUBIN 1.4 1.2 1.0     Recent Labs     06/13/24  0506 06/14/24  0420 06/15/24  0054 06/15/24  0541   RBC 4.32* 3.83* 3.80*  --    HEMOGLOBIN 11.9* 10.2* 10.4*  --    HEMATOCRIT 32.2* 28.8* 29.4*  --    PLATELETCT 151* 113* 115*  --    PROTHROMBTM  --   --  13.4 13.5   APTT  --   --  35.1 33.7   INR  --   --  1.01 1.02       Imaging  ECHO:  CONCLUSIONS  Severely reduced left  ventricular systolic function.  The left ventricular ejection fraction is visually estimated to be less than 10-15%.  Unable to accurately estimate diastolic function.  The right ventricle is not well visualized, but appears dilated with severe reduced systolic function.  Dilated inferior vena cava without inspiratory collapse.    Assessment/Plan  * Cardiac arrest (HCC)- (present on admission)  Assessment & Plan  Found unresponsive in asystole  Suspected fentanyl overdose with UDS + for amphetamines, fentanyl, cannabinoids  No acute ischemic change on EKG, CTA negative for PE, head CT unremarkable  Continue postarrest care with normothermia protocol, maintain euvolemia, lung protective ventilation, eucapnia, euglycemia  ECHO with Bi-V failure and will need evaluation if a candidate     Goals of care, counseling/discussion  Assessment & Plan  6/12 - pt's mother and sister came from [place].  I gave them a detailed update of the patient's condition, prolonged downtime, concerns for severe non reversible brain injury due to hypoxia, and poor prognosis.  I explained that his exam was showing signs of brain death with lack of corneal/cough/gag reflexes and no w/d with painful stimuli.  Family demonstrated understanding and very emotional.  6/13 - pt's neuro exam unchanged.  I broached the subject of making the patient a DNR.  I consulted Palliative to assist with care.  6/14 - family conference with palliative care-->pt now DNAR and family would like to proceed with comfort care.  Pt is a registered donor and will keep patient as comfortable as possible during their evaluation    Non-traumatic rhabdomyolysis- (present on admission)  Assessment & Plan  ?due to prolonged downtime vs amphetamine use  Trend CPK-->improving   Stop Bicarbonate infusion    Shock (HCC)  Assessment & Plan  Undifferentiated shock post asystolic cardiac arrest has RESOLVED  MAP goals > 65,  Off vasopressors  ECHO with reduced biventricular  function  No evidence of PE, tamponade or acute ischemic change    Acute encephalopathy  Assessment & Plan  Suspicion for anoxic encephalopathy due to out of hospital cardiac arrest with unknown downtime  Toxic encephalopathy due to drug ingestion remains in differential  Continue supportive care, normothermia protocol  Initial head CT unremarkable  Stat EEG: no seizures  MRI brain consistent with severe anoxic brain injury  Due to worsening clinical exam, concern for progression to brain death.    Lactic acidosis  Assessment & Plan  Resolved   Secondary to cardiac arrest  S/p IVF resuscitation and bicarbonate  Trend lactic acid level    Transaminitis- (present on admission)  Assessment & Plan  Suspect ischemic hepatopathy due to cardiac arrest  Hepatitis panel was negative  Cont to improve    Acute kidney injury (HCC)  Assessment & Plan  Suspect component of ATN from cardiac arrest  Limit nephrotoxins  Monitor UOP/creat  May need nephrology consult, but suspect not an RRT candidate due to severe neurological injury from cardiac arrest.  Stopped bicarb infusion due to alkalemia on ABG.  Trial of lasix     Acute respiratory failure with hypoxia (HCC)  Assessment & Plan  Out of hospital cardiac arrest and intubated on 6/10/2024  Cont full ventilator support  RT/O2 protocols  Ventilator bundle protocols  I am actively adjusting ventilator settings based on ABGs/vent mechanics  S/p bronchoscopy with BAL on 6/10, empiric Unasyn x 5 days, all cultures negative  S/p bronchoscopy with BAL on 6/15  Diuresis as tolerated           VTE:  Heparin  Ulcer: H2 Antagonist  Lines: Central Line  Ongoing indication addressed, Arterial Line  Ongoing indication addressed, and Hood Catheter  Ongoing indication addressed    I have performed a physical exam and reviewed and updated ROS and Plan today (6/15/2024). In review of yesterday's note (6/14/2024), there are no changes except as documented above.     Discussed patient condition and  risk of morbidity and/or mortality with RN, RT, Pharmacy, Charge nurse / hot rounds, and palliative care    The patient remains critically ill.  I have assessed and reassessed the respiratory status and made ventilator adjustments based upon arterial blood gas analysis, ventilator waveforms and airway mechanics.  I have assessed and reassessed the blood pressure, hemodynamics, cardiovascular status. This patient remains at high risk for worsening cardiopulmonary dysfunction and death without the above critical care interventions.    Critical care time = 108 minutes in directly providing and coordinating critical care and extensive data review.  No time overlap and excludes procedures.

## 2024-06-15 NOTE — PROGRESS NOTES
4 Eyes Skin Assessment Completed by Linda Craig, RN and Romeo Jain RN.    Head WDL  Ears WDL  Nose Discoloration  Mouth Redness and Bleeding  Neck WDL  Breast/Chest WDL  Shoulder Blades WDL  Spine Open scab mid spine  (R) Arm/Elbow/Hand Edema  (L) Arm/Elbow/Hand Edema  Abdomen WDL  Groin WDL  Scrotum/Coccyx/Buttocks WDL  (R) Leg Edema  (L) Leg Edema  (R) Heel/Foot/Toe Redness, Blanching, and Boggy, calloused, peeling  (L) Heel/Foot/Toe  Redness, Blanching, and Boggy, calloused, peeling          Devices In Places ECG, Blood Pressure Cuff, Pulse Ox, Hood, Arterial Line, ET Tube, OG/NG, Central Line, and BMS      Interventions In Place Heel Mepilex, Sacral Mepilex, Heel Float Boots, Pillows, Elbow Mepilex, Q2 Turns, Low Air Loss Mattress, Barrier Cream, Heels Loaded W/Pillows, and Pressure Redistribution Mattress    Possible Skin Injury No    Pictures Uploaded Into Epic N/A  Wound Consult Placed N/A  RN Wound Prevention Protocol Ordered No

## 2024-06-15 NOTE — PROGRESS NOTES
12-hour chart check complete.    Monitor Summary  Rhythm: SR-ST  Rate: 90's-100's  Ectopy: ST  Measurements: .13/.07/.36

## 2024-06-15 NOTE — CONSULTS
Billing Purposes    Donor [facility] Referral:  # [id number]      Beginning of DCD Billing:      Donor [facility] assumes billing of this Donation after Circulatory Death (DCD) donor at the time of authorization on 06/14/2024 at 6:38 pm.     In Summary:    Donor [facility] Billing is from 06/14/2024 6:38 pm through organ recovery/OR.       If you have any questions/concerns regarding billing please contact Macrina Santillan, In-Hospital Donation Coordinator at [phone].    Thank you for your continued support of organ and tissue donation.

## 2024-06-15 NOTE — CARE PLAN
The patient is Watcher - Medium risk of patient condition declining or worsening    Shift Goals  Clinical Goals: CT scan, hemodynamic stability  Patient Goals: RAGINI  Family Goals: updates    Progress made toward(s) clinical / shift goals:    Problem: Hemodynamics  Goal: Patient's hemodynamics, fluid balance and neurologic status will be stable or improve  Outcome: Progressing  Note: Patient's hypertension is managed with ordered medications      Problem: Mechanical Ventilation  Goal: Safe management of artificial airway and ventilation  Outcome: Progressing     Problem: Pain - Standard  Goal: Alleviation of pain or a reduction in pain to the patient’s comfort goal  Outcome: Progressing  Note: Continuous drip of Dilaudid added to patient's MAR to control pain.        Patient is not progressing towards the following goals:

## 2024-06-15 NOTE — CARE PLAN
Problem: Ventilation  Goal: Ability to achieve and maintain unassisted ventilation or tolerate decreased levels of ventilator support  Description: Target End Date:  4 days     Document on Vent flowsheet    1.  Support and monitor invasive and noninvasive mechanical ventilation  2.  Monitor ventilator weaning response  3.  Perform ventilator associated pneumonia prevention interventions  4.  Manage ventilation therapy by monitoring diagnostic test results  Outcome: Progressing     Problem: Ventilation Defect:  Goal: Ability to achieve and maintain unassisted ventilation or tolerate decreased levels of ventilator support  Outcome: Progressing    Ventilator Daily Summary    Vent Day # 5   Airway: 8.0 @24    Ventilator settings: 26/400/8/30%   Weaning trials: no   Respiratory Procedures: no     Plan: Continue current ventilator settings and wean mechanical ventilation as tolerated per physician orders.

## 2024-06-16 NOTE — PROGRESS NOTES
Discussed with MD Pina patient's worsening lab values including potassium of 5.7, phosphorus 11.2, as well as peaked T waves on ECG and increasing CVP values of 16. Order received for sodium bicarbonate and calcium chloride.

## 2024-06-16 NOTE — PROGRESS NOTES
12-hour chart check complete.    Monitor Summary  Rhythm: SR  Rate: 80's-90's  Ectopy:   Measurements: .16/.08/.38

## 2024-06-16 NOTE — PROGRESS NOTES
Critical Care Progress Note    Date of admission  6/10/2024    Chief Complaint  39 y.o. male admitted 6/10/2024 with cardiac arrest    Hospital Course  Mr. Sands is a 39 year old male with no known past medical history who was found down by his brother on 6/10/2024 with unknown downtime.  The brother initiated CPR and EMS was activated.  When EMS arrived, they found the patient in asystole requiring 2 rounds of CPR with epinephrine before ROSC was achieved.  Unfortunately, the patient lost pulses again and arrived to the ER with CPR in progress receiving more epinephrine and bicarbonate for severe acidemia.  The patient's work up revealed a urine drug screen positive for amphetamines, cannabinoids, and fentanyl.  He was admitted to the ICU for ongoing care.  6/11 - VD#2, no gag/cough/corneal, decorticate posturing with sternal rub, worsening renal function, updated family of very poor prognosis  6/12 - VD#3, no gag/cough/corneal, no posturing or w/d with noxious stimuli, creat now at 5, mother and sister in town-->updated with concerns that patient may proceed to brain death  6/13 - VD#4, no gag/cough/corneal, no w/d to pain, creat at 6 with very little UOP, Palliative consulted, MRI ordered, updated family  6/14 - VD#5, no gag/cough/corneal, no w/d to pain, creat to 8, anuric, MRI brain with anoxic brain injury    Interval Problem Update  Reviewed last 24 hour events:    - no events overnight   - no change to neuro status   - Tmax 99.1   - SR 90s   - SBP 150s   - TFs at goal   - UOP of 12cc overnight, Hood   - BMS in place:  450cc overnight   - level 1 mobilty   - pt is 16 liters(+)   - VD#7   - CXR(reviewed): left base opacities, mild cephalization   - no SBT   - heparin   - Pepcid   - WBCs 14   - Hb 9.2   - platelets 124   - K 5.1   - creat 11.1   - AST//694   - CPK 1248   - BAL from 6/15: negative    Yesterday's Events:   - no events overnight   - family has chose comfort care, pt is a registered  donor   - no sedation: spontaneously swallowing, no w/d to painful stimuli, no cough/gag, ?corneal to right eye   - will start dilaudid infusion   - SR/ST 90-100s   - -160s   - Tmax 98.8   - TFs at goal   - UOP: 63 cc overnight, Hood   - BMS with 300cc overnight   - level 1 mobility   - pt is 16 liters (+)   - VD#6   - CXR(reviewed): left base opacities, mild cephalization   - no SBT   - heparin   - pepcid   - K 4.8   - creat 10   -    - ALT 1020   - Mg 4.5   - CPK 2352    Review of Systems  Review of Systems   Unable to perform ROS: Intubated        Vital Signs for last 24 hours   Temp:  [36.1 °C (97 °F)-37.3 °C (99.1 °F)] 37 °C (98.6 °F)  Pulse:  [79-98] 97  Resp:  [15-35] 15  BP: (124-161)/(73-90) 153/81  SpO2:  [97 %-100 %] 98 %    Hemodynamic parameters for last 24 hours  CVP:  [9 MM HG-17 MM HG] 16 MM HG    Respiratory Information for the last 24 hours  Vent Mode: APVCMV  Rate (breaths/min): 26  Vt Target (mL): 400  PEEP/CPAP: 8  MAP: 12  Control VTE (exp VT): 398    Physical Exam   Physical Exam  Vitals and nursing note reviewed.   Constitutional:       General: He is not in acute distress.     Appearance: He is ill-appearing and toxic-appearing.   HENT:      Head: Normocephalic and atraumatic.      Right Ear: External ear normal.      Left Ear: External ear normal.      Nose: Nose normal. No rhinorrhea.      Mouth/Throat:      Mouth: Mucous membranes are moist.      Comments: ETT in place  Eyes:      General: No scleral icterus.     Extraocular Movements:      Right eye: No nystagmus.      Left eye: No nystagmus.      Conjunctiva/sclera:      Right eye: Right conjunctiva is injected. Chemosis present.      Left eye: Left conjunctiva is injected. Chemosis present.      Pupils: Pupils are equal, round, and reactive to light.   Neck:      Comments: Left IJ TLC (6/10)  Cardiovascular:      Rate and Rhythm: Normal rate and regular rhythm.      Pulses: Normal pulses.      Heart sounds: No murmur  heard.  Pulmonary:      Breath sounds: No wheezing.      Comments: diminished throughout  Chest:      Chest wall: No tenderness.   Abdominal:      Palpations: Abdomen is soft.      Tenderness: There is no abdominal tenderness. There is no guarding or rebound.   Genitourinary:     Comments: Hood in place  Musculoskeletal:         General: Swelling present.      Cervical back: Normal range of motion and neck supple.      Right lower leg: Edema present.      Left lower leg: Edema present.      Comments: Diffuse swelling noted to all extremities   Lymphadenopathy:      Cervical: No cervical adenopathy.   Skin:     General: Skin is warm and dry.      Capillary Refill: Capillary refill takes less than 2 seconds.      Findings: No rash.   Neurological:      Comments: no cough/gag/=, ? Corneal to right eye, no posturing or withdrawal with sternal rub, spontaneously swallowing   Psychiatric:      Comments: Unable to assess         Medications  Current Facility-Administered Medications   Medication Dose Route Frequency Provider Last Rate Last Admin    HYDROmorphone (DILAUDID) 1 mg/mL in 50mL NS (HIGH ALERT - Non-Standard Continuous Infusion Concentration)   Intravenous Continuous Viri Pina M.D. 2 mL/hr at 06/16/24 0431 2 mg/hr at 06/16/24 0431    acetaminophen (Tylenol) tablet 650 mg  650 mg Enteral Tube Q6HRS PRN Viri Pina M.D.   650 mg at 06/12/24 1503    artificial tears (Eye Lubricant) ophth ointment 1 Application  1 Application Both Eyes Q8HRS Viri Pina M.D.   1 Application at 06/16/24 0600    propofol (DIPRIVAN) injection  0-80 mcg/kg/min (Ideal) Intravenous Continuous Viri Pina M.D.   Stopped at 06/14/24 0944    hydrALAZINE (Apresoline) injection 20 mg  20 mg Intravenous Q4HRS PRN Viri Pina M.D.        labetalol (Normodyne/Trandate) injection 10-20 mg  10-20 mg Intravenous Q2HRS PRN Viri Pina M.D.   10 mg at 06/15/24 0630    heparin injection 5,000 Units  5,000 Units  Subcutaneous Q8HRS Viri Pina M.D.   5,000 Units at 06/16/24 0538    carboxymethylcellulose (Refresh Tears) 0.5 % ophthalmic drops 1 Drop  1 Drop Both Eyes Q2HRS PRN Juju Ryan M.D.   1 Drop at 06/14/24 2228    Respiratory Therapy Consult   Nebulization Continuous RT Pedrito Gross M.D.        famotidine (Pepcid) tablet 20 mg  20 mg Enteral Tube Q24HRS Pedrito Gross M.D.   20 mg at 06/16/24 0539    senna-docusate (Pericolace Or Senokot S) 8.6-50 MG per tablet 2 Tablet  2 Tablet Enteral Tube BID Pedrito Gross M.D.   2 Tablet at 06/13/24 0524    And    polyethylene glycol/lytes (Miralax) Packet 1 Packet  1 Packet Enteral Tube QDAY PRN Pedrito Gross M.D.        And    magnesium hydroxide (Milk Of Magnesia) suspension 30 mL  30 mL Enteral Tube QDAY PRN Pedrito Gross M.D.        And    bisacodyl (Dulcolax) suppository 10 mg  10 mg Rectal QDAY PRN Pedrito Gross M.D.        lidocaine (Xylocaine) 1 % injection 2 mL  2 mL Tracheal Tube Q30 MIN PRN Pedrito Gross M.D.        Pharmacy Consult: Enteral tube insertion - review meds/change route/product selection  1 Each Other PHARMACY TO DOSE Pedrito Gross M.D.        HYDROmorphone (Dilaudid) injection 0.5-1 mg  0.5-1 mg Intravenous Q HOUR PRN Pedrito Gross M.D.   1 mg at 06/11/24 2108    midazolam (Versed) injection 1-5 mg  1-5 mg Intravenous Q HOUR PRN Pedrito Gross M.D.        ipratropium-albuterol (DUONEB) nebulizer solution  3 mL Nebulization Q2HRS PRN (RT) Pedrito Gross M.D.           Fluids    Intake/Output Summary (Last 24 hours) at 6/16/2024 0649  Last data filed at 6/16/2024 0600  Gross per 24 hour   Intake 1111.57 ml   Output 712 ml   Net 399.57 ml       Laboratory  Recent Labs     06/15/24  0054 06/15/24  1009 06/15/24  1551 06/15/24  1623 06/15/24  0553   WZUOD71K 7.48  --   --   --   --    ZOIXQS996I 39.4*  --   --   --   --    YMVHV457R 82.1  --   --   --   --    IQXY0BWX 95.3  --   --   --   --    ARTHCO3 29*  --   --   --   --     [id number] N/A  --   --   --   --    ARTBE 5*  --   --   --   --    ISTATAPH  --    < > 7.508* 7.506* 7.501*   ISTATAPCO2  --    < > 40.6* 42.1* 40.6*   ISTATAPO2  --    < > 98* 422* 404*   ISTATATCO2  --    < > 33 35* 33   [id number]  --    < > 98 100* 100*   ISTATARTHCO3  --    < > 32.2* 33.3* 31.7*   ISTATARTBE  --    < > 8* 9* 8*   ISTATTEMP  --    < > 36.1 C 36.1 C 37.1 C   ISTATFIO2  --    < > 30 100 100   ISTATSPEC  --    < > Arterial Arterial Arterial   ISTATAPHTC  --    < > 7.522* 7.520* 7.499   [id number]  --    < > 93* 417* 404*    < > = values in this interval not displayed.     Recent Labs     06/14/24  0420 06/15/24  0541   CPKTOTAL 4260* 2352*     Recent Labs     06/15/24  0541 06/15/24  1043 06/15/24  1339 06/15/24  1655 06/15/24  2313 06/16/24  0044   SODIUM 135   < >  --  136 137 137   POTASSIUM 4.8   < >  --  4.9 4.8 4.8   CHLORIDE 86*   < >  --  87* 87* 87*   CO2 28   < >  --  27 27 28   *   < >  --  116* 123* 126*   CREATININE 4.40*   < >  --  11.41* 12.01* 12.01*   MAGNESIUM 4.5*  --  4.5* 4.6*  --   --    PHOSPHORUS 9.8*  --  10.2* 10.5*  --   --    CALCIUM 8.0*   < >  --  8.0* 8.0* 8.1*    < > = values in this interval not displayed.     Recent Labs     06/14/24  2155 06/15/24  0054 06/15/24  0541 06/15/24  1043 06/15/24  1339 06/15/24  1655 06/15/24  2313 06/16/24  0044 06/16/24  0548   ALTSGPT  --  1050* 1020* 1007*  --  816*  --  719*  --    ASTSGOT  --  301* 286* 258*  --  220*  --  202*  --    ALKPHOSPHAT  --  197* 221* 206*  --  214*  --  241*  --    TBILIRUBIN  --  1.0 0.9 0.7  --  0.7  --  0.6  --    DBILIRUBIN 0.9* 0.8* 0.7*  --  0.5 0.5  --   --   --    GAMMAGT 229* 247* 269*  --  266*  --   --   --  320*   AMYLASE 215*  --   --   --   --   --   --   --   --    LIPASE  --  417*  --   --   --   --   --   --   --    GLUCOSE 135* 128* 132* 117*  --  126* 123* 110*  --      Recent Labs     06/15/24  1043 06/15/24  1339 06/15/24  1655 06/16/24 0044 06/16/24  0548   WBC  --     < > 10.7 12.5* 14.7*   NEUTSPOLYS  --    < > 78.80* 77.20* 80.60*   LYMPHOCYTES  --    < > 9.10* 10.70* 8.40*   MONOCYTES  --    < > 8.70 8.40 7.80   EOSINOPHILS  --    < > 2.50 2.70 2.20   BASOPHILS  --    < > 0.20 0.10 0.10   ASTSGOT 258*  --  220* 202*  --    ALTSGPT 1007*  --  816* 719*  --    ALKPHOSPHAT 206*  --  214* 241*  --    TBILIRUBIN 0.7  --  0.7 0.6  --     < > = values in this interval not displayed.     Recent Labs     06/15/24  1655 06/16/24  0044 06/16/24  0548   RBC 3.43* 3.40* 3.43*   HEMOGLOBIN 9.2* 9.1* 9.2*   HEMATOCRIT 26.4* 26.5* 26.5*   PLATELETCT 108* 118* 124*   PROTHROMBTM 13.5 13.2 13.1   APTT 33.0 35.3 29.6   INR 1.02 0.99 0.98       Imaging  ECHO:  CONCLUSIONS  Severely reduced left ventricular systolic function.  The left ventricular ejection fraction is visually estimated to be less than 10-15%.  Unable to accurately estimate diastolic function.  The right ventricle is not well visualized, but appears dilated with severe reduced systolic function.  Dilated inferior vena cava without inspiratory collapse.    Assessment/Plan  * Cardiac arrest (HCC)- (present on admission)  Assessment & Plan  Found unresponsive in asystole  Suspected fentanyl overdose with UDS + for amphetamines, fentanyl, cannabinoids  No acute ischemic change on EKG, CTA negative for PE, head CT unremarkable  Continue postarrest care with normothermia protocol, maintain euvolemia, lung protective ventilation, eucapnia, euglycemia  ECHO with Bi-V failure-->repeat ECHO today    Goals of care, counseling/discussion  Assessment & Plan  6/12 - pt's mother and sister came from [place].  I gave them a detailed update of the patient's condition, prolonged downtime, concerns for severe non reversible brain injury due to hypoxia, and poor prognosis.  I explained that his exam was showing signs of brain death with lack of corneal/cough/gag reflexes and no w/d with painful stimuli.  Family demonstrated understanding and very  emotional.  6/13 - pt's neuro exam unchanged.  I broached the subject of making the patient a DNR.  I consulted Palliative to assist with care.  6/14 - family conference with palliative care-->pt now DNAR and family would like to proceed with comfort care.  Pt is a registered donor and will keep patient as comfortable as possible during their evaluation    Non-traumatic rhabdomyolysis- (present on admission)  Assessment & Plan  ?due to prolonged downtime vs amphetamine use  Trend CPK-->improving   Stop Bicarbonate infusion    Shock (HCC)  Assessment & Plan  Undifferentiated shock post asystolic cardiac arrest has RESOLVED  MAP goals > 65,  Off vasopressors  ECHO with reduced biventricular function  No evidence of PE, tamponade or acute ischemic change    Acute encephalopathy  Assessment & Plan  Suspicion for anoxic encephalopathy due to out of hospital cardiac arrest with unknown downtime  Toxic encephalopathy due to drug ingestion remains in differential  Continue supportive care, normothermia protocol  Initial head CT unremarkable  Stat EEG: no seizures  MRI brain consistent with severe anoxic brain injury      Lactic acidosis  Assessment & Plan  Resolved   Secondary to cardiac arrest  S/p IVF resuscitation and bicarbonate  Trend lactic acid level    Transaminitis- (present on admission)  Assessment & Plan  Suspect ischemic hepatopathy due to cardiac arrest  Hepatitis panel was negative  Cont to improve    Acute kidney injury (HCC)  Assessment & Plan  Suspect component of ATN from cardiac arrest  Limit nephrotoxins  Monitor UOP/creat  May need nephrology consult, but suspect not an RRT candidate due to severe neurological injury from cardiac arrest.  Stopped bicarb infusion due to alkalemia on ABG.  No change to UOP/creat with lasix and albumin    Acute respiratory failure with hypoxia (HCC)  Assessment & Plan  Out of hospital cardiac arrest and intubated on 6/10/2024  Cont full ventilator support  RT/O2  protocols  Ventilator bundle protocols  I am actively adjusting ventilator settings based on ABGs/vent mechanics  S/p bronchoscopy with BAL on 6/10, empiric Unasyn x 5 days, all cultures negative  S/p bronchoscopy with BAL on 6/15: negative cultures  Diuresis as tolerated           VTE:  Heparin  Ulcer: H2 Antagonist  Lines: Central Line  Ongoing indication addressed, Arterial Line  Ongoing indication addressed, and Hood Catheter  Ongoing indication addressed    I have performed a physical exam and reviewed and updated ROS and Plan today (6/16/2024). In review of yesterday's note (6/15/2024), there are no changes except as documented above.     Discussed patient condition and risk of morbidity and/or mortality with RN, RT, Pharmacy, Charge nurse / hot rounds, and palliative care    The patient remains critically ill.  I have assessed and reassessed the respiratory status and made ventilator adjustments based upon arterial blood gas analysis, ventilator waveforms and airway mechanics.  I have assessed and reassessed the blood pressure, hemodynamics, cardiovascular status. This patient remains at high risk for worsening cardiopulmonary dysfunction and death without the above critical care interventions.    Critical care time = 105 minutes in directly providing and coordinating critical care and extensive data review.  No time overlap and excludes procedures.

## 2024-06-16 NOTE — CARE PLAN
Problem: Ventilation  Goal: Ability to achieve and maintain unassisted ventilation or tolerate decreased levels of ventilator support  Description: Target End Date:  4 days     Document on Vent flowsheet    1.  Support and monitor invasive and noninvasive mechanical ventilation  2.  Monitor ventilator weaning response  3.  Perform ventilator associated pneumonia prevention interventions  4.  Manage ventilation therapy by monitoring diagnostic test results  Outcome: Progressing     Problem: Ventilation Defect:  Goal: Ability to achieve and maintain unassisted ventilation or tolerate decreased levels of ventilator support  Outcome: Progressing    Ventilator Daily Summary    Vent Day # 6   Airway: 8.0 @24    Ventilator settings: 26/400/8/30%   Weaning trials: no   Respiratory Procedures: O2 challenges, per Donor team    Plan: Continue current ventilator settings and wean mechanical ventilation as tolerated per physician orders.

## 2024-06-16 NOTE — PROGRESS NOTES
4 Eyes Skin Assessment Completed by OMAYRA Up and OMAYRA Feliciano.    Head WDL  Ears WDL  Nose WDL  Mouth Redness and Discoloration, wound to tip of tongue  Neck WDL  Breast/Chest Scab  Shoulder Blades WDL  Spine midline abrason  (R) Arm/Elbow/Hand Redness, Blanching, and Edema  (L) Arm/Elbow/Hand Redness, Blanching, and Edema  Abdomen WDL  Groin WDL  Scrotum/Coccyx/Buttocks Redness and Blanching  (R) Leg Edema  (L) Leg Edema  (R) Heel/Foot/Toe Redness, Blanching, and Boggy, calloused, cracked   (L) Heel/Foot/Toe Redness, Blanching, and Boggy, calloused, cracked           Devices In Places ECG, Blood Pressure Cuff, Pulse Ox, Hood, Arterial Line, SCD's, ET Tube, Central Line, and BMS      Interventions In Place Heel Mepilex, Sacral Mepilex, Heel Float Boots, TAP System, Pillows, Elbow Mepilex, Q2 Turns, Low Air Loss Mattress, and Barrier Cream    Possible Skin Injury yes    Pictures Uploaded Into Epic Yes  Wound Consult Placed Yes  RN Wound Prevention Protocol Ordered Yes

## 2024-06-16 NOTE — CARE PLAN
The patient is Watcher - Medium risk of patient condition declining or worsening    Shift Goals  Clinical Goals: hemodynamic stability  Patient Goals: RAGINI  Family Goals: updates    Progress made toward(s) clinical / shift goals:  VSS, patient remained pain free.     Patient is not progressing towards the following goals:      Problem: Hemodynamics  Goal: Patient's hemodynamics, fluid balance and neurologic status will be stable or improve  Outcome: Progressing     Problem: Respiratory  Goal: Patient will achieve/maintain optimum respiratory ventilation and gas exchange  Outcome: Progressing     Problem: Rectal Tube  Goal: Fecal output will be contained and skin will remain free from irritation  Outcome: Progressing     Problem: Pain - Standard  Goal: Alleviation of pain or a reduction in pain to the patient’s comfort goal  Outcome: Progressing

## 2024-06-16 NOTE — PROGRESS NOTES
4 Eyes Skin Assessment Completed by OMAYRA Up and OMAYRA Malloy.    Head WDL  Ears WDL  Nose WDL  Mouth Redness and Bleeding  Neck WDL  Breast/Chest scabs  Shoulder Blades WDL  Spine midline scab  (R) Arm/Elbow/Hand Redness, Blanching, and Edema  (L) Arm/Elbow/Hand Redness, Blanching, and Edema  Abdomen WDL  Groin WDL  Scrotum/Coccyx/Buttocks Redness and Blanching  (R) Leg Edema  (L) Leg Edema  (R) Heel/Foot/Toe Redness, Blanching, and Boggy, calloused, cracked  (L) Heel/Foot/Toe Redness, Blanching, and Boggy, calloused, cracked           Devices In Places ECG, Blood Pressure Cuff, Pulse Ox, Hood, Arterial Line, SCD's, ET Tube, Central Line, and BMS      Interventions In Place Heel Mepilex, Sacral Mepilex, Heel Float Boots, TAP System, Pillows, Elbow Mepilex, Q2 Turns, Low Air Loss Mattress, and Barrier Cream    Possible Skin Injury Yes    Pictures Uploaded Into Epic Yes  Wound Consult Placed Yes  RN Wound Prevention Protocol Ordered Yes

## 2024-06-16 NOTE — PROGRESS NOTES
(Late entry)   Notified by radiology that patient's ET tube is 8.2cm above shantelle, recommendation received to advance ET tube. MD Pina and RT notified, orders received to advance ET tube, to be completed by RT.

## 2024-06-16 NOTE — PROGRESS NOTES
Patients phos continuing to trend up and remain critical. Notified MD, no new orders at this time.

## 2024-06-16 NOTE — PROGRESS NOTES
4 Eyes Skin Assessment Completed by Linda Craig, RN and Romeo Jain RN.    Head WDL  Ears WDL  Nose WDL  Mouth Redness  Neck WDL  Breast/Chest Scab  Shoulder Blades WDL  Spine Scab  (R) Arm/Elbow/Hand Edema  (L) Arm/Elbow/Hand Edema  Abdomen WDL  Groin WDL  Scrotum/Coccyx/Buttocks WDL  (R) Leg Edema  (L) Leg Edema  (R) Heel/Foot/Toe Redness and Blanching, peeling  (L) Heel/Foot/Toe Redness and Blanching, peeling          Devices In Places ECG, Blood Pressure Cuff, Pulse Ox, Hood, Arterial Line, ET Tube, OG/NG, Central Line, and BMS      Interventions In Place Heel Mepilex, Sacral Mepilex, Heel Float Boots, TAP System, Pillows, Elbow Mepilex, Q2 Turns, Low Air Loss Mattress, Barrier Cream, and Pressure Redistribution Mattress    Possible Skin Injury No    Pictures Uploaded Into Epic N/A  Wound Consult Placed N/A  RN Wound Prevention Protocol Ordered already ordered

## 2024-06-17 NOTE — PROGRESS NOTES
Lab called with critical result of Phos 11.8 at 0701. Critical lab result read back to Lab.   Dr. Frey notified of critical lab result at 0703.  Critical lab result read back by Dr. frey.

## 2024-06-17 NOTE — CARE PLAN
Donor  Problem: Ventilation  Goal: Ability to achieve and maintain unassisted ventilation or tolerate decreased levels of ventilator support  Description: Target End Date:  4 days     Document on Vent flowsheet    1.  Support and monitor invasive and noninvasive mechanical ventilation  2.  Monitor ventilator weaning response  3.  Perform ventilator associated pneumonia prevention interventions  4.  Manage ventilation therapy by monitoring diagnostic test results  Outcome: Not Progressing     Problem: Ventilation Defect:  Goal: Ability to achieve and maintain unassisted ventilation or tolerate decreased levels of ventilator support  Outcome: Not Progressing

## 2024-06-17 NOTE — PROGRESS NOTES
[facility] Progress Note      Admit Date: 6/10/2024    Resident(s): Virginie Mayfield M.D.   Attending:  ARACELIS FITZGERALD/ Dr. Cruz    Patient ID:    Name:  Luis Alfredo Mora     YOB: 1985  Age:  39 y.o.  male   MRN:  [id number]    Hospital Course (carried forward and updated):  Mr. Sands is a 39 year old male with no known past medical history who was found down by his brother on 6/10/2024 with unknown downtime.  The brother initiated CPR and EMS was activated.  When EMS arrived, they found the patient in asystole requiring 2 rounds of CPR with epinephrine before ROSC was achieved.  Unfortunately, the patient lost pulses again and arrived to the ER with CPR in progress receiving more epinephrine and bicarbonate for severe acidemia.  The patient's work up revealed a urine drug screen positive for amphetamines, cannabinoids, and fentanyl.  He was admitted to the ICU for ongoing care.  6/11 - VD#2, no gag/cough/corneal, decorticate posturing with sternal rub, worsening renal function, updated family of very poor prognosis  6/12 - VD#3, no gag/cough/corneal, no posturing or w/d with noxious stimuli, creat now at 5, mother and sister in town-->updated with concerns that patient may proceed to brain death  6/13 - VD#4, no gag/cough/corneal, no w/d to pain, creat at 6 with very little UOP, Palliative consulted, MRI ordered, updated family  6/14 - VD#5, no gag/cough/corneal, no w/d to pain, creat to 8, anuric, MRI brain with anoxic brain injury  6/16-BAL performed for hypoxemia and plugging, unasyn resumed, hyperK shifting therapy and lasix challenge w minimal UOP    Consultants:  Critical Care  Nephrology  Donor Network  Palliative  Cardiology    Interval Events:  no acute events overnight. Patient has uptrending K with volume overload. hyperK protocol initiated with placement of central line for anticipated dialysis. Pending organ removal for donation.    Vitals Range last 24h:  Temp:  [36.6 °C (97.9 °F)-37.4 °C  (99.3 °F)] 37.2 °C (99 °F)  Pulse:  [] 118  Resp:  [3-33] 25  BP: (110-167)/(56-98) 155/93  SpO2:  [94 %-100 %] 97 %      Intake/Output Summary (Last 24 hours) at 6/17/2024 1256  Last data filed at 6/17/2024 0600  Gross per 24 hour   Intake 1724.31 ml   Output 590 ml   Net 1134.31 ml        Review of Systems   Unable to perform ROS: Acuity of condition       PHYSICAL EXAM:  Vitals:    06/17/24 0952 06/17/24 1200 06/17/24 1215 06/17/24 1230   BP:  (!) 149/90 (!) 165/98 (!) 155/93   Pulse: (!) 102 (!) 120 (!) 119 (!) 118   Resp: (!) 26 (!) 32 (!) 33 (!) 25   Temp:  36.6 °C (97.9 °F)  37.2 °C (99 °F)   TempSrc:  Esophageal  Bladder   SpO2: 97%      Weight:       Height:        Body mass index is 27.49 kg/m².    O2 therapy: Pulse Oximetry: 97 %, O2 Delivery Device: Ventilator         Physical Exam  Constitutional:       Appearance: He is ill-appearing.   HENT:      Head: Normocephalic and atraumatic.      Right Ear: Tympanic membrane, ear canal and external ear normal.      Left Ear: Tympanic membrane, ear canal and external ear normal.      Nose: Nose normal.      Mouth/Throat:      Pharynx: Oropharynx is clear.   Eyes:      Extraocular Movements: Extraocular movements intact.      Conjunctiva/sclera: Conjunctivae normal.      Pupils: Pupils are equal, round, and reactive to light.   Cardiovascular:      Rate and Rhythm: Tachycardia present.      Pulses: Normal pulses.   Pulmonary:      Effort: Pulmonary effort is normal.   Abdominal:      General: Abdomen is flat.   Musculoskeletal:         General: Swelling present.      Cervical back: Normal range of motion.      Right lower leg: Edema present.      Left lower leg: Edema present.   Neurological:      Mental Status: He is unresponsive.      GCS: GCS eye subscore is 1. GCS verbal subscore is 1. GCS motor subscore is 1.         Recent Labs     06/15/24  0054 06/15/24  1009 06/16/24  1842 06/16/24  2304 06/17/24  0151 06/17/24  0500   FORWD92F 7.48  --   --   --   7.48  --    RUIJUE807G 39.4*  --   --   --  39.2*  --    YEBUT863G 82.1  --   --   --  339.1*  --    VGYN6MEG 95.3  --   --   --  99.2*  --    ARTHCO3 29*  --   --   --  29*  --    [id number] N/A  --   --   --  100  --    ARTBE 5*  --   --   --  5*  --    ISTATAPH  --    < > 7.552* 7.440  --  7.505*   ISTATAPCO2  --    < > 37.2* 47.8*  --  42.2*   ISTATAPO2  --    < > 79 132*  --  84   ISTATATCO2  --    < > 34* 34*  --  35*   DVIGCQE6CYL  --    < > 97 99  --  97   ISTATARTHCO3  --    < > 32.7* 32.5*  --  33.3*   ISTATARTBE  --    < > 10* 7*  --  9*   ISTATTEMP  --    < > 37.3 C 37.4 C  --  36.9 C   ISTATFIO2  --    < > 40 40  --  30   ISTATSPEC  --    < > Arterial Arterial  --  Arterial   ISTATAPHTC  --    < > 7.548* 7.434  --  7.507*   MXBECPAE6AU  --    < > 81 135*  --  83    < > = values in this interval not displayed.     Recent Labs     06/16/24  1813 06/17/24  0015 06/17/24  0608   SODIUM 134* 136 134*   POTASSIUM 5.8* 6.2* 6.4*   CHLORIDE 86* 87* 85*   CO2 27 27 27   * 151* 165*   CREATININE 12.87* 13.37* 13.74*   MAGNESIUM 4.8* 4.8* 4.7*   PHOSPHORUS 10.7* 12.5* 11.8*   CALCIUM 8.8 9.0 8.9     Recent Labs     06/14/24  2155 06/15/24  0054 06/15/24  0541 06/16/24  1813 06/17/24  0015 06/17/24  0608   ALTSGPT  --  1050*   < > 576* 485* 447*   ASTSGOT  --  301*   < > 159* 138* 126*   ALKPHOSPHAT  --  197*   < > 299* 325* 296*   TBILIRUBIN  --  1.0   < > 0.5 0.5 0.4   DBILIRUBIN 0.9* 0.8*   < > 0.4 0.3 0.3   GAMMAGT 229* 247*   < > 341* 337* 329*   AMYLASE 215*  --   --   --   --   --    LIPASE  --  417*  --   --   --   --    GLUCOSE 135* 128*   < > 112* 116* 107*    < > = values in this interval not displayed.     Recent Labs     06/16/24  1813 06/17/24  0015 06/17/24  0608   RBC 3.24* 3.15* 3.01*   HEMOGLOBIN 8.7* 8.5* 8.1*   HEMATOCRIT 25.1* 24.1* 23.4*   PLATELETCT 140* 142* 144*   PROTHROMBTM 13.6 13.7 13.6   APTT 39.5* 40.0* 35.3   INR 1.03 1.04 1.03     Recent Labs     06/16/24  1813 06/17/24  0015  06/17/24  0608   WBC 14.5* 17.1* 16.1*   NEUTSPOLYS 79.60* 84.90* 79.80*   LYMPHOCYTES 8.20* 4.20* 7.70*   MONOCYTES 8.70 8.10 9.20   EOSINOPHILS 2.50 1.80 2.20   BASOPHILS 0.20 0.20 0.20   ASTSGOT 159* 138* 126*   ALTSGPT 576* 485* 447*   ALKPHOSPHAT 299* 325* 296*   TBILIRUBIN 0.5 0.5 0.4       Meds:   furosemide  120 mg      heparin  500 Units      heparin  1,500 Units      heparin  1,200 Units      heparin  1,200 Units      Respiratory Therapy Consult        ampicillin-sulbactam (UNASYN) IV  3 g Stopped (06/16/24 2307)    HYDROmorphone   3 mg/hr (06/17/24 0655)    acetaminophen  650 mg      artificial tears  1 Application      hydrALAZINE  20 mg      labetalol  10-20 mg      heparin  5,000 Units      carboxymethylcellulose  1 Drop      famotidine  20 mg      senna-docusate  2 Tablet      And    polyethylene glycol/lytes  1 Packet      And    magnesium hydroxide  30 mL      And    bisacodyl  10 mg      lidocaine  2 mL      Pharmacy  1 Each      HYDROmorphone  0.5-1 mg      midazolam  1-5 mg      ipratropium-albuterol  3 mL          Procedures:  Central line  BAL  EEG  Arterial line    Imaging:  DX-CHEST-PORTABLE (1 VIEW)   Final Result      New right IJ central venous catheter extending into the superior vena cava. There is no pneumothorax.      DX-CHEST-LIMITED (1 VIEW)   Final Result         1.  Pulmonary edema and/or infiltrates, decreased in the left upper lobe since prior study.   2.  Small left pleural effusion, stable since prior study.      DX-CHEST-LIMITED (1 VIEW)   Final Result         1.  Pulmonary edema and/or infiltrates, increased in the left upper lobe since prior study.   2.  Small left pleural effusion, increased since prior study.      EC-ECHOCARDIOGRAM COMPLETE W/ CONT   Final Result      DX-CHEST-LIMITED (1 VIEW)   Final Result         Endotracheal tube position is high. It is 8.2 cm above shantelle. Recommend advancement.      Critical value called by Dr. Agus Ramachandran to nurse pU at  6/16/2024 1:57 PM.      DX-CHEST-LIMITED (1 VIEW)   Final Result      1.  All lines and tubes appear appropriately located      2.  Minimal left basilar atelectasis      DX-CHEST-LIMITED (1 VIEW)   Final Result      1.  Perihilar and basilar opacities are unchanged. This could be pulmonary edema or pneumonia.  Findings in the LEFT lower lobe in particular are suspicious for pneumonia   2.  Suspect small LEFT pleural effusion.      DX-CHEST-LIMITED (1 VIEW)   Final Result      Perihilar and basilar opacities are increased. This could be pulmonary edema or pneumonia.      DX-CHEST-LIMITED (1 VIEW)   Final Result      1.  No acute cardiopulmonary abnormality identified.      2.  Left internal jugular catheter and enteric catheter appear appropriately located      CT-CHEST,ABDOMEN,PELVIS W/O   Final Result      1.  Bilateral lower lung pulmonary opacity and volume loss suggesting consolidation and atelectasis.   2.  Small pleural effusion.   3.  Trace ascites.   4.  Anasarca.   5.  Rectal tube. Perirectal fat stranding and edema.   6.  Curvilinear density in the right lower quadrant pain. Question appendix with inspissated material. This is not dilated.      EC-ECHOCARDIOGRAM COMPLETE W/ CONT   Final Result      DX-CHEST-LIMITED (1 VIEW)   Final Result      No significant change      DX-CHEST-LIMITED (1 VIEW)   Final Result      1.  Unchanged central and LEFT greater than RIGHT basilar pulmonary opacities likely a combination of atelectasis and edema or pneumonia   2.  Slight interval retraction of endotracheal tube      DX-CHEST-LIMITED (1 VIEW)   Final Result      1.  Increased central and LEFT greater than RIGHT basilar pulmonary opacities likely a combination of atelectasis and edema or pneumonia   2.  Slight interval retraction of endotracheal tube      MR-BRAIN-W/O   Final Result         The diffusion-weighted sequences demonstrates diffuse area of restricted diffusion in the supratentorial gray matter. There are  also areas of restricted diffusion in the bilateral basal ganglia. These findings are consistent with diffuse anoxic brain    injury.      DX-CHEST-PORTABLE (1 VIEW)   Final Result         1.  No acute cardiopulmonary disease.      DX-CHEST-PORTABLE (1 VIEW)   Final Result         1.  No acute cardiopulmonary disease.   2.  Endotracheal tube terminates at T3, could be advanced 1 to 2 cm.      DX-CHEST-PORTABLE (1 VIEW)   Final Result         1.  No acute cardiopulmonary disease.      EC-ECHOCARDIOGRAM COMPLETE W/ CONT   Final Result      DX-CHEST-PORTABLE (1 VIEW)   Final Result      1.  Interval placement of left internal jugular central venous catheter.   2.  Interval placement of enteric sump tube.   3.  No evidence of procedure-related pneumothorax.      DX-ABDOMEN FOR TUBE PLACEMENT   Final Result      Enteric tube tip projects over the upper stomach      CT-CTA CHEST PULMONARY ARTERY W/ RECONS   Final Result      1.  No central or segmental pulmonary embolus   2.  BILATERAL dependent airspace disease airway fluid suspicious for aspiration pneumonia   3.  Hepatic steatosis   4.  Trace LEFT anterior chest wall gas of uncertain etiology            CT-HEAD W/O   Final Result      No acute intracranial abnormality.            DX-CHEST-PORTABLE (1 VIEW)   Final Result         1.  No acute cardiopulmonary disease.      DX-CHEST-LIMITED (1 VIEW)    (Results Pending)   DX-CHEST-LIMITED (1 VIEW)    (Results Pending)   EC-ECHOCARDIOGRAM COMPLETE W/O CONT    (Results Pending)       ASSESSEMENT and PLAN:    * Cardiac arrest (HCC)- (present on admission)  Assessment & Plan  Found unresponsive in asystole  Suspected fentanyl overdose with UDS + for amphetamines, fentanyl, cannabinoids     No acute ischemic change on EKG, CTA negative for PE, head CT unremarkable  Continue postarrest care with normothermia protocol, maintain euvolemia, lung protective ventilation, eucapnia, euglycemia  LV function improving       Goals of  care, counseling/discussion  Assessment & Plan  6/12 - pt's mother and sister came from [place].  I gave them a detailed update of the patient's condition, prolonged downtime, concerns for severe non reversible brain injury due to hypoxia, and poor prognosis.  I explained that his exam was showing signs of brain death with lack of corneal/cough/gag reflexes and no w/d with painful stimuli.  Family demonstrated understanding and very emotional.  6/13 - pt's neuro exam unchanged.  I broached the subject of making the patient a DNR.  I consulted Palliative to assist with care.  6/14 - family conference with palliative care-->pt now DNAR and family would like to proceed with comfort care.  Pt is a registered donor and will keep patient as comfortable as possible during their evaluation    6/17 - anticipate organ harvesting tomorrow in accordance with patient's donor wishes       Acute kidney injury (HCC)  Assessment & Plan  Ishemic ATN with possible pigment nephropathy with rhabdo  Increasingly oliguric, now with renal failure as evidenced by hyperK and metabolic derangments and severe VOL  No change to UOP/creat with lasix and albumin     Dialysis catheter placed with emergent dialysis 6/17     Limit nephrotoxins  Monitor UOP/creat       Polysubstance use disorder  Assessment & Plan  UDS positive for amphetamines, cannabinoids, fentanyl.     Non-traumatic rhabdomyolysis- (present on admission)  Assessment & Plan  Likely due to prolonged downtime      Trend CPK-->improving   S/P Bicarb infusion     Now with dense GAVIN/ATN     Shock (HCC)  Assessment & Plan  Undifferentiated shock post asystolic cardiac arrest   Initial TTE with depressed BiV function, now improved  Off vasoactives     Continue MAP goals > 65    Acute encephalopathy  Assessment & Plan  Suspicion for anoxic encephalopathy due to out of hospital cardiac arrest with unknown downtime  Toxic encephalopathy due to drug ingestion remains in  differential  Continue supportive care, normothermia protocol  Initial head CT unremarkable  Stat EEG: no seizures  MRI brain consistent with severe anoxic brain injury       Lactic acidosis  Assessment & Plan  Resolved   Secondary to cardiac arrest  S/p IVF resuscitation and bicarbonate  Trend lactic acid level       Transaminitis- (present on admission)  Assessment & Plan  Suspect ischemic hepatopathy due to cardiac arrest  Stable  Hepatitis panel was negative  No coagulopathy, synthetic function appears preserved    Acute respiratory failure with hypoxia (HCC)  Assessment & Plan  Out of hospital cardiac arrest and intubated on 6/10/2024  Cont full ventilator support  RT/O2 protocols  Ventilator bundle protocols  I am actively adjusting ventilator settings based on ABGs/vent mechanics  S/p bronchoscopy with BAL on 6/10, empiric Unasyn x 5 days, all cultures negative  S/p bronchoscopy with BAL on 6/15: negative cultures  6/16- Repeat BAL for plugging and hypoxemia, dense secretions clears LLL, restarted unasyn     Not responding to lasix          Virginie Mayfield M.D.

## 2024-06-17 NOTE — PROCEDURES
Central Line Insertion    Date/Time: 6/17/2024 11:01 AM    Performed by: Immanuel Cruz M.D.  Authorized by: Immanuel Cruz M.D.    Consent:     Consent obtained:  Verbal    Consent given by:  Parent    Risks discussed:  Arterial puncture, incorrect placement, nerve damage, infection and pneumothorax    Alternatives discussed:  No treatment  Pre-procedure details:     Hand hygiene: Hand hygiene performed prior to insertion      Sterile barrier technique: All elements of maximal sterile technique followed      Skin preparation:  2% chlorhexidine    Skin preparation agent: Skin preparation agent completely dried prior to procedure    Anesthesia:     Anesthesia method:  Local infiltration    Local anesthetic:  Lidocaine 1% w/o epi  Procedure details:     Location:  R internal jugular    Patient position:  Flat    Procedural supplies:  Triple lumen    Catheter size: 13fr.    Ultrasound guidance: yes      Sterile ultrasound techniques: Sterile gel and sterile probe covers were used      Number of attempts:  1    Successful placement: yes    Post-procedure details:     Post-procedure:  Line sutured and dressing applied    Guidewire: guidewire removal confirmed      Assessment:  Blood return through all ports and no pneumothorax on x-ray    Patient tolerance of procedure:  Tolerated well, no immediate complications

## 2024-06-17 NOTE — PROGRESS NOTES
4 Eyes Skin Assessment Completed by OMAYRA Walters and OMAYRA Ackerman.    Head WDL  Ears WDL  Nose WDL  Mouth Redness and Discoloration, wound on tongue  Neck WDL  Breast/Chest Scab  Shoulder Blades WDL  Spine scab/abrasion  (R) Arm/Elbow/Hand Redness, Blanching, and Edema  (L) Arm/Elbow/Hand Redness, Blanching, and Edema  Abdomen WDL  Groin WDL  Scrotum/Coccyx/Buttocks Redness and Blanching  (R) Leg Edema  (L) Leg Edema  (R) Heel/Foot/Toe Redness, Blanching, and Boggy, peeling calloused heels  (L) Heel/Foot/Toe Redness, Blanching, and Boggy, peeling calloused heels          Devices In Places ECG, Blood Pressure Cuff, Pulse Ox, Hood, Arterial Line, SCD's, ET Tube, and Central Line, BMS      Interventions In Place Heel Mepilex, Sacral Mepilex, Heel Float Boots, TAP System, Pillows, Q2 Turns, and Low Air Loss Mattress    Possible Skin Injury No    Pictures Uploaded Into Epic N/A  Wound Consult Placed N/A  RN Wound Prevention Protocol Ordered No

## 2024-06-17 NOTE — ASSESSMENT & PLAN NOTE
Ishemic ATN with possible pigment nephropathy with rhabdo  Increasingly oliguric, now with renal failure as evidenced by hyperK and metabolic derangments and severe VOL  No change to UOP/creat with lasix and albumin     Dialysis catheter placed with emergent dialysis 6/17. HD again 6/18.     Limit nephrotoxins  Monitor UOP/creat

## 2024-06-17 NOTE — PROGRESS NOTES
Critical Care Progress Note    Date of admission  6/10/2024    Chief Complaint  39 y.o. male admitted 6/10/2024 with cardiac arrest     Hospital Course  Mr. Sands is a 39 year old male with no known past medical history who was found down by his brother on 6/10/2024 with unknown downtime.  The brother initiated CPR and EMS was activated.  When EMS arrived, they found the patient in asystole requiring 2 rounds of CPR with epinephrine before ROSC was achieved.  Unfortunately, the patient lost pulses again and arrived to the ER with CPR in progress receiving more epinephrine and bicarbonate for severe acidemia.  The patient's work up revealed a urine drug screen positive for amphetamines, cannabinoids, and fentanyl.  He was admitted to the ICU for ongoing care.  6/11 - VD#2, no gag/cough/corneal, decorticate posturing with sternal rub, worsening renal function, updated family of very poor prognosis  6/12 - VD#3, no gag/cough/corneal, no posturing or w/d with noxious stimuli, creat now at 5, mother and sister in town-->updated with concerns that patient may proceed to brain death  6/13 - VD#4, no gag/cough/corneal, no w/d to pain, creat at 6 with very little UOP, Palliative consulted, MRI ordered, updated family  6/14 - VD#5, no gag/cough/corneal, no w/d to pain, creat to 8, anuric, MRI brain with anoxic brain injury  6/16-BAL performed for hypoxemia and plugging, unasyn resumed, hyperK shifting therapy and lasix challenge w minimal UOP  6/17-No neuro changes, HD initiated for critical hyperK and VOL    Interval Problem Update  Reviewed last 24 hour events:  BAL done yesterday for hypoxemia, thick secretion cleared and started on unasyn  HyperK overnight, received shifting therapy, still no UOP, worsen this AM    Neuro:     CV:  HR 90s  -170  [id number]    Resp:   APV-CMV  25/400/8/0.4  7.5/42/84/97%    CXR bilateral infiltrates, left effusion     GI: BMS 400cc    I/O: +1450 (+18L)  UOP: 60    Tmax: AF  Heme: WBC  17 (14)    Abx:   (6/10-6/14) Unasyn empiric post arrest  (6/16- ) Unasyn resumed (presumed PNA)    Micro:  6/16- BAL BGTD  Prior Cx data without growth  MRSA PCR neg  RVP neg    Endo: BG WTR (ISS), Insulin needed the last 2 days    LDA: PIVs, RRAL, LIJ CVC, ETT, Rectal tube, NGT  SUP: H2RA  VTE: SQH  Diet: EN     Review of Systems  Review of Systems   Unable to perform ROS: Critical illness        Vital Signs for last 24 hours   Temp:  [36.9 °C (98.4 °F)-37.4 °C (99.3 °F)] 36.9 °C (98.4 °F)  Pulse:  [] 102  Resp:  [3-27] 26  BP: (110-167)/(56-87) 113/56  SpO2:  [94 %-100 %] 97 %    Hemodynamic parameters for last 24 hours  CVP:  [9 MM HG-17 MM HG] 10 MM HG    Respiratory Information for the last 24 hours  Vent Mode: APVCMV  Rate (breaths/min): 26  Vt Target (mL): 400  PEEP/CPAP: 8  MAP: 12  Control VTE (exp VT): 414    Physical Exam   Physical Exam  Vitals and nursing note reviewed.   Constitutional:       General: He is not in acute distress.     Appearance: He is ill-appearing and toxic-appearing.   HENT:      Head: Normocephalic and atraumatic.      Right Ear: External ear normal.      Left Ear: External ear normal.      Nose: Nose normal. No rhinorrhea.      Mouth/Throat:      Mouth: Mucous membranes are moist.      Comments: ETT in place  Eyes:      General: No scleral icterus.     Extraocular Movements:      Right eye: No nystagmus.      Left eye: No nystagmus.      Conjunctiva/sclera:      Right eye: Right conjunctiva is injected. Chemosis present.      Left eye: Left conjunctiva is injected. Chemosis present.      Pupils: Pupils are equal, round, and reactive to light.   Neck:      Comments: Left IJ TLC (6/10)  Cardiovascular:      Rate and Rhythm: Normal rate and regular rhythm.      Pulses: Normal pulses.      Heart sounds: No murmur heard.  Pulmonary:      Breath sounds: No wheezing.      Comments: diminished throughout  Chest:      Chest wall: No tenderness.   Abdominal:      Palpations: Abdomen  is soft.      Tenderness: There is no abdominal tenderness. There is no guarding or rebound.   Genitourinary:     Comments: Hood in place  Musculoskeletal:         General: Swelling present.      Cervical back: Normal range of motion and neck supple.      Right lower leg: Edema present.      Left lower leg: Edema present.      Comments: Diffuse swelling noted to all extremities   Lymphadenopathy:      Cervical: No cervical adenopathy.   Skin:     General: Skin is warm and dry.      Capillary Refill: Capillary refill takes less than 2 seconds.      Findings: No rash.   Neurological:      Comments: No cough/gag  ?Corneal to right eye, subtle  Pupils non reative    No posturing or withdrawal with sternal rub  Triggers on PSV   Psychiatric:      Comments: Unable to assess         Medications  Current Facility-Administered Medications   Medication Dose Route Frequency Provider Last Rate Last Admin    furosemide (Lasix) injection 120 mg  120 mg Intravenous Q DAY Immanuel Cruz M.D.   120 mg at 06/17/24 0818    heparin injection 500 Units  500 Units Intravenous DIALYSIS PRN Fadi Najjar, M.D.   500 Units at 06/17/24 1005    heparin injection 1,500 Units  1,500 Units Intravenous DIALYSIS PRN Fadi Najjar, M.D.   1,500 Units at 06/17/24 1005    heparin intracatheter (for DIALYSIS USE ONLY) 1,200 Units  1,200 Units Intracatheter DIALYSIS PRN Fadi Najjar, M.D.        heparin intracatheter (for DIALYSIS USE ONLY) 1,200 Units  1,200 Units Intracatheter DIALYSIS PRN Fadi Najjar, M.D.        Respiratory Therapy Consult   Nebulization Continuous RT Viri Pina M.D.        ampicillin/sulbactam (Unasyn) 3 g in  mL IVPB  3 g Intravenous Q EVENING Geoff Perry D.O.   Stopped at 06/16/24 2307    HYDROmorphone (DILAUDID) 1 mg/mL in 50mL NS (HIGH ALERT - Non-Standard Continuous Infusion Concentration)   Intravenous Continuous Viri Pina M.D. 3 mL/hr at 06/17/24 0655 3 mg/hr at 06/17/24 0655    acetaminophen (Tylenol)  tablet 650 mg  650 mg Enteral Tube Q6HRS PRN Viri Pina M.D.   650 mg at 06/12/24 1503    artificial tears (Eye Lubricant) ophth ointment 1 Application  1 Application Both Eyes Q8HRS Viri Pina M.D.   1 Application at 06/17/24 0600    hydrALAZINE (Apresoline) injection 20 mg  20 mg Intravenous Q4HRS PRN Viri Pina M.D.        labetalol (Normodyne/Trandate) injection 10-20 mg  10-20 mg Intravenous Q2HRS PRN Viri Pina M.D.   20 mg at 06/16/24 2249    heparin injection 5,000 Units  5,000 Units Subcutaneous Q8HRS Viri Pina M.D.   5,000 Units at 06/17/24 0506    carboxymethylcellulose (Refresh Tears) 0.5 % ophthalmic drops 1 Drop  1 Drop Both Eyes Q2HRS PRN Juju Ryan M.D.   1 Drop at 06/14/24 2228    famotidine (Pepcid) tablet 20 mg  20 mg Enteral Tube Q24HRS Pedrito Gross M.D.   20 mg at 06/17/24 0506    senna-docusate (Pericolace Or Senokot S) 8.6-50 MG per tablet 2 Tablet  2 Tablet Enteral Tube BID Pedrito Gross M.D.   2 Tablet at 06/13/24 0524    And    polyethylene glycol/lytes (Miralax) Packet 1 Packet  1 Packet Enteral Tube QDAY PRN Pedrito Gross M.D.        And    magnesium hydroxide (Milk Of Magnesia) suspension 30 mL  30 mL Enteral Tube QDAY PRN Pedrito Gross M.D.        And    bisacodyl (Dulcolax) suppository 10 mg  10 mg Rectal QDAY PRN Pedrito Gross M.D.        lidocaine (Xylocaine) 1 % injection 2 mL  2 mL Tracheal Tube Q30 MIN PRN Pedrito Gross M.D.        Pharmacy Consult: Enteral tube insertion - review meds/change route/product selection  1 Each Other PHARMACY TO DOSE Pedrito Gross M.D.        HYDROmorphone (Dilaudid) injection 0.5-1 mg  0.5-1 mg Intravenous Q HOUR PRN Pedrito Gross M.D.   1 mg at 06/11/24 2108    midazolam (Versed) injection 1-5 mg  1-5 mg Intravenous Q HOUR PRN Pedrito Gross M.D.        ipratropium-albuterol (DUONEB) nebulizer solution  3 mL Nebulization Q2HRS PRN (RT) Pedrito Gross M.D.           Fluids    Intake/Output  Summary (Last 24 hours) at 6/17/2024 1100  Last data filed at 6/17/2024 0600  Gross per 24 hour   Intake 1828.31 ml   Output 590 ml   Net 1238.31 ml       Laboratory  Recent Labs     06/15/24  0054 06/15/24  1009 06/16/24  1842 06/16/24  2304 06/17/24  0151 06/17/24  0500   YJACG03V 7.48  --   --   --  7.48  --    DEASGJ653O 39.4*  --   --   --  39.2*  --    VNWLA870S 82.1  --   --   --  339.1*  --    MGZG9FFC 95.3  --   --   --  99.2*  --    ARTHCO3 29*  --   --   --  29*  --    D8BGWQUXZ N/A  --   --   --  100  --    ARTBE 5*  --   --   --  5*  --    ISTATAPH  --    < > 7.552* 7.440  --  7.505*   ISTATAPCO2  --    < > 37.2* 47.8*  --  42.2*   ISTATAPO2  --    < > 79 132*  --  84   ISTATATCO2  --    < > 34* 34*  --  35*   DUQDOOJ0OSK  --    < > 97 99  --  97   ISTATARTHCO3  --    < > 32.7* 32.5*  --  33.3*   ISTATARTBE  --    < > 10* 7*  --  9*   ISTATTEMP  --    < > 37.3 C 37.4 C  --  36.9 C   ISTATFIO2  --    < > 40 40  --  30   ISTATSPEC  --    < > Arterial Arterial  --  Arterial   ISTATAPHTC  --    < > 7.548* 7.434  --  7.507*   PHTXTOLX9GV  --    < > 81 135*  --  83    < > = values in this interval not displayed.     Recent Labs     06/15/24  0541 06/16/24  0548   CPKTOTAL 2352* 1248*     Recent Labs     06/16/24  1813 06/17/24  0015 06/17/24  0608   SODIUM 134* 136 134*   POTASSIUM 5.8* 6.2* 6.4*   CHLORIDE 86* 87* 85*   CO2 27 27 27   * 151* 165*   CREATININE 12.87* 13.37* 13.74*   MAGNESIUM 4.8* 4.8* 4.7*   PHOSPHORUS 10.7* 12.5* 11.8*   CALCIUM 8.8 9.0 8.9     Recent Labs     06/14/24  2155 06/15/24  0054 06/15/24  0541 06/16/24  1813 06/17/24  0015 06/17/24  0608   ALTSGPT  --  1050*   < > 576* 485* 447*   ASTSGOT  --  301*   < > 159* 138* 126*   ALKPHOSPHAT  --  197*   < > 299* 325* 296*   TBILIRUBIN  --  1.0   < > 0.5 0.5 0.4   DBILIRUBIN 0.9* 0.8*   < > 0.4 0.3 0.3   GAMMAGT 229* 247*   < > 341* 337* 329*   AMYLASE 215*  --   --   --   --   --    LIPASE  --  417*  --   --   --   --    GLUCOSE  135* 128*   < > 112* 116* 107*    < > = values in this interval not displayed.     Recent Labs     06/16/24  1813 06/17/24  0015 06/17/24  0608   WBC 14.5* 17.1* 16.1*   NEUTSPOLYS 79.60* 84.90* 79.80*   LYMPHOCYTES 8.20* 4.20* 7.70*   MONOCYTES 8.70 8.10 9.20   EOSINOPHILS 2.50 1.80 2.20   BASOPHILS 0.20 0.20 0.20   ASTSGOT 159* 138* 126*   ALTSGPT 576* 485* 447*   ALKPHOSPHAT 299* 325* 296*   TBILIRUBIN 0.5 0.5 0.4     Recent Labs     06/16/24  1813 06/17/24  0015 06/17/24  0608   RBC 3.24* 3.15* 3.01*   HEMOGLOBIN 8.7* 8.5* 8.1*   HEMATOCRIT 25.1* 24.1* 23.4*   PLATELETCT 140* 142* 144*   PROTHROMBTM 13.6 13.7 13.6   APTT 39.5* 40.0* 35.3   INR 1.03 1.04 1.03       Imaging  X-Ray:  I have personally reviewed the images and compared with prior images.    Assessment/Plan  * Cardiac arrest (HCC)- (present on admission)  Assessment & Plan  Found unresponsive in asystole  Suspected fentanyl overdose with UDS + for amphetamines, fentanyl, cannabinoids    No acute ischemic change on EKG, CTA negative for PE, head CT unremarkable  Continue postarrest care with normothermia protocol, maintain euvolemia, lung protective ventilation, eucapnia, euglycemia  LV function improving    Non-traumatic rhabdomyolysis- (present on admission)  Assessment & Plan  Likely due to prolonged downtime     Trend CPK-->improving   S/P Bicarb infusion    Now with dense GAVIN/ATN     Shock (HCC)  Assessment & Plan  Undifferentiated shock post asystolic cardiac arrest   Initial TTE with depressed BiV function, now improved  Off vasoactives    Continue MAP goals > 65        Acute encephalopathy  Assessment & Plan  Suspicion for anoxic encephalopathy due to out of hospital cardiac arrest with unknown downtime  Toxic encephalopathy due to drug ingestion remains in differential  Continue supportive care, normothermia protocol  Initial head CT unremarkable  Stat EEG: no seizures  MRI brain consistent with severe anoxic brain injury      Lactic  acidosis  Assessment & Plan  Resolved   Secondary to cardiac arrest  S/p IVF resuscitation and bicarbonate  Trend lactic acid level    Transaminitis- (present on admission)  Assessment & Plan  Suspect ischemic hepatopathy due to cardiac arrest  Stable  Hepatitis panel was negative  No coagulopathy, synthetic function appears preserved    Acute kidney injury (HCC)  Assessment & Plan  Ishemic ATN with possible pigment nephropathy with rhabdo  Increasingly oliguric, now with renal failure as evidenced by hyperK and metabolic derangments and severe VOL  No change to UOP/creat with lasix and albumin    6/17-HD catheter placed and HD initiated    Limit nephrotoxins  Monitor UOP/creat        Acute respiratory failure with hypoxia (HCC)  Assessment & Plan  Out of hospital cardiac arrest and intubated on 6/10/2024  Cont full ventilator support  RT/O2 protocols  Ventilator bundle protocols  I am actively adjusting ventilator settings based on ABGs/vent mechanics  S/p bronchoscopy with BAL on 6/10, empiric Unasyn x 5 days, all cultures negative  S/p bronchoscopy with BAL on 6/15: negative cultures  6/16- Repeat BAL for plugging and hypoxemia, dense secretions clears LLL, restarted unasyn    Not responding to lasix      Goals of care, counseling/discussion  Assessment & Plan  6/12 - pt's mother and sister came from [place].  I gave them a detailed update of the patient's condition, prolonged downtime, concerns for severe non reversible brain injury due to hypoxia, and poor prognosis.  I explained that his exam was showing signs of brain death with lack of corneal/cough/gag reflexes and no w/d with painful stimuli.  Family demonstrated understanding and very emotional.  6/13 - pt's neuro exam unchanged.  I broached the subject of making the patient a DNR.  I consulted Palliative to assist with care.  6/14 - family conference with palliative care-->pt now DNAR and family would like to proceed with comfort care.  Pt is a  registered donor and will keep patient as comfortable as possible during their evaluation         I have performed a physical exam and reviewed and updated ROS and Plan today (6/17/2024). In review of yesterday's note (6/16/2024), there are no changes except as documented above.     Discussed patient condition and risk of morbidity and/or mortality with Family, RN, RT, Therapies, Pharmacy, Patient, and nephrology  The patient remains critically ill.  Critical care time = 85 minutes in directly providing and coordinating critical care and extensive data review.  No time overlap and excludes procedures.

## 2024-06-17 NOTE — PROGRESS NOTES
Dr. Pandey Notified of Potassium of 6.2 and Phosphorus of 12.5. Hyperkalemia med orders received.

## 2024-06-17 NOTE — PROGRESS NOTES
Swing intensivist note.     I was informed patient's K was high in 5.8, previous K was 5.7 at 1pm   Pt renal functions appears worsening and low UOP.  BUN/cr 136//13    Notably, ABG with worsening paO2 with 7.55/37/79  CXR with evidence of opacities in HARIS/LLL, concerning of mucus plugging.     Bronchoscopy was performed (see my procedure note for detail). Large amount of tenacious thick tan/bloody tinged secretions found in left main stem and this was all suctioned and cleared.     Assessment:   Acute kidney injury   Hyperkalemia  Mucus plugging  Acute hypoxic respiratory failure   Aspiration     I think his decrease in PaO2 was mainly driven by mucus plugging/aspiration.    If degree of hyperkalemia remains or pulm edema worsens despite treatment above, can be considered for dialysis. We discussed this with mother who gives consent for dialysis if this is necessary.     Plan:  Give hyperkalemia cocktail (calcium chloride, insulin and bicarb)   Lasix challenge with 120mg IV x1  Nephrology was consulted.   Repeat CXR  Serial ABGs, BMPs  Increase PEEP to 10   Resume unasyn given concern of recurrent aspiration. Treat for total 5 days.   HOB 45, vent bundle   Monitor for electrolytes and UOP. Strict I/Os  Continue collaboration with donor team   Family (mother) was updated. Mother at bedside.     Additional critical care time =  30 minutes.     Geoff Perry D.O.

## 2024-06-17 NOTE — PROCEDURES
BRONCHOSCOPY PROCEDURE NOTE      Date: 6/16/2024  Time: 9:32 PM    Time out: performed. Name, MRN, allergy and procedure were confirmed.     Indication: acute hypoxic resp failure, mucus plugging    Consent: Informed consent obtained from designated decision maker, mother    Procedure: Bronchoscopy with therapeutic aspiration of secretions    Sedation: none    Findings:  Respiratory therapy and nursing at bedside throughout procedure. Patient provided sedation and analgesia throughout the procedure. Placed on full ventilator support with an FiO2 of 100% during procedure. Using a fiberoptic bronchoscope, trachea entered via ET tube.      Large amount of thick blood-tinged tan secretions noted and completely plugging the left main stem. The secretions were tenacious and extended to mostly LLL and minimally to lingula. All of which was suctioned and cleared as much as I could. Right main stem was explored and no secretions noted in right main stem, subsegmental RUL, RML and RLL. No endobronchial lesions noted. Bronchial mucosa appears normal     Specimen sent to microbiology/pathology: none    Complications:   Patient tolerated procedure well without any difficulties.     Estimated blood loss: none      Geoff Perry D.O.  Critical Care Medicine

## 2024-06-17 NOTE — PROGRESS NOTES
Dr Pompa at bedside for dialysis cath placement     0735: Timeout called and all in agreement   0745: Procedure start   0800: Guidewire out and visualized   0815: Procedure end

## 2024-06-17 NOTE — CARE PLAN
Problem: Ventilation  Goal: Ability to achieve and maintain unassisted ventilation or tolerate decreased levels of ventilator support  Description: Target End Date:  4 days     Document on Vent flowsheet    1.  Support and monitor invasive and noninvasive mechanical ventilation  2.  Monitor ventilator weaning response  3.  Perform ventilator associated pneumonia prevention interventions  4.  Manage ventilation therapy by monitoring diagnostic test results  Outcome: Not Met     Ventilator Daily Summary    Vent Day #6  Airway: 8.0 @26    Ventilator settings: CMV R26 400 8 30    Weaning trials:   Respiratory Procedures:     Plan: Continue current ventilator settings and wean mechanical ventilation as tolerated per physician orders.

## 2024-06-17 NOTE — ASSESSMENT & PLAN NOTE
6/12 - pt's mother and sister came from [place].  I gave them a detailed update of the patient's condition, prolonged downtime, concerns for severe non reversible brain injury due to hypoxia, and poor prognosis.  I explained that his exam was showing signs of brain death with lack of corneal/cough/gag reflexes and no w/d with painful stimuli.  Family demonstrated understanding and very emotional.  6/13 - pt's neuro exam unchanged.  I broached the subject of making the patient a DNR.  I consulted Palliative to assist with care.  6/14 - family conference with palliative care-->pt now DNAR and family would like to proceed with comfort care.  Pt is a registered donor and will keep patient as comfortable as possible during their evaluation    6/17 - anticipate organ harvesting tomorrow in accordance with patient's donor wishes  6/18 - patient to undergo prayer rites

## 2024-06-17 NOTE — CONSULTS
DATE OF SERVICE:  06/17/2024     REQUESTING PHYSICIAN:  Immanuel Cruz MD     REASON FOR CONSULTATION:  Acute kidney injury and hyperkalemia.     The patient was seen and examined, medical record reviewed.     Unfortunately, the patient is intubated, unresponsive, unable to provide any   history.  Most of the story was reviewing record, discussing the case with Dr. Cruz.     HISTORY OF PRESENT ILLNESS:  The patient is an unfortunate 39-year-old   gentleman with a past medical history significant for amphetamine use, was   found unresponsive by his brother.  CPR was initiated and eventually the   patient was transferred to [facility] where he has been intubated,   his hospitalization has been complicated by acute kidney injury and   hyperkalemia.     The patient's family did decide to transition into comfort care only measures,   patient has been evaluated for potential organ donation, we were called to   manage his kidney disease, hyperkalemia and assessing the need for urgent   dialysis.     Again, the patient is unable to provide any history.     PAST MEDICAL HISTORY:  Unobtainable.  Please refer to the chart.     SOCIAL HISTORY:  Unobtainable.  Please refer to the chart.     FAMILY HISTORY:  Unobtainable.  Please refer to the chart.     MEDICATIONS:  Unobtainable.  Please refer to the chart.     REVIEW OF SYSTEMS:  Unobtainable.  Please refer to the chart.     ALLERGIES:  Unobtainable.  Please refer to the chart.     PHYSICAL EXAMINATION:  GENERAL:  The patient is intubated, unresponsive.  VITAL SIGNS:  Showed blood pressure of 153/83, heart rate was 102, respiratory   rate was 26.  HEENT:  Normocephalic, atraumatic.  Sclerae are anicteric.  Pupils are   reactive.  Nose normal.  Mucous membranes moist.  NECK:  No lymphadenopathy, no JVD, no thyroid mass.  CHEST:  Normal.  LUNGS:  Coarse breath sounds.  HEART:  S1, S2.  ABDOMEN:  Soft, nontender.  No hepatosplenomegaly.  There is no inguinal    lymphadenopathy.  EXTREMITIES:  There is trace lower extremity edema.  SKIN:  No skin rash.  NEUROLOGIC:  Patient is unresponsive.     LABORATORY DATA:  His recent labs from today were reviewed.     DIAGNOSTIC DATA:  The patient had a chest x-ray done earlier today.  I   reviewed the image myself, which showed pulmonary edema.     ASSESSMENT:  1.  Acute kidney injury, most likely acute tubular necrosis.  2.  Respiratory failure.  3.  Pulmonary edema.  4.  Hyperkalemia.  5.  Anemia.     PLAN:  1.  We will plan urgent dialysis to manage hyperkalemia and volume overload.  2.  Evaluate daily for the need of dialysis as the patient been evaluated for   organ donation.  3.  Renal dose all medications.  4.  Avoid nephrotoxin.  5.  Prognosis is poor.     Plan discussed in detail with Dr. Cruz.     Of note, Dr. Beltran has discussed the case with the patient's family, which   they agreed to start dialysis.        ______________________________  FADI NAJJAR, MD FN/SHA    DD:  06/17/2024 11:18  DT:  06/17/2024 11:59    Job#:  [id number]

## 2024-06-17 NOTE — PROGRESS NOTES
[facility] Progress Note     Hemodialysis treatment x3 hours completed as ordered per Dr Najjar.   Treatment performed at bedside started at 1005 and ended at 1305.      Net UF Removed: 3000 mL     Patient tolerated treatment well. UF goal reached as tolerated.  R-side IJ non-tunneled CVC access with good patency and flow x 2  Patient stable during and post treatment.   See Acute HD flow sheets on clinical data notes under media for details.      Post tx access: R-side IJ non-tunneled HD catheter flushed with saline then locked with heparin 1000 units/ml per designated amount in each lumen (see MAR) then clamped, capped aseptically and labeled properly. CVC dressings clean, dry and intact. No s/s of infection to HD catheter site. Heparin lock to be aspirated prior to next dialysis/CVC use by dialysis RN only. Please do not flush or draw from ports.     Please notify Nephrologist/Dialysis for follow-up.     Report given to primary care nurse .

## 2024-06-17 NOTE — CARE PLAN
The patient is Unstable - High likelihood or risk of patient condition declining or worsening    Shift Goals  Clinical Goals: Stable hemodynamics  Patient Goals: RAGINI  Family Goals: Updates    Progress made toward(s) clinical / shift goals:    Problem: Hemodynamics  Goal: Patient's hemodynamics, fluid balance and neurologic status will be stable or improve  Outcome: Progressing     Problem: Pain - Standard  Goal: Alleviation of pain or a reduction in pain to the patient’s comfort goal  Outcome: Progressing       Patient is not progressing towards the following goals:

## 2024-06-18 PROBLEM — E87.5 HYPERKALEMIA: Status: ACTIVE | Noted: 2024-01-01

## 2024-06-18 NOTE — CARE PLAN
Problem: Neuro Status  Goal: Neuro status will remain stable or improve  Outcome: Not Progressing     Problem: Hemodynamics  Goal: Patient's hemodynamics, fluid balance and neurologic status will be stable or improve  Outcome: Progressing  Note: HD today, pulled 3L      Problem: Respiratory  Goal: Patient will achieve/maintain optimum respiratory ventilation and gas exchange  Outcome: Progressing     Problem: Mechanical Ventilation  Goal: Safe management of artificial airway and ventilation  Outcome: Progressing     Problem: Bowel Elimination  Goal: Establish and maintain regular bowel function  Outcome: Progressing     Problem: Rectal Tube  Goal: Fecal output will be contained and skin will remain free from irritation  Outcome: Progressing     Problem: Pain - Standard  Goal: Alleviation of pain or a reduction in pain to the patient’s comfort goal  Outcome: Progressing  Note: CPOT 0 throughout shift      The patient is Unstable - High likelihood or risk of patient condition declining or worsening    Shift Goals  Clinical Goals: Stable hemodynamics  Patient Goals: RAGINI  Family Goals: Updates    Progress made toward(s) clinical / shift goals:     Patient is not progressing towards the following goals:      Problem: Neuro Status  Goal: Neuro status will remain stable or improve  Outcome: Not Progressing

## 2024-06-18 NOTE — PROGRESS NOTES
4 Eyes Skin Assessment Completed by OMAYRA Mercedes and OMAYRA Wei.    Head WDL  Ears WDL  Nose WDL  Mouth Redness and Ulcer(s), poss pressure unjury under upper lip   Neck WDL  Breast/Chest WDL  Shoulder Blades WDL  Spine Redness and Blanching, midspine scab  (R) Arm/Elbow/Hand Redness, Blanching, Swelling, and Edema  (L) Arm/Elbow/Hand Redness, Blanching, Swelling, and Edema  Abdomen Scab and Bruising  Groin WDL  Scrotum/Coccyx/Buttocks Blanching and Discoloration, hemorrhoid   (R) Leg Swelling, Edema   (L) Leg Swelling and Edema  (R) Heel/Foot/Toe Redness and Blanching, boggy heels   (L) Heel/Foot/Toe Redness and Blanching, boggy heels           Devices In Places ECG, Blood Pressure Cuff, Pulse Ox, Hood, Arterial Line, SCD's, ET Tube, and BMS      Interventions In Place Heel Mepilex, Sacral Mepilex, Heel Float Boots, TAP System, Pillows, Q2 Turns, Low Air Loss Mattress, and Barrier Cream    Possible Skin Injury Yes    Pictures Uploaded Into Epic Yes  Wound Consult Placed Yes  RN Wound Prevention Protocol Ordered Yes

## 2024-06-18 NOTE — PROGRESS NOTES
Nephrology Daily Progress Note    Date of Service  6/18/2024    Chief Complaint  39 y.o. male admitted 6/10/2024 with cardiac arrest    Interval Problem Update  Patient still vent dependent, plan for family meeting this afternoon and possible transition for organ donation  Seen and examined while getting HD.      Review of Systems  Review of Systems   Unable to perform ROS: Intubated        Physical Exam  Temp:  [36.8 °C (98.2 °F)-37.5 °C (99.5 °F)] 37.2 °C (99 °F)  Pulse:  [] 102  Resp:  [22-33] 26  BP: (125-164)/(64-99) 135/73  SpO2:  [96 %-100 %] 96 %    Physical Exam  Vitals and nursing note reviewed.   Constitutional:       Appearance: He is ill-appearing.      Interventions: He is sedated and intubated.   HENT:      Head: Normocephalic and atraumatic.      Right Ear: External ear normal.      Left Ear: External ear normal.      Nose: Nose normal.      Mouth/Throat:      Pharynx: No oropharyngeal exudate or posterior oropharyngeal erythema.      Comments: ETT  Eyes:      General:         Right eye: No discharge.         Left eye: No discharge.      Conjunctiva/sclera: Conjunctivae normal.   Cardiovascular:      Rate and Rhythm: Normal rate and regular rhythm.   Pulmonary:      Effort: Respiratory distress present. He is intubated.      Breath sounds: No wheezing.      Comments: Coarse BS  Abdominal:      General: Abdomen is flat. Bowel sounds are normal.   Musculoskeletal:      Cervical back: No rigidity. No muscular tenderness.      Right lower leg: Edema present.      Left lower leg: Edema present.   Skin:     General: Skin is warm and dry.      Coloration: Skin is not jaundiced.   Neurological:      Mental Status: He is unresponsive.         Fluids    Intake/Output Summary (Last 24 hours) at 6/18/2024 1252  Last data filed at 6/18/2024 1204  Gross per 24 hour   Intake 1913.55 ml   Output 7320 ml   Net -5406.45 ml       Laboratory  Recent Labs     06/18/24  0206 06/18/24  0602 06/18/24  1115   WBC 16.4*  "15.5* 21.7*   RBC 2.57* 2.38* 3.17*   HEMOGLOBIN 6.9* 6.4* 8.8*   HEMATOCRIT 20.3* 19.1* 25.4*   MCV 79.0* 80.3* 80.1*   MCH 26.8* 26.9* 27.8   MCHC 34.0 33.5 34.6   RDW 44.0 43.9 45.3   PLATELETCT 166 184 259   MPV 11.7 12.3 11.5     Recent Labs     06/18/24  0053 06/18/24  0408 06/18/24  0602 06/18/24  1115   SODIUM 136 138  --  140   POTASSIUM 7.4* 6.6* 6.9* 4.8   CHLORIDE 90* 92*  --  96   CO2 28 28  --  28   GLUCOSE 95 130*  --  105*   * 128*  --  75*   CREATININE 9.63* 9.84*  --  5.15*   CALCIUM 8.4* 9.0  --  8.7     Recent Labs     06/18/24  0053 06/18/24  0602 06/18/24  1115   APTT 35.6 35.0 35.1   INR 1.11 1.13 1.03     No results for input(s): \"NTPROBNP\" in the last 72 hours.        Imaging  DX-CHEST-LIMITED (1 VIEW)   Final Result         1. Greater parenchymal volume loss involving the left lower lobe, with interval increase in size of the small left pleural effusion.   2. The right lung is clear.   3. Appropriate positions of the life-support lines and tubes.      DX-CHEST-LIMITED (1 VIEW)   Final Result         1.  Pulmonary edema and/or infiltrates, stable since prior study.   2.  Trace bilateral pleural effusions      DX-CHEST-LIMITED (1 VIEW)   Final Result         1.  Hazy left pulmonary infiltrates   2.  Trace left pleural effusion      EC-ECHOCARDIOGRAM COMPLETE W/ CONT   Final Result      DX-CHEST-LIMITED (1 VIEW)   Final Result      1.  Mild interstitial infiltrates and/or edema.   2.  Support apparatus as above.      DX-CHEST-PORTABLE (1 VIEW)   Final Result      New right IJ central venous catheter extending into the superior vena cava. There is no pneumothorax.      DX-CHEST-LIMITED (1 VIEW)   Final Result         1.  Pulmonary edema and/or infiltrates, decreased in the left upper lobe since prior study.   2.  Small left pleural effusion, stable since prior study.      DX-CHEST-LIMITED (1 VIEW)   Final Result         1.  Pulmonary edema and/or infiltrates, increased in the left upper " lobe since prior study.   2.  Small left pleural effusion, increased since prior study.      EC-ECHOCARDIOGRAM COMPLETE W/ CONT   Final Result      DX-CHEST-LIMITED (1 VIEW)   Final Result         Endotracheal tube position is high. It is 8.2 cm above shantelle. Recommend advancement.      Critical value called by Dr. Agus Ramachandran to nurse Up at 6/16/2024 1:57 PM.      DX-CHEST-LIMITED (1 VIEW)   Final Result      1.  All lines and tubes appear appropriately located      2.  Minimal left basilar atelectasis      DX-CHEST-LIMITED (1 VIEW)   Final Result      1.  Perihilar and basilar opacities are unchanged. This could be pulmonary edema or pneumonia.  Findings in the LEFT lower lobe in particular are suspicious for pneumonia   2.  Suspect small LEFT pleural effusion.      DX-CHEST-LIMITED (1 VIEW)   Final Result      Perihilar and basilar opacities are increased. This could be pulmonary edema or pneumonia.      DX-CHEST-LIMITED (1 VIEW)   Final Result      1.  No acute cardiopulmonary abnormality identified.      2.  Left internal jugular catheter and enteric catheter appear appropriately located      CT-CHEST,ABDOMEN,PELVIS W/O   Final Result      1.  Bilateral lower lung pulmonary opacity and volume loss suggesting consolidation and atelectasis.   2.  Small pleural effusion.   3.  Trace ascites.   4.  Anasarca.   5.  Rectal tube. Perirectal fat stranding and edema.   6.  Curvilinear density in the right lower quadrant pain. Question appendix with inspissated material. This is not dilated.      EC-ECHOCARDIOGRAM COMPLETE W/ CONT   Final Result      DX-CHEST-LIMITED (1 VIEW)   Final Result      No significant change      DX-CHEST-LIMITED (1 VIEW)   Final Result      1.  Unchanged central and LEFT greater than RIGHT basilar pulmonary opacities likely a combination of atelectasis and edema or pneumonia   2.  Slight interval retraction of endotracheal tube      DX-CHEST-LIMITED (1 VIEW)   Final Result      1.   Increased central and LEFT greater than RIGHT basilar pulmonary opacities likely a combination of atelectasis and edema or pneumonia   2.  Slight interval retraction of endotracheal tube      MR-BRAIN-W/O   Final Result         The diffusion-weighted sequences demonstrates diffuse area of restricted diffusion in the supratentorial gray matter. There are also areas of restricted diffusion in the bilateral basal ganglia. These findings are consistent with diffuse anoxic brain    injury.      DX-CHEST-PORTABLE (1 VIEW)   Final Result         1.  No acute cardiopulmonary disease.      DX-CHEST-PORTABLE (1 VIEW)   Final Result         1.  No acute cardiopulmonary disease.   2.  Endotracheal tube terminates at T3, could be advanced 1 to 2 cm.      DX-CHEST-PORTABLE (1 VIEW)   Final Result         1.  No acute cardiopulmonary disease.      EC-ECHOCARDIOGRAM COMPLETE W/ CONT   Final Result      DX-CHEST-PORTABLE (1 VIEW)   Final Result      1.  Interval placement of left internal jugular central venous catheter.   2.  Interval placement of enteric sump tube.   3.  No evidence of procedure-related pneumothorax.      DX-ABDOMEN FOR TUBE PLACEMENT   Final Result      Enteric tube tip projects over the upper stomach      CT-CTA CHEST PULMONARY ARTERY W/ RECONS   Final Result      1.  No central or segmental pulmonary embolus   2.  BILATERAL dependent airspace disease airway fluid suspicious for aspiration pneumonia   3.  Hepatic steatosis   4.  Trace LEFT anterior chest wall gas of uncertain etiology            CT-HEAD W/O   Final Result      No acute intracranial abnormality.            DX-CHEST-PORTABLE (1 VIEW)   Final Result         1.  No acute cardiopulmonary disease.      DX-CHEST-LIMITED (1 VIEW)    (Results Pending)   DX-CHEST-LIMITED (1 VIEW)    (Results Pending)         Assessment/Plan  1 cardiac arrest  2 VDRF  3 GAVIN  4 hyperkalemia  Plan for HD today to manage hyperkalemia, volume overload as well as uremia  Renal  dose all meds  Avoid nephrotoxins like NSAIDs.  Prognosis poor.  Plan discussed with Dr. Cruz

## 2024-06-18 NOTE — PROGRESS NOTES
[facility] Progress Note      Admit Date: 6/10/2024    Resident(s): Virginie Mayfield M.D.   Attending:  ARACELIS FITZGERALD/ Dr. Cruz    Patient ID:    Name:  Luis Alfredo Mora     YOB: 1985  Age:  39 y.o.  male   MRN:  [id number]    Hospital Course (carried forward and updated):  Mr. Sands is a 39 year old male with no known past medical history who was found down by his brother on 6/10/2024 with unknown downtime.  The brother initiated CPR and EMS was activated.  When EMS arrived, they found the patient in asystole requiring 2 rounds of CPR with epinephrine before ROSC was achieved.  Unfortunately, the patient lost pulses again and arrived to the ER with CPR in progress receiving more epinephrine and bicarbonate for severe acidemia.  The patient's work up revealed a urine drug screen positive for amphetamines, cannabinoids, and fentanyl.  He was admitted to the ICU for ongoing care.  6/11 - VD#2, no gag/cough/corneal, decorticate posturing with sternal rub, worsening renal function, updated family of very poor prognosis  6/12 - VD#3, no gag/cough/corneal, no posturing or w/d with noxious stimuli, creat now at 5, mother and sister in town-->updated with concerns that patient may proceed to brain death  6/13 - VD#4, no gag/cough/corneal, no w/d to pain, creat at 6 with very little UOP, Palliative consulted, MRI ordered, updated family  6/14 - VD#5, no gag/cough/corneal, no w/d to pain, creat to 8, anuric, MRI brain with anoxic brain injury  6/16-BAL performed for hypoxemia and plugging, unasyn resumed, hyperK shifting therapy and lasix challenge w minimal UOP  6/17 - HD for elevated K and fluid retention.    Consultants:  Critical Care  Nephrology  [facility]  Palliative  Cardiology    Interval Events:  no acute events overnight. Patient to undergo another session of HD with 1u of pRBCs for decreased Hg. Per [facility] patient to have last rites for 1pm.    Vitals Range last 24h:  Temp:  [36.6 °C (97.9  °F)-37.5 °C (99.5 °F)] 37.2 °C (99 °F)  Pulse:  [] 102  Resp:  [22-33] 26  BP: (125-165)/(64-99) 135/73  SpO2:  [96 %-100 %] 96 %      Intake/Output Summary (Last 24 hours) at 6/18/2024 1049  Last data filed at 6/18/2024 0600  Gross per 24 hour   Intake 1413.55 ml   Output 3820 ml   Net -2406.45 ml        Review of Systems   Unable to perform ROS: Acuity of condition       PHYSICAL EXAM:  Vitals:    06/18/24 0700 06/18/24 0904 06/18/24 0915 06/18/24 0919   BP: 136/77   135/73   Pulse: 100 (!) 104  (!) 102   Resp: (!) 26 (!) 26  (!) 26   Temp:   37.2 °C (99 °F) 37.2 °C (99 °F)   TempSrc:   Bladder    SpO2: 97% 96%  96%   Weight:       Height:        Body mass index is 27.49 kg/m².    O2 therapy: Pulse Oximetry: 96 %, O2 Delivery Device: Ventilator         Physical Exam  Constitutional:       Appearance: He is ill-appearing.   HENT:      Head: Normocephalic and atraumatic.      Right Ear: Tympanic membrane, ear canal and external ear normal.      Left Ear: Tympanic membrane, ear canal and external ear normal.      Nose: Nose normal.      Mouth/Throat:      Pharynx: Oropharynx is clear.   Eyes:      Extraocular Movements: Extraocular movements intact.      Conjunctiva/sclera: Conjunctivae normal.      Pupils: Pupils are equal, round, and reactive to light.   Cardiovascular:      Rate and Rhythm: Tachycardia present.      Pulses: Normal pulses.   Pulmonary:      Effort: Pulmonary effort is normal.   Abdominal:      General: Abdomen is flat.   Musculoskeletal:         General: Swelling present.      Cervical back: Normal range of motion.      Right lower leg: Edema present.      Left lower leg: Edema present.   Neurological:      Mental Status: He is unresponsive.      GCS: GCS eye subscore is 1. GCS verbal subscore is 1. GCS motor subscore is 1.         Recent Labs     06/16/24  2304 06/17/24  0151 06/17/24  0500 06/17/24  1755 06/18/24  0311   [id number]  --  7.48  --  7.46  --    [id number]  --  39.2*  --  35.9   --    [id number]  --  339.1*  --  89.3*  --    [id number]  --  99.2*  --  95.2  --    ARTHCO3  --  29*  --  25  --    [id number]  --  100  --  98%  --    ARTBE  --  5*  --  2  --    ISTATAPH 7.440  --  7.505*  --  7.511*   ISTATAPCO2 47.8*  --  42.2*  --  44.0*   ISTATAPO2 132*  --  84  --  74   ISTATATCO2 34*  --  35*  --  37*   [id number] 99  --  97  --  96   ISTATARTHCO3 32.5*  --  33.3*  --  35.2*   ISTATARTBE 7*  --  9*  --  11*   ISTATTEMP 37.4 C  --  36.9 C  --  37.4 C   ISTATFIO2 40  --  30  --  30   ISTATSPEC Arterial  --  Arterial  --  Arterial   ISTATAPHTC 7.434  --  7.507*  --  7.505*   [id number] 135*  --  83  --  76     Recent Labs     06/17/24 1755 06/17/24 1955 06/18/24 0053 06/18/24 0408 06/18/24  0602   SODIUM 137  --  136 138  --    POTASSIUM 6.9*   < > 7.4* 6.6* 6.9*   CHLORIDE 91*  --  90* 92*  --    CO2 28  --  28 28  --    *  --  115* 128*  --    CREATININE 8.64*  --  9.63* 9.84*  --    MAGNESIUM 3.6*  --  3.8*  --  3.7*   PHOSPHORUS 10.9*  --  11.1*  --  10.9*   CALCIUM 8.6  --  8.4* 9.0  --     < > = values in this interval not displayed.     Recent Labs     06/17/24  1425 06/17/24 1755 06/18/24 0053 06/18/24 0408 06/18/24  0602   ALTSGPT 424* 387* 332* 307*  --    ASTSGOT 146* 132* 109* 99*  --    ALKPHOSPHAT 364* 352* 322* 287*  --    TBILIRUBIN 0.6 0.5 0.4 0.3  --    DBILIRUBIN 0.3 0.3 0.2  --  0.2   GAMMAGT 350* 326* 291*  --  278*   PREALBUMIN 22.8  --   --   --   --    GLUCOSE 116* 114* 95 130*  --      Recent Labs     06/17/24 1755 06/18/24 0053 06/18/24  0206 06/18/24  0602   RBC 3.38*  --  2.57* 2.38*   HEMOGLOBIN 9.0*  --  6.9* 6.4*   HEMATOCRIT 26.8*  --  20.3* 19.1*   PLATELETCT 140*  --  166 184   PROTHROMBTM 13.6 14.5  --  14.6   APTT 32.7 35.6  --  35.0   INR 1.03 1.11  --  1.13     Recent Labs     06/17/24 1755 06/18/24 0053 06/18/24  0206 06/18/24  0408 06/18/24  0602   WBC 16.3*  --  16.4*  --  15.5*   NEUTSPOLYS 87.00*  --  78.10*  --  73.60*   LYMPHOCYTES  5.50*  --  9.00*  --  12.30*   MONOCYTES 5.60  --  9.80  --  11.00   EOSINOPHILS 0.40  --  1.70  --  1.70   BASOPHILS 0.10  --  0.20  --  0.10   ASTSGOT 132* 109*  --  99*  --    ALTSGPT 387* 332*  --  307*  --    ALKPHOSPHAT 352* 322*  --  287*  --    TBILIRUBIN 0.5 0.4  --  0.3  --        Meds:   heparin        HYDROmorphone  2 mg      HYDROmorphone  4 mg      ondansetron  8 mg      Or    ondansetron  8 mg      midazolam  5 mg      heparin  300 Units/kg      furosemide  120 mg      heparin  500 Units      heparin  1,500 Units      heparin  1,200 Units      heparin  1,200 Units      labetalol  20 mg      Respiratory Therapy Consult        ampicillin-sulbactam (UNASYN) IV  3 g Stopped (06/17/24 1930)    HYDROmorphone   4 mg/hr (06/18/24 0704)    acetaminophen  650 mg      artificial tears  1 Application      hydrALAZINE  20 mg      heparin  5,000 Units      carboxymethylcellulose  1 Drop      famotidine  20 mg      senna-docusate  2 Tablet      And    polyethylene glycol/lytes  1 Packet      And    magnesium hydroxide  30 mL      And    bisacodyl  10 mg      lidocaine  2 mL      Pharmacy  1 Each      HYDROmorphone  0.5-1 mg      midazolam  1-5 mg      ipratropium-albuterol  3 mL          Procedures:  Central line  BAL  EEG  Arterial line    Imaging:  DX-CHEST-LIMITED (1 VIEW)   Final Result         1. Greater parenchymal volume loss involving the left lower lobe, with interval increase in size of the small left pleural effusion.   2. The right lung is clear.   3. Appropriate positions of the life-support lines and tubes.      DX-CHEST-LIMITED (1 VIEW)   Final Result         1.  Pulmonary edema and/or infiltrates, stable since prior study.   2.  Trace bilateral pleural effusions      DX-CHEST-LIMITED (1 VIEW)   Final Result         1.  Hazy left pulmonary infiltrates   2.  Trace left pleural effusion      EC-ECHOCARDIOGRAM COMPLETE W/ CONT   Final Result      DX-CHEST-LIMITED (1 VIEW)   Final Result      1.  Mild  interstitial infiltrates and/or edema.   2.  Support apparatus as above.      DX-CHEST-PORTABLE (1 VIEW)   Final Result      New right IJ central venous catheter extending into the superior vena cava. There is no pneumothorax.      DX-CHEST-LIMITED (1 VIEW)   Final Result         1.  Pulmonary edema and/or infiltrates, decreased in the left upper lobe since prior study.   2.  Small left pleural effusion, stable since prior study.      DX-CHEST-LIMITED (1 VIEW)   Final Result         1.  Pulmonary edema and/or infiltrates, increased in the left upper lobe since prior study.   2.  Small left pleural effusion, increased since prior study.      EC-ECHOCARDIOGRAM COMPLETE W/ CONT   Final Result      DX-CHEST-LIMITED (1 VIEW)   Final Result         Endotracheal tube position is high. It is 8.2 cm above shantelle. Recommend advancement.      Critical value called by Dr. Agus Ramachandran to nurse Up at 6/16/2024 1:57 PM.      DX-CHEST-LIMITED (1 VIEW)   Final Result      1.  All lines and tubes appear appropriately located      2.  Minimal left basilar atelectasis      DX-CHEST-LIMITED (1 VIEW)   Final Result      1.  Perihilar and basilar opacities are unchanged. This could be pulmonary edema or pneumonia.  Findings in the LEFT lower lobe in particular are suspicious for pneumonia   2.  Suspect small LEFT pleural effusion.      DX-CHEST-LIMITED (1 VIEW)   Final Result      Perihilar and basilar opacities are increased. This could be pulmonary edema or pneumonia.      DX-CHEST-LIMITED (1 VIEW)   Final Result      1.  No acute cardiopulmonary abnormality identified.      2.  Left internal jugular catheter and enteric catheter appear appropriately located      CT-CHEST,ABDOMEN,PELVIS W/O   Final Result      1.  Bilateral lower lung pulmonary opacity and volume loss suggesting consolidation and atelectasis.   2.  Small pleural effusion.   3.  Trace ascites.   4.  Anasarca.   5.  Rectal tube. Perirectal fat stranding and edema.    6.  Curvilinear density in the right lower quadrant pain. Question appendix with inspissated material. This is not dilated.      EC-ECHOCARDIOGRAM COMPLETE W/ CONT   Final Result      DX-CHEST-LIMITED (1 VIEW)   Final Result      No significant change      DX-CHEST-LIMITED (1 VIEW)   Final Result      1.  Unchanged central and LEFT greater than RIGHT basilar pulmonary opacities likely a combination of atelectasis and edema or pneumonia   2.  Slight interval retraction of endotracheal tube      DX-CHEST-LIMITED (1 VIEW)   Final Result      1.  Increased central and LEFT greater than RIGHT basilar pulmonary opacities likely a combination of atelectasis and edema or pneumonia   2.  Slight interval retraction of endotracheal tube      MR-BRAIN-W/O   Final Result         The diffusion-weighted sequences demonstrates diffuse area of restricted diffusion in the supratentorial gray matter. There are also areas of restricted diffusion in the bilateral basal ganglia. These findings are consistent with diffuse anoxic brain    injury.      DX-CHEST-PORTABLE (1 VIEW)   Final Result         1.  No acute cardiopulmonary disease.      DX-CHEST-PORTABLE (1 VIEW)   Final Result         1.  No acute cardiopulmonary disease.   2.  Endotracheal tube terminates at T3, could be advanced 1 to 2 cm.      DX-CHEST-PORTABLE (1 VIEW)   Final Result         1.  No acute cardiopulmonary disease.      EC-ECHOCARDIOGRAM COMPLETE W/ CONT   Final Result      DX-CHEST-PORTABLE (1 VIEW)   Final Result      1.  Interval placement of left internal jugular central venous catheter.   2.  Interval placement of enteric sump tube.   3.  No evidence of procedure-related pneumothorax.      DX-ABDOMEN FOR TUBE PLACEMENT   Final Result      Enteric tube tip projects over the upper stomach      CT-CTA CHEST PULMONARY ARTERY W/ RECONS   Final Result      1.  No central or segmental pulmonary embolus   2.  BILATERAL dependent airspace disease airway fluid  suspicious for aspiration pneumonia   3.  Hepatic steatosis   4.  Trace LEFT anterior chest wall gas of uncertain etiology            CT-HEAD W/O   Final Result      No acute intracranial abnormality.            DX-CHEST-PORTABLE (1 VIEW)   Final Result         1.  No acute cardiopulmonary disease.      DX-CHEST-LIMITED (1 VIEW)    (Results Pending)   DX-CHEST-LIMITED (1 VIEW)    (Results Pending)       ASSESSEMENT and PLAN:    * Cardiac arrest (HCC)- (present on admission)  Assessment & Plan  Found unresponsive in asystole  Suspected fentanyl overdose with UDS + for amphetamines, fentanyl, cannabinoids     No acute ischemic change on EKG, CTA negative for PE, head CT unremarkable  Continue postarrest care with normothermia protocol, maintain euvolemia, lung protective ventilation, eucapnia, euglycemia  LV function improving       Goals of care, counseling/discussion  Assessment & Plan  6/12 - pt's mother and sister came [place].  I gave them a detailed update of the patient's condition, prolonged downtime, concerns for severe non reversible brain injury due to hypoxia, and poor prognosis.  I explained that his exam was showing signs of brain death with lack of corneal/cough/gag reflexes and no w/d with painful stimuli.  Family demonstrated understanding and very emotional.  6/13 - pt's neuro exam unchanged.  I broached the subject of making the patient a DNR.  I consulted Palliative to assist with care.  6/14 - family conference with palliative care-->pt now DNAR and family would like to proceed with comfort care.  Pt is a registered donor and will keep patient as comfortable as possible during their evaluation    6/17 - anticipate organ harvesting tomorrow in accordance with patient's donor wishes  6/18 - patient to undergo prayer jason       Acute kidney injury (HCC)  Assessment & Plan  Ishemic ATN with possible pigment nephropathy with rhabdo  Increasingly oliguric, now with renal failure as evidenced by  hyperK and metabolic derangments and severe VOL  No change to UOP/creat with lasix and albumin     Dialysis catheter placed with emergent dialysis 6/17. HD again 6/18.     Limit nephrotoxins  Monitor UOP/creat       Hyperkalemia  Assessment & Plan  HD    Polysubstance use disorder  Assessment & Plan  UDS positive for amphetamines, cannabinoids, fentanyl.     Non-traumatic rhabdomyolysis- (present on admission)  Assessment & Plan  Likely due to prolonged downtime      Trend CPK-->improving   S/P Bicarb infusion     Now with dense GAVIN/ATN     Shock (HCC)  Assessment & Plan  Undifferentiated shock post asystolic cardiac arrest   Initial TTE with depressed BiV function, now improved  Off vasoactives     Continue MAP goals > 65    Acute encephalopathy  Assessment & Plan  Suspicion for anoxic encephalopathy due to out of hospital cardiac arrest with unknown downtime  Toxic encephalopathy due to drug ingestion remains in differential  Continue supportive care, normothermia protocol  Initial head CT unremarkable  Stat EEG: no seizures  MRI brain consistent with severe anoxic brain injury       Lactic acidosis  Assessment & Plan  Resolved   Secondary to cardiac arrest  S/p IVF resuscitation and bicarbonate  Trend lactic acid level       Transaminitis- (present on admission)  Assessment & Plan  Suspect ischemic hepatopathy due to cardiac arrest  Stable  Hepatitis panel was negative  No coagulopathy, synthetic function appears preserved    Acute respiratory failure with hypoxia (HCC)  Assessment & Plan  Out of hospital cardiac arrest and intubated on 6/10/2024  Cont full ventilator support  RT/O2 protocols  Ventilator bundle protocols  I am actively adjusting ventilator settings based on ABGs/vent mechanics  S/p bronchoscopy with BAL on 6/10, empiric Unasyn x 5 days, all cultures negative  S/p bronchoscopy with BAL on 6/15: negative cultures  6/16- Repeat BAL for plugging and hypoxemia, dense secretions clears LLL, restarted  unasyn     Not responding to lasix          Virginie Mayfield M.D.

## 2024-06-18 NOTE — CARE PLAN
Problem: Ventilation  Goal: Ability to achieve and maintain unassisted ventilation or tolerate decreased levels of ventilator support  Description: Target End Date:  4 days     Document on Vent flowsheet    1.  Support and monitor invasive and noninvasive mechanical ventilation  2.  Monitor ventilator weaning response  3.  Perform ventilator associated pneumonia prevention interventions  4.  Manage ventilation therapy by monitoring diagnostic test results  Outcome: Not Progressing     Problem: Ventilation Defect:  Goal: Ability to achieve and maintain unassisted ventilation or tolerate decreased levels of ventilator support  Outcome: Not Progressing    Ventilator Daily Summary    Vent Day #8  Airway: 8.0 @26    Ventilator settings: 26/400/8/30%  Weaning trials:  no   Respiratory Procedures: no     Plan: Continue current ventilator settings and wean mechanical ventilation as tolerated per physician orders.

## 2024-06-18 NOTE — PROGRESS NOTES
Hemodialysis #2 ordered by Dr. Najjar. Treatment started at 0904 and ended at 1204. Pt stable, vss, no distressed post tx. Net UF 3 L. Report to GAURI Pandey RN.

## 2024-06-18 NOTE — ASSESSMENT & PLAN NOTE
Critical  Initiated HD 6/17  Continues to have refractory hyperK overnight  Shifting therapy multiple rounds    Will dialyze this AL  Consider fludrocortisone if refractory

## 2024-06-18 NOTE — PROGRESS NOTES
4 Eyes Skin Assessment Completed by OMAYRA Walters and OMAYRA Kolb.    Head WDL  Ears WDL  Nose WDL  Mouth Redness and Ulcer(s), injury under Lip  Neck WDL  Breast/Chest WDL  Shoulder Blades WDL  Spine Redness and Blanching, scab mid back  (R) Arm/Elbow/Hand Redness, Blanching, and Edema  (L) Arm/Elbow/Hand Redness, Blanching, and Swelling  Abdomen Redness  Groin Redness  Scrotum/Coccyx/Buttocks Redness and Blanching, hemmorrhoid  (R) Leg Swelling and Edema  (L) Leg Swelling and Edema  (R) Heel/Foot/Toe Redness and Blanching  (L) Heel/Foot/Toe Redness and Blanching          Devices In Places ECG, Blood Pressure Cuff, Pulse Ox, Hood, Arterial Line, and BMS      Interventions In Place Sacral Mepilex, Heel Float Boots, Elbow Mepilex, Q2 Turns, Low Air Loss Mattress, and Barrier Cream    Possible Skin Injury Yes    Pictures Uploaded Into Epic Yes  Wound Consult Placed Yes  RN Wound Prevention Protocol Ordered Yes

## 2024-06-18 NOTE — DISCHARGE PLANNING
Medical Social Work    MSW received a call from [facility] RN that family has requested a  meet with them and perform last rites for pt later today prior to [facility]or Walk that is scheduled for 1300.  Order for spiritual care was placed by staff and SW sent e-mail to  Brent requesting follow up to assure  visits with family.

## 2024-06-18 NOTE — DISCHARGE SUMMARY
Death Summary    Cause of Death  Hypoxic respiratory failure due to diffuse anoxic brain injury due to out of hospital cardiac arrest due to fentanyl overdose.    Comorbid Conditions at the Time of Death  Principal Problem:    Cardiac arrest (HCC) (POA: Yes)  Active Problems:    Acute respiratory failure with hypoxia (HCC) (POA: Unknown)    Acute kidney injury (HCC) (POA: Unknown)    Transaminitis (POA: Yes)    Lactic acidosis (POA: Unknown)    Acute encephalopathy (POA: Unknown)    Shock (HCC) (POA: Unknown)    Non-traumatic rhabdomyolysis (POA: Yes)    Polysubstance use disorder (POA: Unknown)    Goals of care, counseling/discussion (POA: Unknown)    Hyperkalemia (POA: Unknown)  Resolved Problems:    * No resolved hospital problems. *      History of Presenting Illness and Hospital Course  Mr. Sands is a 39 year old male with no known past medical history who was found down by his brother on 6/10/2024 with unknown downtime.  The brother initiated CPR and EMS was activated.  When EMS arrived, they found the patient in asystole requiring 2 rounds of CPR with epinephrine before ROSC was achieved.  Unfortunately, the patient lost pulses again and arrived to the ER with CPR in progress receiving more epinephrine and bicarbonate for severe acidemia.  The patient's work up revealed a urine drug screen positive for amphetamines, cannabinoids, and fentanyl.  He was admitted to the ICU for ongoing care.    6/11 - VD#2, no gag/cough/corneal, decorticate posturing with sternal rub, worsening renal function, updated family of very poor prognosis  6/12 - VD#3, no gag/cough/corneal, no posturing or w/d with noxious stimuli, creat now at 5, mother and sister in town-->updated with concerns that patient may proceed to brain death  6/13 - VD#4, no gag/cough/corneal, no w/d to pain, creat at 6 with very little UOP, Palliative consulted, MRI ordered, updated family  6/14 - VD#5, no gag/cough/corneal, no w/d to pain, creat to 8,  anuric, MRI brain with anoxic brain injury  -BAL performed for hypoxemia and plugging, unasyn resumed, hyperK shifting therapy and lasix challenge w minimal UOP  -No neuro changes, HD initiated for critical hyperK and VOL    On  he was taken for DCD  Heparin given at: 1540  Extubated at: 1545      Luis Alfredo Mora was determined to have  based on the following physical exam:    CV: No palpable pulse, no cardiac sounds on auscultation  Pulm: No spontaneous respirations, no respiratory sounds on auscultation  Neuro:  I found the cephalic reflexes/brainstem functions were absent as follows:   - No pupillary reaction to light  - No corneal reflex  - No oculocephalic reflex ([place]olls Eyes)  - No cough reflex  - No gag reflex  - No purposeful response to deep pressure/noxious stimuli    Date of Death: 24   Time of Death: 1603  Pronounced By: Gurmeet Wetzel M.D.    Gurmeet Wetzel M.D.

## 2024-06-18 NOTE — CARE PLAN
The patient is Unstable - High likelihood or risk of patient condition declining or worsening    Shift Goals  Clinical Goals: Stable hemodynamics  Patient Goals: RAGINI  Family Goals: Updates    Progress made toward(s) clinical / shift goals:    Problem: Skin Integrity  Goal: Skin integrity is maintained or improved  Outcome: Progressing     Problem: Pain - Standard  Goal: Alleviation of pain or a reduction in pain to the patient’s comfort goal  Outcome: Progressing       Patient is not progressing towards the following goals:

## 2024-06-18 NOTE — PROGRESS NOTES
Critical Care Progress Note    Date of admission  6/10/2024    Chief Complaint  39 y.o. male admitted 6/10/2024 with cardiac arrest     Hospital Course  Mr. Sands is a 39 year old male with no known past medical history who was found down by his brother on 6/10/2024 with unknown downtime.  The brother initiated CPR and EMS was activated.  When EMS arrived, they found the patient in asystole requiring 2 rounds of CPR with epinephrine before ROSC was achieved.  Unfortunately, the patient lost pulses again and arrived to the ER with CPR in progress receiving more epinephrine and bicarbonate for severe acidemia.  The patient's work up revealed a urine drug screen positive for amphetamines, cannabinoids, and fentanyl.  He was admitted to the ICU for ongoing care.  6/11 - VD#2, no gag/cough/corneal, decorticate posturing with sternal rub, worsening renal function, updated family of very poor prognosis  6/12 - VD#3, no gag/cough/corneal, no posturing or w/d with noxious stimuli, creat now at 5, mother and sister in town-->updated with concerns that patient may proceed to brain death  6/13 - VD#4, no gag/cough/corneal, no w/d to pain, creat at 6 with very little UOP, Palliative consulted, MRI ordered, updated family  6/14 - VD#5, no gag/cough/corneal, no w/d to pain, creat to 8, anuric, MRI brain with anoxic brain injury  6/16-BAL performed for hypoxemia and plugging, unasyn resumed, hyperK shifting therapy and lasix challenge w minimal UOP  6/17-No neuro changes, HD initiated for critical hyperK and VOL    Interval Problem Update  Reviewed last 24 hour events:  HD yesterday 3L UF    Despite this had refractory hyperK overnight  Received shifting therapy x 2  Hb drifted to 6.9, transfused 1 U PRBCs    Neuro:     CV:  HR   -150s    Resp:   APV CMV  26/400/5/0.3  7.5/44/74/96%  CXR stable pulmonary edema    GI: BMS 300cc    I/O: -2.4L (3L UF)  UOP: 50    Tmax: AF  Heme: WBC 16.4    Abx:   (6/10-6/14) Unasyn  empiric post arrest  (6/16- ) Unasyn resumed (presumed PNA)    Micro:  6/16- BAL BGTD  Prior Cx data without growth  MRSA PCR neg  RVP neg    Endo: BG WTR, ISS    LDA: PIVs, RRAL, LIJ CVC, RIJ HD cath, ETT, Rectal tube, NGT  SUP: H2RA  VTE: SQH  Diet: EN      Review of Systems  Review of Systems   Unable to perform ROS: Critical illness        Vital Signs for last 24 hours   Temp:  [36.6 °C (97.9 °F)-37.5 °C (99.5 °F)] 37.1 °C (98.8 °F)  Pulse:  [] 100  Resp:  [22-33] 26  BP: (117-165)/(59-99) 136/77  SpO2:  [96 %-100 %] 97 %    Hemodynamic parameters for last 24 hours  CVP:  [12 MM HG-15 MM HG] 12 MM HG    Respiratory Information for the last 24 hours  Vent Mode: APVCMV  Rate (breaths/min): 26  Vt Target (mL): 400  PEEP/CPAP: 8  MAP: 13  Control VTE (exp VT): 399    Physical Exam   Physical Exam  Vitals and nursing note reviewed.   Constitutional:       General: He is not in acute distress.     Appearance: He is ill-appearing and toxic-appearing.   HENT:      Head: Normocephalic and atraumatic.      Right Ear: External ear normal.      Left Ear: External ear normal.      Nose: Nose normal. No rhinorrhea.      Mouth/Throat:      Mouth: Mucous membranes are moist.      Comments: ETT in place  Eyes:      General: No scleral icterus.     Extraocular Movements:      Right eye: No nystagmus.      Left eye: No nystagmus.      Conjunctiva/sclera:      Right eye: Right conjunctiva is injected. Chemosis present.      Left eye: Left conjunctiva is injected. Chemosis present.      Pupils: Pupils are equal, round, and reactive to light.   Neck:      Comments: Left IJ TLC (6/10)  Cardiovascular:      Rate and Rhythm: Normal rate and regular rhythm.      Pulses: Normal pulses.      Heart sounds: No murmur heard.  Pulmonary:      Breath sounds: No wheezing.      Comments: diminished throughout  Chest:      Chest wall: No tenderness.   Abdominal:      Palpations: Abdomen is soft.      Tenderness: There is no abdominal  tenderness. There is no guarding or rebound.   Genitourinary:     Comments: Hood in place  Musculoskeletal:         General: Swelling present.      Cervical back: Normal range of motion and neck supple.      Right lower leg: Edema present.      Left lower leg: Edema present.      Comments: Diffuse swelling noted to all extremities   Lymphadenopathy:      Cervical: No cervical adenopathy.   Skin:     General: Skin is warm and dry.      Capillary Refill: Capillary refill takes less than 2 seconds.      Findings: No rash.   Neurological:      Comments: No cough/gag  ?Corneal to right eye, subtle  Pupils non reative    No posturing or withdrawal with sternal rub  Triggers on PSV   Psychiatric:      Comments: Unable to assess         Medications  Current Facility-Administered Medications   Medication Dose Route Frequency Provider Last Rate Last Admin    HEPARIN SODIUM (PORCINE) 1000 UNIT/ML INJ SOLN             furosemide (Lasix) injection 120 mg  120 mg Intravenous Q DAY Immanuel Cruz M.D.   120 mg at 06/18/24 0544    heparin injection 500 Units  500 Units Intravenous DIALYSIS PRN Fadi Najjar, M.D.   500 Units at 06/17/24 1005    heparin injection 1,500 Units  1,500 Units Intravenous DIALYSIS PRN Fadi Najjar, M.D.   1,500 Units at 06/17/24 1005    heparin intracatheter (for DIALYSIS USE ONLY) 1,200 Units  1,200 Units Intracatheter DIALYSIS PRN Fadi Najjar, M.D.   1,200 Units at 06/17/24 1305    heparin intracatheter (for DIALYSIS USE ONLY) 1,200 Units  1,200 Units Intracatheter DIALYSIS PRN Fadi Najjar, M.D.   1,200 Units at 06/17/24 1305    labetalol (Normodyne/Trandate) injection 20 mg  20 mg Intravenous Q2HRS PRN Immanuel Cruz M.D.        Respiratory Therapy Consult   Nebulization Continuous RT Viri Pina M.D.        ampicillin/sulbactam (Unasyn) 3 g in  mL IVPB  3 g Intravenous Q EVENING Geoff Perry D.O.   Stopped at 06/17/24 1930    HYDROmorphone (DILAUDID) 1 mg/mL in 50mL NS (HIGH ALERT -  Non-Standard Continuous Infusion Concentration)   Intravenous Continuous Viri Pina M.D. 4 mL/hr at 06/18/24 0704 4 mg/hr at 06/18/24 0704    acetaminophen (Tylenol) tablet 650 mg  650 mg Enteral Tube Q6HRS PRN Viri Pina M.D.   650 mg at 06/12/24 1503    artificial tears (Eye Lubricant) ophth ointment 1 Application  1 Application Both Eyes Q8HRS Viri Pina M.D.   1 Application at 06/18/24 0600    hydrALAZINE (Apresoline) injection 20 mg  20 mg Intravenous Q4HRS PRN Viri Pina M.D.        heparin injection 5,000 Units  5,000 Units Subcutaneous Q8HRS Viri Pina M.D.   5,000 Units at 06/18/24 0545    carboxymethylcellulose (Refresh Tears) 0.5 % ophthalmic drops 1 Drop  1 Drop Both Eyes Q2HRS PRN Juju Ryan M.D.   1 Drop at 06/14/24 2228    famotidine (Pepcid) tablet 20 mg  20 mg Enteral Tube Q24HRS Pedrito Gross M.D.   20 mg at 06/17/24 0506    senna-docusate (Pericolace Or Senokot S) 8.6-50 MG per tablet 2 Tablet  2 Tablet Enteral Tube BID Pedrito Gross M.D.   2 Tablet at 06/13/24 0524    And    polyethylene glycol/lytes (Miralax) Packet 1 Packet  1 Packet Enteral Tube QDAY PRN Pedrito Gross M.D.        And    magnesium hydroxide (Milk Of Magnesia) suspension 30 mL  30 mL Enteral Tube QDAY PRN Perdito Gross M.D.        And    bisacodyl (Dulcolax) suppository 10 mg  10 mg Rectal QDAY PRN Pedrito Gross M.D.        lidocaine (Xylocaine) 1 % injection 2 mL  2 mL Tracheal Tube Q30 MIN PRN Pedrito Gross M.D.        Pharmacy Consult: Enteral tube insertion - review meds/change route/product selection  1 Each Other PHARMACY TO DOSE Pedrito Gross M.D.        HYDROmorphone (Dilaudid) injection 0.5-1 mg  0.5-1 mg Intravenous Q HOUR PRN Pedrito Gross M.D.   1 mg at 06/11/24 2108    midazolam (Versed) injection 1-5 mg  1-5 mg Intravenous Q HOUR PRN Pedrito Gross M.D.        ipratropium-albuterol (DUONEB) nebulizer solution  3 mL Nebulization Q2HRS PRN (RT) Pedrito Gross,  M.D.           Fluids    Intake/Output Summary (Last 24 hours) at 6/18/2024 0830  Last data filed at 6/18/2024 0600  Gross per 24 hour   Intake 1443.55 ml   Output 3820 ml   Net -2376.45 ml       Laboratory  Recent Labs     06/16/24  2304 06/17/24  0151 06/17/24  0500 06/17/24  1755 06/18/24  0311   DWNJE15R  --  7.48  --  7.46  --    [id number]  --  39.2*  --  35.9  --    [id number]  --  339.1*  --  89.3*  --    [id number]  --  99.2*  --  95.2  --    ARTHCO3  --  29*  --  25  --    U2LYEOHNJ  --  100  --  98%  --    ARTBE  --  5*  --  2  --    ISTATAPH 7.440  --  7.505*  --  7.511*   ISTATAPCO2 47.8*  --  42.2*  --  44.0*   ISTATAPO2 132*  --  84  --  74   ISTATATCO2 34*  --  35*  --  37*   [id number] 99  --  97  --  96   ISTATARTHCO3 32.5*  --  33.3*  --  35.2*   ISTATARTBE 7*  --  9*  --  11*   ISTATTEMP 37.4 C  --  36.9 C  --  37.4 C   ISTATFIO2 40  --  30  --  30   ISTATSPEC Arterial  --  Arterial  --  Arterial   ISTATAPHTC 7.434  --  7.507*  --  7.505*   [id number] 135*  --  83  --  76     Recent Labs     06/16/24  0548   CPKTOTAL 1248*     Recent Labs     06/17/24  1755 06/17/24  1955 06/18/24  0053 06/18/24  0408 06/18/24  0602   SODIUM 137  --  136 138  --    POTASSIUM 6.9*   < > 7.4* 6.6* 6.9*   CHLORIDE 91*  --  90* 92*  --    CO2 28  --  28 28  --    *  --  115* 128*  --    CREATININE 8.64*  --  9.63* 9.84*  --    MAGNESIUM 3.6*  --  3.8*  --  3.7*   PHOSPHORUS 10.9*  --  11.1*  --  10.9*   CALCIUM 8.6  --  8.4* 9.0  --     < > = values in this interval not displayed.     Recent Labs     06/17/24  1425 06/17/24  1755 06/18/24  0053 06/18/24  0408 06/18/24  0602   ALTSGPT 424* 387* 332* 307*  --    ASTSGOT 146* 132* 109* 99*  --    ALKPHOSPHAT 364* 352* 322* 287*  --    TBILIRUBIN 0.6 0.5 0.4 0.3  --    DBILIRUBIN 0.3 0.3 0.2  --  0.2   GAMMAGT 350* 326* 291*  --  278*   PREALBUMIN 22.8  --   --   --   --    GLUCOSE 116* 114* 95 130*  --      Recent Labs     06/17/24 1755 06/18/24 0053  06/18/24  0206 06/18/24  0408 06/18/24  0602   WBC 16.3*  --  16.4*  --  15.5*   NEUTSPOLYS 87.00*  --  78.10*  --  73.60*   LYMPHOCYTES 5.50*  --  9.00*  --  12.30*   MONOCYTES 5.60  --  9.80  --  11.00   EOSINOPHILS 0.40  --  1.70  --  1.70   BASOPHILS 0.10  --  0.20  --  0.10   ASTSGOT 132* 109*  --  99*  --    ALTSGPT 387* 332*  --  307*  --    ALKPHOSPHAT 352* 322*  --  287*  --    TBILIRUBIN 0.5 0.4  --  0.3  --      Recent Labs     06/17/24  1755 06/18/24  0053 06/18/24  0206 06/18/24  0602   RBC 3.38*  --  2.57* 2.38*   HEMOGLOBIN 9.0*  --  6.9* 6.4*   HEMATOCRIT 26.8*  --  20.3* 19.1*   PLATELETCT 140*  --  166 184   PROTHROMBTM 13.6 14.5  --  14.6   APTT 32.7 35.6  --  35.0   INR 1.03 1.11  --  1.13       Imaging  X-Ray:  I have personally reviewed the images and compared with prior images.    Assessment/Plan  * Cardiac arrest (HCC)- (present on admission)  Assessment & Plan  Found unresponsive in asystole  Suspected fentanyl overdose with UDS + for amphetamines, fentanyl, cannabinoids    No acute ischemic change on EKG, CTA negative for PE, head CT unremarkable  Continue postarrest care with normothermia protocol, maintain euvolemia, lung protective ventilation, eucapnia, euglycemia  LV function improving    Hyperkalemia  Assessment & Plan  Critical  Initiated HD 6/17  Continues to have refractory hyperK overnight  Shifting therapy multiple rounds    Will dialyze this AL  Consider fludrocortisone if refractory        Non-traumatic rhabdomyolysis- (present on admission)  Assessment & Plan  Likely due to prolonged downtime     Trend CPK-->improving   S/P Bicarb infusion    Now with dense GAVIN/ATN     Shock (HCC)  Assessment & Plan  Undifferentiated shock post asystolic cardiac arrest   Initial TTE with depressed BiV function, now improved  Off vasoactives    Continue MAP goals > 65        Acute encephalopathy  Assessment & Plan  Suspicion for anoxic encephalopathy due to out of hospital cardiac arrest with  unknown downtime  Toxic encephalopathy due to drug ingestion remains in differential  Continue supportive care, normothermia protocol  Initial head CT unremarkable  Stat EEG: no seizures  MRI brain consistent with severe anoxic brain injury      Lactic acidosis  Assessment & Plan  Resolved   Secondary to cardiac arrest  S/p IVF resuscitation and bicarbonate  Trend lactic acid level    Transaminitis- (present on admission)  Assessment & Plan  Suspect ischemic hepatopathy due to cardiac arrest  Stable  Hepatitis panel was negative  No coagulopathy, synthetic function appears preserved    Acute kidney injury (HCC)  Assessment & Plan  Ishemic ATN with possible pigment nephropathy with rhabdo  Increasingly oliguric, now with renal failure as evidenced by hyperK and metabolic derangments and severe VOL  No change to UOP/creat with lasix and albumin    6/17-HD catheter placed and HD initiated    Limit nephrotoxins  Monitor UOP/creat        Acute respiratory failure with hypoxia (HCC)  Assessment & Plan  Out of hospital cardiac arrest and intubated on 6/10/2024  Cont full ventilator support  RT/O2 protocols  Ventilator bundle protocols  I am actively adjusting ventilator settings based on ABGs/vent mechanics  S/p bronchoscopy with BAL on 6/10, empiric Unasyn x 5 days, all cultures negative  S/p bronchoscopy with BAL on 6/15: negative cultures  6/16- Repeat BAL for plugging and hypoxemia, dense secretions clears LLL, restarted unasyn    Not responding to lasix      Goals of care, counseling/discussion  Assessment & Plan  6/12 - pt's mother and sister came from [place].  I gave them a detailed update of the patient's condition, prolonged downtime, concerns for severe non reversible brain injury due to hypoxia, and poor prognosis.  I explained that his exam was showing signs of brain death with lack of corneal/cough/gag reflexes and no w/d with painful stimuli.  Family demonstrated understanding and very emotional.  6/13 -  pt's neuro exam unchanged.  I broached the subject of making the patient a DNR.  I consulted Palliative to assist with care.  6/14 - family conference with palliative care-->pt now DNAR and family would like to proceed with comfort care.  Pt is a registered donor and will keep patient as comfortable as possible during their evaluation         I have performed a physical exam and reviewed and updated ROS and Plan today (6/18/2024). In review of yesterday's note (6/17/2024), there are no changes except as documented above.     Discussed patient condition and risk of morbidity and/or mortality with RN, RT, Therapies, and Pharmacy  The patient remains critically ill.  Critical care time = 40 minutes in directly providing and coordinating critical care and extensive data review.  No time overlap and excludes procedures.

## 2024-06-19 LAB
CK BB CFR SERPL ELPH: 0 % (ref 0–0)
CK MACRO1 CFR SERPL: 0 % (ref 0–0)
CK MACRO2 CFR SERPL: 0 % (ref 0–0)
CK MB CFR SERPL ELPH: 0 % (ref 0–4)
CK MM CFR SERPL ELPH: 100 % (ref 96–100)
CK SERPL-CCNC: 1008 U/L (ref 39–308)
CK SERPL-CCNC: 1124 U/L (ref 39–308)
CK SERPL-CCNC: 675 U/L (ref 39–308)
CK SERPL-CCNC: 728 U/L (ref 39–308)
CK SERPL-CCNC: 848 U/L (ref 39–308)
PATHOLOGY CONSULT NOTE: NORMAL

## 2024-06-19 PROCEDURE — 770022 HCHG ROOM/CARE - ICU (200)

## 2024-06-20 LAB
CK BB CFR SERPL ELPH: 0 % (ref 0–0)
CK MACRO1 CFR SERPL: 0 % (ref 0–0)
CK MACRO2 CFR SERPL: 0 % (ref 0–0)
CK MB CFR SERPL ELPH: 0 % (ref 0–4)
CK MM CFR SERPL ELPH: 100 % (ref 96–100)
CK SERPL-CCNC: 475 U/L (ref 39–308)
CK SERPL-CCNC: 533 U/L (ref 39–308)
CK SERPL-CCNC: 542 U/L (ref 39–308)
CK SERPL-CCNC: 621 U/L (ref 39–308)

## 2024-06-20 PROCEDURE — 770022 HCHG ROOM/CARE - ICU (200)

## 2024-06-21 PROCEDURE — 770022 HCHG ROOM/CARE - ICU (200)

## 2024-06-22 PROCEDURE — 770022 HCHG ROOM/CARE - ICU (200)

## 2024-06-23 PROCEDURE — 770022 HCHG ROOM/CARE - ICU (200)

## 2024-06-27 LAB
FUNGUS SPEC CULT: ABNORMAL
FUNGUS SPEC CULT: ABNORMAL
FUNGUS SPEC FUNGUS STN: ABNORMAL
SIGNIFICANT IND 70042: ABNORMAL
SITE SITE: ABNORMAL
SOURCE SOURCE: ABNORMAL

## 2024-07-27 LAB
MYCOBACTERIUM SPEC CULT: NORMAL
RHODAMINE-AURAMINE STN SPEC: NORMAL
SIGNIFICANT IND 70042: NORMAL
SITE SITE: NORMAL
SOURCE SOURCE: NORMAL

## (undated) DEVICE — GOWN SURGEONS X-LARGE - DISP. (30/CA)

## (undated) DEVICE — SUTURE 2-0 SILK 12 X 18" (36PK/BX)"

## (undated) DEVICE — VESSELOOP MAXI BLUE STERILE- SURG-I-LOOP (10EA/BX)

## (undated) DEVICE — GLOVE BIOGEL INDICATOR SZ 7SURGICAL PF LTX - (50/BX 4BX/CA)

## (undated) DEVICE — CLIP LG INTNL HRZN TI ESCP LGT - (20/BX)

## (undated) DEVICE — SUTURE 2-0 SILK SH 24 (36PK/BX)"

## (undated) DEVICE — BOVIE BLADE COATED - (50/PK)

## (undated) DEVICE — DRAPE SLUSH WARMER DISC (24EA/CA)

## (undated) DEVICE — CANISTER SUCTION 3000ML MECHANICAL FILTER AUTO SHUTOFF MEDI-VAC NONSTERILE LF DISP  (40EA/CA)

## (undated) DEVICE — ELECTRODE DUAL RETURN W/ CORD - (50/PK)

## (undated) DEVICE — GLOVE BIOGEL PI ORTHO SZ 8 PF LF (40PR/BX)

## (undated) DEVICE — SUTURE GENERAL

## (undated) DEVICE — CHLORAPREP 26 ML APPLICATOR - ORANGE TINT(25/CA)

## (undated) DEVICE — GLOVE SZ 7 BIOGEL PI MICRO - PF LF (50PR/BX 4BX/CA)

## (undated) DEVICE — DRAPE LARGE 3 QUARTER - (20/CA)

## (undated) DEVICE — SUTURE 0 SILK TIES (36PK/BX)

## (undated) DEVICE — CLIP MED LG INTNL HRZN TI ESCP - (20/BX)

## (undated) DEVICE — SUTURE 4-0 PROLENE SH 36 (36PK/BX)"

## (undated) DEVICE — BAG SPONGE COUNT 10.25 X 32 - BLUE (250/CA)

## (undated) DEVICE — GLOVE BIOGEL SZ 6 PF LATEX - (50EA/BX 4BX/CA)

## (undated) DEVICE — TUBE CONNECT SUCTION CLEAR 120 X 1/4" (50EA/CA)"

## (undated) DEVICE — BLADE STERNUM SAW SURGICAL 32.0 X 6.4 MM STERILE (1/EA)

## (undated) DEVICE — SET LEADWIRE 5 LEAD BEDSIDE DISPOSABLE ECG (1SET OF 5/EA)

## (undated) DEVICE — LACTATED RINGERS INJ 1000 ML - (14EA/CA 60CA/PF)

## (undated) DEVICE — SODIUM CHL IRRIGATION 0.9% 1000ML (12EA/CA)

## (undated) DEVICE — GLOVE BIOGEL SZ 8 SURGICAL PF LTX - (50PR/BX 4BX/CA)

## (undated) DEVICE — SUCTION INSTRUMENT YANKAUER BULBOUS TIP W/O VENT (50EA/CA)

## (undated) DEVICE — DRAPE IOBAN II 23 IN X 33 IN - (10/BX)

## (undated) DEVICE — SUTURE 3-0 SILK SH C/R 18 IN - (12/BX)

## (undated) DEVICE — CLIP APPLIER OPEN SMALL (6EA/BX)

## (undated) DEVICE — APPLIER OPEN MEDIUM CLIP (6EA/BX)

## (undated) DEVICE — SUTURE 2 ETHILON LR D/A - (24/BX) 30 INCH

## (undated) DEVICE — GOWN SURGICAL X-LARGE ULTRA - FILM-REINFORCED (20/CA)

## (undated) DEVICE — GLOVE BIOGEL SZ 6.5 SURGICAL PF LTX (50PR/BX 4BX/CA)

## (undated) DEVICE — SUTURE 4-0 PROLENE PS-2 18 (36PK/BX)"

## (undated) DEVICE — GOWN SURGICAL XX-LARGE - (28EA/CA) SIRUS NON REINFORCED

## (undated) DEVICE — CORDS BIPOLAR COAGULATION - 12FT STERILE DISP. (10EA/BX)

## (undated) DEVICE — SENSOR OXIMETER ADULT SPO2 RD SET (20EA/BX)

## (undated) DEVICE — GLOVE BIOGEL PI INDICATOR SZ 8.0 SURGICAL PF LF -(50/BX 4BX/CA)

## (undated) DEVICE — SUTURE 5-0 PROLENE RB-1 D/A 36 (36PK/BX)"

## (undated) DEVICE — SUTURE 3-0 SILK 12 X 18 IN - (36/BX)

## (undated) DEVICE — SPONGE PEANUT - (5/PK 50PK/CA)

## (undated) DEVICE — GLOVE BIOGEL ECLIPSE  PF LATEX SIZE 6.5 (50PR/BX)

## (undated) DEVICE — CLIP MED INTNL HRZN TI ESCP - (25/BX)

## (undated) DEVICE — DRESSING NON ADHERENT 3 X 4 - STERILE (100/BX 12BX/CA)

## (undated) DEVICE — GLOVE BIOGEL PI INDICATOR SZ 6.5 SURGICAL PF LF - (50/BX 4BX/CA)

## (undated) DEVICE — BOVIE  BLADE 6 EXTENDED - (50/PK)

## (undated) DEVICE — TUBING CLEARLINK DUO-VENT - C-FLO (48EA/CA)

## (undated) DEVICE — GLOVE BIOGEL ECLIPSE PF LATEX SIZE 9.0

## (undated) DEVICE — SET EXTENSION WITH 2 PORTS (48EA/CA) ***PART #2C8610 IS A SUBSTITUTE*****

## (undated) DEVICE — SUTURE 1 SILK 2 X 60 (36PK/BX)

## (undated) DEVICE — GLOVE SIZE 7.0 SURGEON ACCELERATOR FREE GREEN (50PR/BX 4BX/CA)

## (undated) DEVICE — TOWELS CLOTH SURGICAL - (4/PK 20PK/CA)

## (undated) DEVICE — GLOVE BIOGEL PI INDICATOR SZ 7.0 SURGICAL PF LF - (50/BX 4BX/CA)